# Patient Record
Sex: MALE | Race: WHITE | Employment: OTHER | URBAN - METROPOLITAN AREA
[De-identification: names, ages, dates, MRNs, and addresses within clinical notes are randomized per-mention and may not be internally consistent; named-entity substitution may affect disease eponyms.]

---

## 2020-09-26 ENCOUNTER — HOSPITAL ENCOUNTER (OUTPATIENT)
Age: 60
Discharge: HOME | End: 2020-09-26
Payer: MEDICARE

## 2020-09-26 LAB
ABSOLUTE EOS #: 0.37 K/UL (ref 0–0.44)
ABSOLUTE IMMATURE GRANULOCYTE: 0.03 K/UL (ref 0–0.3)
ABSOLUTE LYMPH #: 1.54 K/UL (ref 1.1–3.7)
ABSOLUTE MONO #: 0.45 K/UL (ref 0.1–1.2)
ALBUMIN SERPL-MCNC: 3.6 G/DL (ref 3.5–5.2)
ALBUMIN/GLOBULIN RATIO: 1.3 (ref 1–2.5)
ALP BLD-CCNC: 38 U/L (ref 40–129)
ALT SERPL-CCNC: 9 U/L (ref 5–41)
ANION GAP SERPL CALCULATED.3IONS-SCNC: 9 MMOL/L (ref 9–17)
AST SERPL-CCNC: 15 U/L
BASOPHILS # BLD: 2 % (ref 0–2)
BASOPHILS ABSOLUTE: 0.12 K/UL (ref 0–0.2)
BILIRUB SERPL-MCNC: 0.59 MG/DL (ref 0.3–1.2)
BNP INTERPRETATION: ABNORMAL
BUN BLDV-MCNC: 12 MG/DL (ref 8–23)
BUN/CREAT BLD: ABNORMAL (ref 9–20)
CALCIUM SERPL-MCNC: 8.9 MG/DL (ref 8.6–10.4)
CHLORIDE BLD-SCNC: 100 MMOL/L (ref 98–107)
CHOLESTEROL/HDL RATIO: 5
CHOLESTEROL: 136 MG/DL
CO2: 32 MMOL/L (ref 20–31)
CREAT SERPL-MCNC: 1.15 MG/DL (ref 0.7–1.2)
DIFFERENTIAL TYPE: ABNORMAL
EOSINOPHILS RELATIVE PERCENT: 7 % (ref 1–4)
GFR AFRICAN AMERICAN: >60 ML/MIN
GFR NON-AFRICAN AMERICAN: >60 ML/MIN
GFR SERPL CREATININE-BSD FRML MDRD: ABNORMAL ML/MIN/{1.73_M2}
GFR SERPL CREATININE-BSD FRML MDRD: ABNORMAL ML/MIN/{1.73_M2}
GLUCOSE BLD-MCNC: 94 MG/DL (ref 70–99)
HCT VFR BLD CALC: 41.1 % (ref 40.7–50.3)
HDLC SERPL-MCNC: 27 MG/DL
HEMOGLOBIN: 12.9 G/DL (ref 13–17)
IMMATURE GRANULOCYTES: 1 %
LDL CHOLESTEROL: 72 MG/DL (ref 0–130)
LYMPHOCYTES # BLD: 30 % (ref 24–43)
MCH RBC QN AUTO: 27.4 PG (ref 25.2–33.5)
MCHC RBC AUTO-ENTMCNC: 31.4 G/DL (ref 28.4–34.8)
MCV RBC AUTO: 87.3 FL (ref 82.6–102.9)
MONOCYTES # BLD: 9 % (ref 3–12)
NRBC AUTOMATED: 0 PER 100 WBC
PDW BLD-RTO: 13.2 % (ref 11.8–14.4)
PLATELET # BLD: 246 K/UL (ref 138–453)
PLATELET ESTIMATE: ABNORMAL
PMV BLD AUTO: 8.7 FL (ref 8.1–13.5)
POTASSIUM SERPL-SCNC: 3.7 MMOL/L (ref 3.7–5.3)
PRO-BNP: 376 PG/ML
PROSTATE SPECIFIC ANTIGEN: 0.18 UG/L
RBC # BLD: 4.71 M/UL (ref 4.21–5.77)
RBC # BLD: ABNORMAL 10*6/UL
SEG NEUTROPHILS: 51 % (ref 36–65)
SEGMENTED NEUTROPHILS ABSOLUTE COUNT: 2.62 K/UL (ref 1.5–8.1)
SODIUM BLD-SCNC: 141 MMOL/L (ref 135–144)
THYROXINE, FREE: 1.04 NG/DL (ref 0.93–1.7)
TOTAL CK: 74 U/L (ref 39–308)
TOTAL PROTEIN: 6.3 G/DL (ref 6.4–8.3)
TRIGL SERPL-MCNC: 186 MG/DL
TSH SERPL DL<=0.05 MIU/L-ACNC: 1.31 MIU/L (ref 0.3–5)
VLDLC SERPL CALC-MCNC: ABNORMAL MG/DL (ref 1–30)
WBC # BLD: 5.1 K/UL (ref 3.5–11.3)
WBC # BLD: ABNORMAL 10*3/UL

## 2020-09-26 PROCEDURE — 83036 HEMOGLOBIN GLYCOSYLATED A1C: CPT

## 2020-09-26 PROCEDURE — 85025 COMPLETE CBC W/AUTO DIFF WBC: CPT

## 2020-09-26 PROCEDURE — 36415 COLL VENOUS BLD VENIPUNCTURE: CPT

## 2020-09-26 PROCEDURE — 80061 LIPID PANEL: CPT

## 2020-09-26 PROCEDURE — 83880 ASSAY OF NATRIURETIC PEPTIDE: CPT

## 2020-09-26 PROCEDURE — 84443 ASSAY THYROID STIM HORMONE: CPT

## 2020-09-26 PROCEDURE — 84439 ASSAY OF FREE THYROXINE: CPT

## 2020-09-26 PROCEDURE — 82550 ASSAY OF CK (CPK): CPT

## 2020-09-26 PROCEDURE — 84153 ASSAY OF PSA TOTAL: CPT

## 2020-09-26 PROCEDURE — 80053 COMPREHEN METABOLIC PANEL: CPT

## 2020-09-29 LAB
ESTIMATED AVERAGE GLUCOSE: 117 MG/DL
HBA1C MFR BLD: 5.7 % (ref 4–6)

## 2020-10-01 ENCOUNTER — HOSPITAL ENCOUNTER (OUTPATIENT)
Age: 60
Setting detail: SPECIMEN
Discharge: HOME | End: 2020-10-01
Payer: MEDICARE

## 2020-10-01 LAB
-: ABNORMAL
AMORPHOUS: ABNORMAL
BACTERIA: ABNORMAL
BILIRUBIN URINE: NEGATIVE
CASTS UA: ABNORMAL /LPF (ref 0–8)
COLOR: YELLOW
CRYSTALS, UA: ABNORMAL /HPF
EPITHELIAL CELLS UA: ABNORMAL /HPF (ref 0–5)
GLUCOSE URINE: NEGATIVE
KETONES, URINE: NEGATIVE
LEUKOCYTE ESTERASE, URINE: NEGATIVE
MUCUS: ABNORMAL
NITRITE, URINE: NEGATIVE
OTHER OBSERVATIONS UA: ABNORMAL
PH UA: 7 (ref 5–8)
PROTEIN UA: NEGATIVE
RBC UA: ABNORMAL /HPF (ref 0–4)
RENAL EPITHELIAL, UA: ABNORMAL /HPF
SPECIFIC GRAVITY UA: 1.02 (ref 1–1.03)
TRICHOMONAS: ABNORMAL
TURBIDITY: CLEAR
URINE HGB: NEGATIVE
UROBILINOGEN, URINE: ABNORMAL
WBC UA: ABNORMAL /HPF (ref 0–5)
YEAST: ABNORMAL

## 2020-10-01 PROCEDURE — 81001 URINALYSIS AUTO W/SCOPE: CPT

## 2022-01-01 ENCOUNTER — HOSPITAL ENCOUNTER (INPATIENT)
Age: 62
LOS: 2 days | DRG: 951 | End: 2022-01-30
Attending: INTERNAL MEDICINE | Admitting: INTERNAL MEDICINE
Payer: COMMERCIAL

## 2022-01-01 ENCOUNTER — APPOINTMENT (OUTPATIENT)
Dept: GENERAL RADIOLOGY | Age: 62
DRG: 177 | End: 2022-01-01
Payer: MEDICARE

## 2022-01-01 ENCOUNTER — APPOINTMENT (OUTPATIENT)
Dept: ULTRASOUND IMAGING | Age: 62
DRG: 177 | End: 2022-01-01
Payer: MEDICARE

## 2022-01-01 ENCOUNTER — APPOINTMENT (OUTPATIENT)
Dept: CT IMAGING | Age: 62
DRG: 177 | End: 2022-01-01
Payer: MEDICARE

## 2022-01-01 ENCOUNTER — HOSPITAL ENCOUNTER (INPATIENT)
Age: 62
LOS: 20 days | Discharge: HOSPICE | DRG: 177 | End: 2022-01-28
Attending: EMERGENCY MEDICINE | Admitting: INTERNAL MEDICINE
Payer: MEDICARE

## 2022-01-01 VITALS
RESPIRATION RATE: 30 BRPM | OXYGEN SATURATION: 62 % | TEMPERATURE: 99.7 F | BODY MASS INDEX: 34.62 KG/M2 | WEIGHT: 248.2 LBS | HEART RATE: 110 BPM | DIASTOLIC BLOOD PRESSURE: 65 MMHG | SYSTOLIC BLOOD PRESSURE: 93 MMHG

## 2022-01-01 VITALS
OXYGEN SATURATION: 86 % | RESPIRATION RATE: 18 BRPM | HEIGHT: 71 IN | DIASTOLIC BLOOD PRESSURE: 64 MMHG | SYSTOLIC BLOOD PRESSURE: 115 MMHG | WEIGHT: 248.06 LBS | HEART RATE: 110 BPM | BODY MASS INDEX: 34.73 KG/M2 | TEMPERATURE: 98.8 F

## 2022-01-01 DIAGNOSIS — J96.02 ACUTE RESPIRATORY FAILURE WITH HYPERCAPNIA (HCC): ICD-10-CM

## 2022-01-01 DIAGNOSIS — U07.1 COVID-19: ICD-10-CM

## 2022-01-01 DIAGNOSIS — R41.82 ALTERED MENTAL STATUS, UNSPECIFIED ALTERED MENTAL STATUS TYPE: Primary | ICD-10-CM

## 2022-01-01 LAB
-: NORMAL
ABSOLUTE EOS #: 0 K/UL (ref 0–0.4)
ABSOLUTE EOS #: 0 K/UL (ref 0–0.44)
ABSOLUTE EOS #: 0 K/UL (ref 0–0.44)
ABSOLUTE EOS #: 0.1 K/UL (ref 0–0.44)
ABSOLUTE EOS #: 0.19 K/UL (ref 0–0.44)
ABSOLUTE EOS #: 0.2 K/UL (ref 0–0.44)
ABSOLUTE EOS #: 0.21 K/UL (ref 0–0.44)
ABSOLUTE IMMATURE GRANULOCYTE: 0 K/UL (ref 0–0.3)
ABSOLUTE IMMATURE GRANULOCYTE: 0.04 K/UL (ref 0–0.3)
ABSOLUTE IMMATURE GRANULOCYTE: 0.06 K/UL (ref 0–0.3)
ABSOLUTE IMMATURE GRANULOCYTE: 0.07 K/UL (ref 0–0.3)
ABSOLUTE IMMATURE GRANULOCYTE: 0.07 K/UL (ref 0–0.3)
ABSOLUTE IMMATURE GRANULOCYTE: 0.1 K/UL (ref 0–0.3)
ABSOLUTE IMMATURE GRANULOCYTE: 0.11 K/UL (ref 0–0.3)
ABSOLUTE LYMPH #: 0.29 K/UL (ref 1.1–3.7)
ABSOLUTE LYMPH #: 0.43 K/UL (ref 1.1–3.7)
ABSOLUTE LYMPH #: 0.59 K/UL (ref 1–4.8)
ABSOLUTE LYMPH #: 0.92 K/UL (ref 1.1–3.7)
ABSOLUTE LYMPH #: 1.03 K/UL (ref 1.1–3.7)
ABSOLUTE LYMPH #: 1.25 K/UL (ref 1.1–3.7)
ABSOLUTE LYMPH #: 1.32 K/UL (ref 1.1–3.7)
ABSOLUTE MONO #: 0.08 K/UL (ref 0.1–1.2)
ABSOLUTE MONO #: 0.5 K/UL (ref 0.1–1.2)
ABSOLUTE MONO #: 0.59 K/UL (ref 0.2–0.8)
ABSOLUTE MONO #: 0.71 K/UL (ref 0.1–1.2)
ABSOLUTE MONO #: 0.83 K/UL (ref 0.1–1.2)
ABSOLUTE MONO #: 0.86 K/UL (ref 0.1–1.2)
ABSOLUTE MONO #: 0.94 K/UL (ref 0.1–1.2)
ACETAMINOPHEN LEVEL: <10 UG/ML (ref 10–30)
ACTION: NORMAL
ALBUMIN (CALCULATED): 3.9 G/DL (ref 3.2–5.2)
ALBUMIN PERCENT: 61 % (ref 45–65)
ALBUMIN SERPL-MCNC: 3.4 G/DL (ref 3.5–5.2)
ALBUMIN SERPL-MCNC: 3.5 G/DL (ref 3.5–5.2)
ALBUMIN SERPL-MCNC: 3.8 G/DL (ref 3.5–5.2)
ALBUMIN/GLOBULIN RATIO: ABNORMAL (ref 1–2.5)
ALLEN TEST: ABNORMAL
ALLEN TEST: POSITIVE
ALP BLD-CCNC: 57 U/L (ref 40–129)
ALP BLD-CCNC: 62 U/L (ref 40–129)
ALP BLD-CCNC: 71 U/L (ref 40–129)
ALP BLD-CCNC: 76 U/L (ref 40–129)
ALP BLD-CCNC: 77 U/L (ref 40–129)
ALPHA 1 PERCENT: 4 % (ref 3–6)
ALPHA 2 PERCENT: 12 % (ref 6–13)
ALPHA-1-GLOBULIN: 0.2 G/DL (ref 0.1–0.4)
ALPHA-2-GLOBULIN: 0.7 G/DL (ref 0.5–0.9)
ALT SERPL-CCNC: 421 U/L (ref 5–41)
ALT SERPL-CCNC: 46 U/L (ref 5–41)
ALT SERPL-CCNC: 46 U/L (ref 5–41)
ALT SERPL-CCNC: 501 U/L (ref 5–41)
ALT SERPL-CCNC: 559 U/L (ref 5–41)
AMMONIA: 20 UMOL/L (ref 16–60)
AMORPHOUS: NORMAL
ANION GAP SERPL CALCULATED.3IONS-SCNC: 10 MMOL/L (ref 9–17)
ANION GAP SERPL CALCULATED.3IONS-SCNC: 12 MMOL/L (ref 9–17)
ANION GAP SERPL CALCULATED.3IONS-SCNC: 13 MMOL/L (ref 9–17)
ANION GAP SERPL CALCULATED.3IONS-SCNC: 13 MMOL/L (ref 9–17)
ANION GAP SERPL CALCULATED.3IONS-SCNC: 14 MMOL/L (ref 9–17)
ANION GAP SERPL CALCULATED.3IONS-SCNC: 15 MMOL/L (ref 9–17)
ANTI DNA DOUBLE STRANDED: 25 IU/ML
ANTI JO-1 IGG: 1.1 U/ML
ANTI SSA: 1.1 U/ML
ANTI SSB: 1.3 U/ML
ANTI-CENTROMERE: 0.9 U/ML
ANTI-NUCLEAR ANTIBODY (ANA): POSITIVE
ANTI-RNP70: 0.9 U/ML
ANTI-SCLERODERMA: <0.6 U/ML
ANTI-SMITH: 4.5 U/ML
ANTI-U1RNP: 4.1 U/ML
AST SERPL-CCNC: 134 U/L
AST SERPL-CCNC: 230 U/L
AST SERPL-CCNC: 399 U/L
AST SERPL-CCNC: 60 U/L
AST SERPL-CCNC: 82 U/L
BACTERIA: NORMAL
BASOPHILS # BLD: 0 %
BASOPHILS # BLD: 0 % (ref 0–2)
BASOPHILS # BLD: 1 % (ref 0–2)
BASOPHILS ABSOLUTE: 0 K/UL (ref 0–0.2)
BASOPHILS ABSOLUTE: 0.03 K/UL (ref 0–0.2)
BASOPHILS ABSOLUTE: 0.04 K/UL (ref 0–0.2)
BASOPHILS ABSOLUTE: 0.06 K/UL (ref 0–0.2)
BASOPHILS ABSOLUTE: 0.09 K/UL (ref 0–0.2)
BETA GLOBULIN: 0.8 G/DL (ref 0.5–1.1)
BETA PERCENT: 12 % (ref 11–19)
BILIRUB SERPL-MCNC: 0.36 MG/DL (ref 0.3–1.2)
BILIRUB SERPL-MCNC: 0.45 MG/DL (ref 0.3–1.2)
BILIRUB SERPL-MCNC: 1.07 MG/DL (ref 0.3–1.2)
BILIRUB SERPL-MCNC: 1.43 MG/DL (ref 0.3–1.2)
BILIRUB SERPL-MCNC: 1.54 MG/DL (ref 0.3–1.2)
BILIRUBIN DIRECT: 0.21 MG/DL
BILIRUBIN DIRECT: 0.51 MG/DL
BILIRUBIN DIRECT: 0.92 MG/DL
BILIRUBIN URINE: NEGATIVE
BILIRUBIN, INDIRECT: 0.24 MG/DL (ref 0–1)
BILIRUBIN, INDIRECT: 0.56 MG/DL (ref 0–1)
BILIRUBIN, INDIRECT: 0.62 MG/DL (ref 0–1)
BNP INTERPRETATION: ABNORMAL
BNP INTERPRETATION: ABNORMAL
BUN BLDV-MCNC: 23 MG/DL (ref 8–23)
BUN BLDV-MCNC: 24 MG/DL (ref 8–23)
BUN BLDV-MCNC: 26 MG/DL (ref 8–23)
BUN BLDV-MCNC: 27 MG/DL (ref 8–23)
BUN BLDV-MCNC: 28 MG/DL (ref 8–23)
BUN BLDV-MCNC: 31 MG/DL (ref 8–23)
BUN BLDV-MCNC: 32 MG/DL (ref 8–23)
BUN BLDV-MCNC: 35 MG/DL (ref 8–23)
BUN BLDV-MCNC: 35 MG/DL (ref 8–23)
BUN BLDV-MCNC: 36 MG/DL (ref 8–23)
BUN BLDV-MCNC: 38 MG/DL (ref 8–23)
BUN BLDV-MCNC: 38 MG/DL (ref 8–23)
BUN BLDV-MCNC: 39 MG/DL (ref 8–23)
BUN BLDV-MCNC: 40 MG/DL (ref 8–23)
BUN BLDV-MCNC: 40 MG/DL (ref 8–23)
BUN BLDV-MCNC: 42 MG/DL (ref 8–23)
BUN/CREAT BLD: 10 (ref 9–20)
BUN/CREAT BLD: 20 (ref 9–20)
BUN/CREAT BLD: 23 (ref 9–20)
BUN/CREAT BLD: 24 (ref 9–20)
BUN/CREAT BLD: 24 (ref 9–20)
BUN/CREAT BLD: 25 (ref 9–20)
BUN/CREAT BLD: 25 (ref 9–20)
BUN/CREAT BLD: 26 (ref 9–20)
BUN/CREAT BLD: 28 (ref 9–20)
BUN/CREAT BLD: 29 (ref 9–20)
BUN/CREAT BLD: 33 (ref 9–20)
BUN/CREAT BLD: 34 (ref 9–20)
BUN/CREAT BLD: 39 (ref 9–20)
BUN/CREAT BLD: 7 (ref 9–20)
C DIFF AG + TOXIN: NEGATIVE
C-REACTIVE PROTEIN: 100.7 MG/L (ref 0–5)
C-REACTIVE PROTEIN: 216.4 MG/L (ref 0–5)
C-REACTIVE PROTEIN: 9.3 MG/L (ref 0–5)
CALCIUM SERPL-MCNC: 8.3 MG/DL (ref 8.6–10.4)
CALCIUM SERPL-MCNC: 8.4 MG/DL (ref 8.6–10.4)
CALCIUM SERPL-MCNC: 8.5 MG/DL (ref 8.6–10.4)
CALCIUM SERPL-MCNC: 8.6 MG/DL (ref 8.6–10.4)
CALCIUM SERPL-MCNC: 8.6 MG/DL (ref 8.6–10.4)
CALCIUM SERPL-MCNC: 8.7 MG/DL (ref 8.6–10.4)
CALCIUM SERPL-MCNC: 8.8 MG/DL (ref 8.6–10.4)
CALCIUM SERPL-MCNC: 8.8 MG/DL (ref 8.6–10.4)
CALCIUM SERPL-MCNC: 8.9 MG/DL (ref 8.6–10.4)
CALCIUM SERPL-MCNC: 9 MG/DL (ref 8.6–10.4)
CALCIUM SERPL-MCNC: 9.3 MG/DL (ref 8.6–10.4)
CALCIUM SERPL-MCNC: 9.4 MG/DL (ref 8.6–10.4)
CAMPYLOBACTER PCR: NORMAL
CASTS UA: NORMAL /LPF
CHLORIDE BLD-SCNC: 100 MMOL/L (ref 98–107)
CHLORIDE BLD-SCNC: 102 MMOL/L (ref 98–107)
CHLORIDE BLD-SCNC: 102 MMOL/L (ref 98–107)
CHLORIDE BLD-SCNC: 103 MMOL/L (ref 98–107)
CHLORIDE BLD-SCNC: 89 MMOL/L (ref 98–107)
CHLORIDE BLD-SCNC: 93 MMOL/L (ref 98–107)
CHLORIDE BLD-SCNC: 93 MMOL/L (ref 98–107)
CHLORIDE BLD-SCNC: 94 MMOL/L (ref 98–107)
CHLORIDE BLD-SCNC: 94 MMOL/L (ref 98–107)
CHLORIDE BLD-SCNC: 96 MMOL/L (ref 98–107)
CHLORIDE BLD-SCNC: 97 MMOL/L (ref 98–107)
CHLORIDE BLD-SCNC: 97 MMOL/L (ref 98–107)
CHLORIDE BLD-SCNC: 98 MMOL/L (ref 98–107)
CHLORIDE BLD-SCNC: 98 MMOL/L (ref 98–107)
CHLORIDE BLD-SCNC: 99 MMOL/L (ref 98–107)
CO2: 22 MMOL/L (ref 20–31)
CO2: 23 MMOL/L (ref 20–31)
CO2: 24 MMOL/L (ref 20–31)
CO2: 25 MMOL/L (ref 20–31)
CO2: 25 MMOL/L (ref 20–31)
CO2: 26 MMOL/L (ref 20–31)
CO2: 27 MMOL/L (ref 20–31)
CO2: 27 MMOL/L (ref 20–31)
CO2: 28 MMOL/L (ref 20–31)
CO2: 28 MMOL/L (ref 20–31)
CO2: 29 MMOL/L (ref 20–31)
CO2: 30 MMOL/L (ref 20–31)
CO2: 34 MMOL/L (ref 20–31)
CO2: 37 MMOL/L (ref 20–31)
COLOR: YELLOW
COMMENT UA: ABNORMAL
COMPLEMENT C3: 139 MG/DL (ref 90–180)
COMPLEMENT C4: 34 MG/DL (ref 10–40)
CREAT SERPL-MCNC: 0.93 MG/DL (ref 0.7–1.2)
CREAT SERPL-MCNC: 0.93 MG/DL (ref 0.7–1.2)
CREAT SERPL-MCNC: 0.98 MG/DL (ref 0.7–1.2)
CREAT SERPL-MCNC: 0.99 MG/DL (ref 0.7–1.2)
CREAT SERPL-MCNC: 1.12 MG/DL (ref 0.7–1.2)
CREAT SERPL-MCNC: 1.13 MG/DL (ref 0.7–1.2)
CREAT SERPL-MCNC: 1.14 MG/DL (ref 0.7–1.2)
CREAT SERPL-MCNC: 1.19 MG/DL (ref 0.7–1.2)
CREAT SERPL-MCNC: 1.2 MG/DL (ref 0.7–1.2)
CREAT SERPL-MCNC: 1.23 MG/DL (ref 0.7–1.2)
CREAT SERPL-MCNC: 1.24 MG/DL (ref 0.7–1.2)
CREAT SERPL-MCNC: 1.25 MG/DL (ref 0.7–1.2)
CREAT SERPL-MCNC: 1.33 MG/DL (ref 0.7–1.2)
CREAT SERPL-MCNC: 1.45 MG/DL (ref 0.7–1.2)
CREAT SERPL-MCNC: 1.49 MG/DL (ref 0.7–1.2)
CREAT SERPL-MCNC: 1.66 MG/DL (ref 0.7–1.2)
CREAT SERPL-MCNC: 2.31 MG/DL (ref 0.7–1.2)
CREAT SERPL-MCNC: 3.09 MG/DL (ref 0.7–1.2)
CRYSTALS, UA: NORMAL /HPF
CULTURE: NORMAL
D-DIMER QUANTITATIVE: 0.69 MG/L FEU (ref 0–0.59)
D-DIMER QUANTITATIVE: 1.02 MG/L FEU (ref 0–0.59)
D-DIMER QUANTITATIVE: 1.59 MG/L FEU (ref 0–0.59)
D-DIMER QUANTITATIVE: 2.31 MG/L FEU (ref 0–0.59)
D-DIMER QUANTITATIVE: 2.32 MG/L FEU (ref 0–0.59)
DATE AND TIME: NORMAL
DIFFERENTIAL TYPE: ABNORMAL
E COLI ENTEROTOXIGENIC PCR: NORMAL
EKG ATRIAL RATE: 85 BPM
EKG ATRIAL RATE: 86 BPM
EKG ATRIAL RATE: 91 BPM
EKG ATRIAL RATE: 94 BPM
EKG P AXIS: 27 DEGREES
EKG P AXIS: 42 DEGREES
EKG P AXIS: 57 DEGREES
EKG P-R INTERVAL: 116 MS
EKG P-R INTERVAL: 118 MS
EKG P-R INTERVAL: 142 MS
EKG P-R INTERVAL: 150 MS
EKG Q-T INTERVAL: 374 MS
EKG Q-T INTERVAL: 380 MS
EKG Q-T INTERVAL: 380 MS
EKG Q-T INTERVAL: 384 MS
EKG QRS DURATION: 100 MS
EKG QRS DURATION: 100 MS
EKG QRS DURATION: 92 MS
EKG QRS DURATION: 96 MS
EKG QTC CALCULATION (BAZETT): 454 MS
EKG QTC CALCULATION (BAZETT): 456 MS
EKG QTC CALCULATION (BAZETT): 460 MS
EKG QTC CALCULATION (BAZETT): 475 MS
EKG R AXIS: -11 DEGREES
EKG R AXIS: -5 DEGREES
EKG R AXIS: 11 DEGREES
EKG R AXIS: 43 DEGREES
EKG T AXIS: 18 DEGREES
EKG T AXIS: 20 DEGREES
EKG T AXIS: 27 DEGREES
EKG T AXIS: 38 DEGREES
EKG VENTRICULAR RATE: 85 BPM
EKG VENTRICULAR RATE: 86 BPM
EKG VENTRICULAR RATE: 91 BPM
EKG VENTRICULAR RATE: 94 BPM
ENA ANTIBODIES SCREEN: 0.6 U/ML
EOSINOPHILS RELATIVE PERCENT: 0 % (ref 1–4)
EOSINOPHILS RELATIVE PERCENT: 1 % (ref 1–4)
EOSINOPHILS RELATIVE PERCENT: 2 % (ref 1–4)
EPITHELIAL CELLS UA: NORMAL /HPF (ref 0–5)
ETHANOL PERCENT: <0.01 %
ETHANOL: <10 MG/DL
FERRITIN: 884 UG/L (ref 30–400)
FIBRINOGEN: 481 MG/DL (ref 179–518)
FIO2: 100
FIO2: 2
FIO2: ABNORMAL
FIO2: ABNORMAL
FREE KAPPA/LAMBDA RATIO: 1.67 (ref 0.26–1.65)
GAMMA GLOBULIN %: 13 % (ref 9–20)
GAMMA GLOBULIN: 0.8 G/DL (ref 0.5–1.5)
GFR AFRICAN AMERICAN: 25 ML/MIN
GFR AFRICAN AMERICAN: 35 ML/MIN
GFR AFRICAN AMERICAN: 51 ML/MIN
GFR AFRICAN AMERICAN: 58 ML/MIN
GFR AFRICAN AMERICAN: 60 ML/MIN
GFR AFRICAN AMERICAN: >60 ML/MIN
GFR NON-AFRICAN AMERICAN: 21 ML/MIN
GFR NON-AFRICAN AMERICAN: 29 ML/MIN
GFR NON-AFRICAN AMERICAN: 42 ML/MIN
GFR NON-AFRICAN AMERICAN: 48 ML/MIN
GFR NON-AFRICAN AMERICAN: 49 ML/MIN
GFR NON-AFRICAN AMERICAN: 54 ML/MIN
GFR NON-AFRICAN AMERICAN: 58 ML/MIN
GFR NON-AFRICAN AMERICAN: 59 ML/MIN
GFR NON-AFRICAN AMERICAN: 59 ML/MIN
GFR NON-AFRICAN AMERICAN: >60 ML/MIN
GFR SERPL CREATININE-BSD FRML MDRD: ABNORMAL ML/MIN/{1.73_M2}
GLOBULIN: ABNORMAL G/DL (ref 1.5–3.8)
GLUCOSE BLD-MCNC: 100 MG/DL (ref 70–99)
GLUCOSE BLD-MCNC: 105 MG/DL (ref 70–99)
GLUCOSE BLD-MCNC: 112 MG/DL (ref 70–99)
GLUCOSE BLD-MCNC: 115 MG/DL (ref 70–99)
GLUCOSE BLD-MCNC: 117 MG/DL (ref 70–99)
GLUCOSE BLD-MCNC: 124 MG/DL (ref 70–99)
GLUCOSE BLD-MCNC: 125 MG/DL (ref 70–99)
GLUCOSE BLD-MCNC: 133 MG/DL (ref 70–99)
GLUCOSE BLD-MCNC: 137 MG/DL (ref 70–99)
GLUCOSE BLD-MCNC: 137 MG/DL (ref 70–99)
GLUCOSE BLD-MCNC: 140 MG/DL (ref 70–99)
GLUCOSE BLD-MCNC: 146 MG/DL (ref 70–99)
GLUCOSE BLD-MCNC: 149 MG/DL (ref 70–99)
GLUCOSE BLD-MCNC: 152 MG/DL (ref 70–99)
GLUCOSE BLD-MCNC: 161 MG/DL (ref 70–99)
GLUCOSE BLD-MCNC: 92 MG/DL (ref 70–99)
GLUCOSE URINE: NEGATIVE
HAV IGM SER IA-ACNC: NONREACTIVE
HCO3 VENOUS: 27.1 MMOL/L (ref 22–29)
HCT VFR BLD CALC: 37.6 % (ref 40.7–50.3)
HCT VFR BLD CALC: 37.6 % (ref 40.7–50.3)
HCT VFR BLD CALC: 40.4 % (ref 40.7–50.3)
HCT VFR BLD CALC: 40.8 % (ref 40.7–50.3)
HCT VFR BLD CALC: 41.1 % (ref 40.7–50.3)
HCT VFR BLD CALC: 41.3 % (ref 40.7–50.3)
HCT VFR BLD CALC: 41.5 % (ref 40.7–50.3)
HCT VFR BLD CALC: 41.7 % (ref 40.7–50.3)
HCT VFR BLD CALC: 41.7 % (ref 40.7–50.3)
HCT VFR BLD CALC: 45.3 % (ref 40.7–50.3)
HCT VFR BLD CALC: 46.7 % (ref 40.7–50.3)
HEMOGLOBIN: 11.5 G/DL (ref 13–17)
HEMOGLOBIN: 11.9 G/DL (ref 13–17)
HEMOGLOBIN: 12.3 G/DL (ref 13–17)
HEMOGLOBIN: 12.6 G/DL (ref 13–17)
HEMOGLOBIN: 12.7 G/DL (ref 13–17)
HEMOGLOBIN: 12.9 G/DL (ref 13–17)
HEMOGLOBIN: 13.1 G/DL (ref 13–17)
HEMOGLOBIN: 13.2 G/DL (ref 13–17)
HEMOGLOBIN: 13.4 G/DL (ref 13–17)
HEMOGLOBIN: 13.8 G/DL (ref 13–17)
HEMOGLOBIN: 15.1 G/DL (ref 13–17)
HEPATITIS B CORE IGM ANTIBODY: NONREACTIVE
HEPATITIS B SURFACE ANTIGEN: NONREACTIVE
HEPATITIS C ANTIBODY: NONREACTIVE
IMMATURE GRANULOCYTES: 0 %
IMMATURE GRANULOCYTES: 1 %
INR BLD: 1.2
INR BLD: 1.3
KAPPA FREE LIGHT CHAINS QNT: 5.61 MG/DL (ref 0.37–1.94)
KETONES, URINE: NEGATIVE
LACTATE DEHYDROGENASE: 366 U/L (ref 135–225)
LACTIC ACID, SEPSIS WHOLE BLOOD: NORMAL MMOL/L (ref 0.5–1.9)
LACTIC ACID, SEPSIS WHOLE BLOOD: NORMAL MMOL/L (ref 0.5–1.9)
LACTIC ACID, SEPSIS: 1.5 MMOL/L (ref 0.5–1.9)
LACTIC ACID, SEPSIS: 1.6 MMOL/L (ref 0.5–1.9)
LACTIC ACID, WHOLE BLOOD: NORMAL MMOL/L (ref 0.7–2.1)
LACTIC ACID: 1 MMOL/L (ref 0.5–2.2)
LACTIC ACID: 1.4 MMOL/L (ref 0.5–2.2)
LACTIC ACID: 1.7 MMOL/L (ref 0.5–2.2)
LAMBDA FREE LIGHT CHAINS QNT: 3.36 MG/DL (ref 0.57–2.63)
LEUKOCYTE ESTERASE, URINE: NEGATIVE
LV EF: 55 %
LVEF MODALITY: NORMAL
LYMPHOCYTES # BLD: 10 % (ref 24–43)
LYMPHOCYTES # BLD: 11 % (ref 24–43)
LYMPHOCYTES # BLD: 12 % (ref 24–43)
LYMPHOCYTES # BLD: 12 % (ref 24–44)
LYMPHOCYTES # BLD: 13 % (ref 24–43)
LYMPHOCYTES # BLD: 7 % (ref 24–43)
LYMPHOCYTES # BLD: 7 % (ref 24–43)
Lab: NORMAL
MAGNESIUM: 1.9 MG/DL (ref 1.6–2.6)
MAGNESIUM: 2 MG/DL (ref 1.6–2.6)
MAGNESIUM: 2 MG/DL (ref 1.6–2.6)
MAGNESIUM: 2.1 MG/DL (ref 1.6–2.6)
MAGNESIUM: 2.2 MG/DL (ref 1.6–2.6)
MAGNESIUM: 2.2 MG/DL (ref 1.6–2.6)
MAGNESIUM: 2.4 MG/DL (ref 1.6–2.6)
MAGNESIUM: 2.4 MG/DL (ref 1.6–2.6)
MCH RBC QN AUTO: 26.2 PG (ref 25.2–33.5)
MCH RBC QN AUTO: 26.6 PG (ref 25.2–33.5)
MCH RBC QN AUTO: 26.7 PG (ref 25.2–33.5)
MCH RBC QN AUTO: 26.8 PG (ref 25.2–33.5)
MCH RBC QN AUTO: 26.9 PG (ref 25.2–33.5)
MCH RBC QN AUTO: 27 PG (ref 25.2–33.5)
MCH RBC QN AUTO: 27 PG (ref 25.2–33.5)
MCH RBC QN AUTO: 27.1 PG (ref 25.2–33.5)
MCH RBC QN AUTO: 27.1 PG (ref 25.2–33.5)
MCHC RBC AUTO-ENTMCNC: 29.5 G/DL (ref 28.4–34.8)
MCHC RBC AUTO-ENTMCNC: 30.5 G/DL (ref 28.4–34.8)
MCHC RBC AUTO-ENTMCNC: 30.5 G/DL (ref 28.4–34.8)
MCHC RBC AUTO-ENTMCNC: 30.6 G/DL (ref 28.4–34.8)
MCHC RBC AUTO-ENTMCNC: 31.1 G/DL (ref 28.4–34.8)
MCHC RBC AUTO-ENTMCNC: 31.2 G/DL (ref 28.4–34.8)
MCHC RBC AUTO-ENTMCNC: 31.6 G/DL (ref 28.4–34.8)
MCHC RBC AUTO-ENTMCNC: 31.9 G/DL (ref 28.4–34.8)
MCHC RBC AUTO-ENTMCNC: 32.3 G/DL (ref 28.4–34.8)
MCHC RBC AUTO-ENTMCNC: 32.4 G/DL (ref 28.4–34.8)
MCHC RBC AUTO-ENTMCNC: 32.4 G/DL (ref 28.4–34.8)
MCV RBC AUTO: 81.1 FL (ref 82.6–102.9)
MCV RBC AUTO: 82.3 FL (ref 82.6–102.9)
MCV RBC AUTO: 83.3 FL (ref 82.6–102.9)
MCV RBC AUTO: 83.7 FL (ref 82.6–102.9)
MCV RBC AUTO: 84.7 FL (ref 82.6–102.9)
MCV RBC AUTO: 86.1 FL (ref 82.6–102.9)
MCV RBC AUTO: 87.2 FL (ref 82.6–102.9)
MCV RBC AUTO: 87.8 FL (ref 82.6–102.9)
MCV RBC AUTO: 88.6 FL (ref 82.6–102.9)
MCV RBC AUTO: 88.7 FL (ref 82.6–102.9)
MCV RBC AUTO: 90.7 FL (ref 82.6–102.9)
MODE: ABNORMAL
MONOCYTES # BLD: 10 % (ref 3–12)
MONOCYTES # BLD: 12 % (ref 1–7)
MONOCYTES # BLD: 2 % (ref 3–12)
MONOCYTES # BLD: 7 % (ref 3–12)
MONOCYTES # BLD: 8 % (ref 3–12)
MONOCYTES # BLD: 8 % (ref 3–12)
MONOCYTES # BLD: 9 % (ref 3–12)
MUCUS: NORMAL
MYOGLOBIN: 1115 NG/ML (ref 28–72)
MYOGLOBIN: 940 NG/ML (ref 28–72)
NEGATIVE BASE EXCESS, ART: ABNORMAL (ref 0–2)
NEGATIVE BASE EXCESS, VEN: 2 (ref 0–2)
NITRITE, URINE: NEGATIVE
NOTIFY: NORMAL
NRBC AUTOMATED: 0 PER 100 WBC
O2 DEVICE/FLOW/%: ABNORMAL
O2 SAT, VEN: 44 % (ref 60–85)
OTHER OBSERVATIONS UA: NORMAL
PARTIAL THROMBOPLASTIN TIME: 36.6 SEC (ref 23.9–33.8)
PARTIAL THROMBOPLASTIN TIME: 38.1 SEC (ref 23.9–33.8)
PATHOLOGIST: NORMAL
PATHOLOGIST: NORMAL
PATIENT TEMP: ABNORMAL
PCO2, VEN: 61.9 MM HG (ref 41–51)
PDW BLD-RTO: 13.9 % (ref 11.8–14.4)
PDW BLD-RTO: 14.1 % (ref 11.8–14.4)
PDW BLD-RTO: 14.1 % (ref 11.8–14.4)
PDW BLD-RTO: 14.2 % (ref 11.8–14.4)
PDW BLD-RTO: 14.3 % (ref 11.8–14.4)
PDW BLD-RTO: 14.7 % (ref 11.8–14.4)
PDW BLD-RTO: 15.2 % (ref 11.8–14.4)
PH UA: 5.5 (ref 5–8)
PH VENOUS: 7.25 (ref 7.32–7.43)
PLATELET # BLD: 143 K/UL (ref 138–453)
PLATELET # BLD: 157 K/UL (ref 138–453)
PLATELET # BLD: 176 K/UL (ref 138–453)
PLATELET # BLD: 259 K/UL (ref 138–453)
PLATELET # BLD: 279 K/UL (ref 138–453)
PLATELET # BLD: 285 K/UL (ref 138–453)
PLATELET # BLD: 300 K/UL (ref 138–453)
PLATELET # BLD: 309 K/UL (ref 138–453)
PLATELET # BLD: 316 K/UL (ref 138–453)
PLATELET # BLD: 326 K/UL (ref 138–453)
PLATELET # BLD: 356 K/UL (ref 138–453)
PLATELET ESTIMATE: ABNORMAL
PLESIOMONAS SHIGELLOIDES PCR: NORMAL
PMV BLD AUTO: 10.1 FL (ref 8.1–13.5)
PMV BLD AUTO: 10.3 FL (ref 8.1–13.5)
PMV BLD AUTO: 8.8 FL (ref 8.1–13.5)
PMV BLD AUTO: 8.9 FL (ref 8.1–13.5)
PMV BLD AUTO: 9.3 FL (ref 8.1–13.5)
PMV BLD AUTO: 9.3 FL (ref 8.1–13.5)
PMV BLD AUTO: 9.5 FL (ref 8.1–13.5)
PMV BLD AUTO: 9.6 FL (ref 8.1–13.5)
PMV BLD AUTO: 9.8 FL (ref 8.1–13.5)
PMV BLD AUTO: 9.8 FL (ref 8.1–13.5)
PMV BLD AUTO: 9.9 FL (ref 8.1–13.5)
PO2, VEN: 29.1 MM HG (ref 30–50)
POC HCO3: 24.8 MMOL/L (ref 21–28)
POC HCO3: 26.1 MMOL/L (ref 21–28)
POC HCO3: 29.2 MMOL/L (ref 21–28)
POC O2 SATURATION: 84 % (ref 94–98)
POC O2 SATURATION: 87 % (ref 94–98)
POC O2 SATURATION: 93 % (ref 94–98)
POC PCO2 TEMP: ABNORMAL MM HG
POC PCO2: 40.9 MM HG (ref 35–48)
POC PCO2: 42.2 MM HG (ref 35–48)
POC PCO2: 48.5 MM HG (ref 35–48)
POC PH TEMP: ABNORMAL
POC PH: 7.38 (ref 7.35–7.45)
POC PH: 7.39 (ref 7.35–7.45)
POC PH: 7.41 (ref 7.35–7.45)
POC PO2 TEMP: ABNORMAL MM HG
POC PO2: 50 MM HG (ref 83–108)
POC PO2: 52.8 MM HG (ref 83–108)
POC PO2: 69.7 MM HG (ref 83–108)
POSITIVE BASE EXCESS, ART: 0 (ref 0–3)
POSITIVE BASE EXCESS, ART: 1 (ref 0–3)
POSITIVE BASE EXCESS, ART: 3 (ref 0–3)
POSITIVE BASE EXCESS, VEN: ABNORMAL (ref 0–3)
POTASSIUM SERPL-SCNC: 3 MMOL/L (ref 3.7–5.3)
POTASSIUM SERPL-SCNC: 3.1 MMOL/L (ref 3.7–5.3)
POTASSIUM SERPL-SCNC: 3.3 MMOL/L (ref 3.7–5.3)
POTASSIUM SERPL-SCNC: 3.4 MMOL/L (ref 3.7–5.3)
POTASSIUM SERPL-SCNC: 3.5 MMOL/L (ref 3.7–5.3)
POTASSIUM SERPL-SCNC: 3.5 MMOL/L (ref 3.7–5.3)
POTASSIUM SERPL-SCNC: 3.7 MMOL/L (ref 3.7–5.3)
POTASSIUM SERPL-SCNC: 3.9 MMOL/L (ref 3.7–5.3)
POTASSIUM SERPL-SCNC: 4 MMOL/L (ref 3.7–5.3)
POTASSIUM SERPL-SCNC: 4 MMOL/L (ref 3.7–5.3)
POTASSIUM SERPL-SCNC: 4.1 MMOL/L (ref 3.7–5.3)
POTASSIUM SERPL-SCNC: 4.3 MMOL/L (ref 3.7–5.3)
POTASSIUM SERPL-SCNC: 4.5 MMOL/L (ref 3.7–5.3)
POTASSIUM SERPL-SCNC: 4.6 MMOL/L (ref 3.7–5.3)
PRO-BNP: 694 PG/ML
PRO-BNP: 894 PG/ML
PROCALCITONIN: 0.18 NG/ML
PROCALCITONIN: 0.4 NG/ML
PROCALCITONIN: 0.41 NG/ML
PROTEIN ELECTROPHORESIS, SERUM: NORMAL
PROTEIN UA: ABNORMAL
PROTHROMBIN TIME: 15.5 SEC (ref 11.5–14.2)
PROTHROMBIN TIME: 16.1 SEC (ref 11.5–14.2)
RBC # BLD: 4.24 M/UL (ref 4.21–5.77)
RBC # BLD: 4.44 M/UL (ref 4.21–5.77)
RBC # BLD: 4.6 M/UL (ref 4.21–5.77)
RBC # BLD: 4.69 M/UL (ref 4.21–5.77)
RBC # BLD: 4.75 M/UL (ref 4.21–5.77)
RBC # BLD: 4.76 M/UL (ref 4.21–5.77)
RBC # BLD: 4.91 M/UL (ref 4.21–5.77)
RBC # BLD: 4.96 M/UL (ref 4.21–5.77)
RBC # BLD: 4.96 M/UL (ref 4.21–5.77)
RBC # BLD: 5.11 M/UL (ref 4.21–5.77)
RBC # BLD: 5.76 M/UL (ref 4.21–5.77)
RBC # BLD: ABNORMAL 10*6/UL
RBC UA: NORMAL /HPF (ref 0–2)
READ BACK: YES
REASON FOR REJECTION: NORMAL
REASON FOR REJECTION: NORMAL
RENAL EPITHELIAL, UA: NORMAL /HPF
SALICYLATE LEVEL: <1 MG/DL (ref 3–10)
SALMONELLA PCR: NORMAL
SAMPLE SITE: ABNORMAL
SARS-COV-2, RAPID: DETECTED
SEG NEUTROPHILS: 73 % (ref 36–65)
SEG NEUTROPHILS: 76 % (ref 36–65)
SEG NEUTROPHILS: 76 % (ref 36–66)
SEG NEUTROPHILS: 78 % (ref 36–65)
SEG NEUTROPHILS: 79 % (ref 36–65)
SEG NEUTROPHILS: 83 % (ref 36–65)
SEG NEUTROPHILS: 89 % (ref 36–65)
SEGMENTED NEUTROPHILS ABSOLUTE COUNT: 3.72 K/UL (ref 1.8–7.7)
SEGMENTED NEUTROPHILS ABSOLUTE COUNT: 3.75 K/UL (ref 1.5–8.1)
SEGMENTED NEUTROPHILS ABSOLUTE COUNT: 5.15 K/UL (ref 1.5–8.1)
SEGMENTED NEUTROPHILS ABSOLUTE COUNT: 6.57 K/UL (ref 1.5–8.1)
SEGMENTED NEUTROPHILS ABSOLUTE COUNT: 7.17 K/UL (ref 1.5–8.1)
SEGMENTED NEUTROPHILS ABSOLUTE COUNT: 7.4 K/UL (ref 1.5–8.1)
SEGMENTED NEUTROPHILS ABSOLUTE COUNT: 9.43 K/UL (ref 1.5–8.1)
SERUM IFX INTERP: NORMAL
SHIGATOXIN GENE PCR: NORMAL
SHIGELLA SP PCR: NORMAL
SODIUM BLD-SCNC: 129 MMOL/L (ref 135–144)
SODIUM BLD-SCNC: 130 MMOL/L (ref 135–144)
SODIUM BLD-SCNC: 134 MMOL/L (ref 135–144)
SODIUM BLD-SCNC: 135 MMOL/L (ref 135–144)
SODIUM BLD-SCNC: 137 MMOL/L (ref 135–144)
SODIUM BLD-SCNC: 139 MMOL/L (ref 135–144)
SODIUM BLD-SCNC: 139 MMOL/L (ref 135–144)
SODIUM BLD-SCNC: 141 MMOL/L (ref 135–144)
SODIUM BLD-SCNC: 141 MMOL/L (ref 135–144)
SODIUM BLD-SCNC: 142 MMOL/L (ref 135–144)
SODIUM BLD-SCNC: 143 MMOL/L (ref 135–144)
SODIUM BLD-SCNC: 143 MMOL/L (ref 135–144)
SPECIFIC GRAVITY UA: 1.02 (ref 1–1.03)
SPECIMEN DESCRIPTION: ABNORMAL
SPECIMEN DESCRIPTION: NORMAL
TCO2 (CALC), ART: ABNORMAL MMOL/L (ref 22–29)
TOTAL CO2, VENOUS: ABNORMAL MMOL/L (ref 23–30)
TOTAL PROT. SUM,%: 102 % (ref 98–102)
TOTAL PROT. SUM: 6.4 G/DL (ref 6.3–8.2)
TOTAL PROTEIN: 6.4 G/DL (ref 6.4–8.3)
TOTAL PROTEIN: 6.5 G/DL (ref 6.4–8.3)
TOTAL PROTEIN: 6.6 G/DL (ref 6.4–8.3)
TOTAL PROTEIN: 6.8 G/DL (ref 6.4–8.3)
TOTAL PROTEIN: 6.9 G/DL (ref 6.4–8.3)
TOTAL PROTEIN: 7.1 G/DL (ref 6.4–8.3)
TOXIC TRICYCLIC SC,BLOOD: NEGATIVE
TRICHOMONAS: NORMAL
TROPONIN INTERP: ABNORMAL
TROPONIN T: ABNORMAL NG/ML
TROPONIN, HIGH SENSITIVITY: 26 NG/L (ref 0–22)
TROPONIN, HIGH SENSITIVITY: 30 NG/L (ref 0–22)
TROPONIN, HIGH SENSITIVITY: 31 NG/L (ref 0–22)
TROPONIN, HIGH SENSITIVITY: 38 NG/L (ref 0–22)
TROPONIN, HIGH SENSITIVITY: 69 NG/L (ref 0–22)
TROPONIN, HIGH SENSITIVITY: 77 NG/L (ref 0–22)
TURBIDITY: CLEAR
URINE HGB: ABNORMAL
UROBILINOGEN, URINE: NORMAL
VIBRIO PCR: NORMAL
VITAMIN D 25-HYDROXY: 83.8 NG/ML (ref 30–100)
WBC # BLD: 10.7 K/UL (ref 3.5–11.3)
WBC # BLD: 12 K/UL (ref 3.5–11.3)
WBC # BLD: 16.9 K/UL (ref 3.5–11.3)
WBC # BLD: 4.2 K/UL (ref 3.5–11.3)
WBC # BLD: 4.9 K/UL (ref 3.5–11.3)
WBC # BLD: 6.2 K/UL (ref 3.5–11.3)
WBC # BLD: 8.6 K/UL (ref 3.5–11.3)
WBC # BLD: 9.1 K/UL (ref 3.5–11.3)
WBC # BLD: 9.4 K/UL (ref 3.5–11.3)
WBC # BLD: 9.7 K/UL (ref 3.5–11.3)
WBC # BLD: 9.9 K/UL (ref 3.5–11.3)
WBC # BLD: ABNORMAL 10*3/UL
WBC UA: NORMAL /HPF (ref 0–5)
YEAST: NORMAL
YERSINIA ENTEROCOLITICA PCR: NORMAL
ZZ NTE CLEAN UP: ORDERED TEST: NORMAL
ZZ NTE CLEAN UP: ORDERED TEST: NORMAL
ZZ NTE WITH NAME CLEAN UP: SPECIMEN SOURCE: NORMAL
ZZ NTE WITH NAME CLEAN UP: SPECIMEN SOURCE: NORMAL

## 2022-01-01 PROCEDURE — 36415 COLL VENOUS BLD VENIPUNCTURE: CPT

## 2022-01-01 PROCEDURE — 6360000002 HC RX W HCPCS: Performed by: NURSE PRACTITIONER

## 2022-01-01 PROCEDURE — 97164 PT RE-EVAL EST PLAN CARE: CPT

## 2022-01-01 PROCEDURE — 6370000000 HC RX 637 (ALT 250 FOR IP): Performed by: INTERNAL MEDICINE

## 2022-01-01 PROCEDURE — 6360000002 HC RX W HCPCS: Performed by: INTERNAL MEDICINE

## 2022-01-01 PROCEDURE — 2700000000 HC OXYGEN THERAPY PER DAY

## 2022-01-01 PROCEDURE — 2580000003 HC RX 258: Performed by: INTERNAL MEDICINE

## 2022-01-01 PROCEDURE — 94761 N-INVAS EAR/PLS OXIMETRY MLT: CPT

## 2022-01-01 PROCEDURE — 94660 CPAP INITIATION&MGMT: CPT

## 2022-01-01 PROCEDURE — 2060000000 HC ICU INTERMEDIATE R&B

## 2022-01-01 PROCEDURE — 93971 EXTREMITY STUDY: CPT

## 2022-01-01 PROCEDURE — 85379 FIBRIN DEGRADATION QUANT: CPT

## 2022-01-01 PROCEDURE — 6370000000 HC RX 637 (ALT 250 FOR IP): Performed by: NURSE PRACTITIONER

## 2022-01-01 PROCEDURE — 51798 US URINE CAPACITY MEASURE: CPT

## 2022-01-01 PROCEDURE — 2580000003 HC RX 258: Performed by: NURSE PRACTITIONER

## 2022-01-01 PROCEDURE — 97530 THERAPEUTIC ACTIVITIES: CPT

## 2022-01-01 PROCEDURE — 80048 BASIC METABOLIC PNL TOTAL CA: CPT

## 2022-01-01 PROCEDURE — 6370000000 HC RX 637 (ALT 250 FOR IP): Performed by: FAMILY MEDICINE

## 2022-01-01 PROCEDURE — 85027 COMPLETE CBC AUTOMATED: CPT

## 2022-01-01 PROCEDURE — 99232 SBSQ HOSP IP/OBS MODERATE 35: CPT | Performed by: INTERNAL MEDICINE

## 2022-01-01 PROCEDURE — 2500000003 HC RX 250 WO HCPCS: Performed by: INTERNAL MEDICINE

## 2022-01-01 PROCEDURE — 87506 IADNA-DNA/RNA PROBE TQ 6-11: CPT

## 2022-01-01 PROCEDURE — 97112 NEUROMUSCULAR REEDUCATION: CPT

## 2022-01-01 PROCEDURE — 2000000000 HC ICU R&B

## 2022-01-01 PROCEDURE — 99221 1ST HOSP IP/OBS SF/LOW 40: CPT | Performed by: INTERNAL MEDICINE

## 2022-01-01 PROCEDURE — 83615 LACTATE (LD) (LDH) ENZYME: CPT

## 2022-01-01 PROCEDURE — 97110 THERAPEUTIC EXERCISES: CPT

## 2022-01-01 PROCEDURE — 71045 X-RAY EXAM CHEST 1 VIEW: CPT

## 2022-01-01 PROCEDURE — 87324 CLOSTRIDIUM AG IA: CPT

## 2022-01-01 PROCEDURE — 84155 ASSAY OF PROTEIN SERUM: CPT

## 2022-01-01 PROCEDURE — 85025 COMPLETE CBC W/AUTO DIFF WBC: CPT

## 2022-01-01 PROCEDURE — 83735 ASSAY OF MAGNESIUM: CPT

## 2022-01-01 PROCEDURE — 70450 CT HEAD/BRAIN W/O DYE: CPT

## 2022-01-01 PROCEDURE — 97535 SELF CARE MNGMENT TRAINING: CPT | Performed by: NURSE PRACTITIONER

## 2022-01-01 PROCEDURE — 86235 NUCLEAR ANTIGEN ANTIBODY: CPT

## 2022-01-01 PROCEDURE — 83605 ASSAY OF LACTIC ACID: CPT

## 2022-01-01 PROCEDURE — 99233 SBSQ HOSP IP/OBS HIGH 50: CPT | Performed by: NURSE PRACTITIONER

## 2022-01-01 PROCEDURE — 2500000003 HC RX 250 WO HCPCS: Performed by: NURSE PRACTITIONER

## 2022-01-01 PROCEDURE — 99232 SBSQ HOSP IP/OBS MODERATE 35: CPT | Performed by: FAMILY MEDICINE

## 2022-01-01 PROCEDURE — 84145 PROCALCITONIN (PCT): CPT

## 2022-01-01 PROCEDURE — 80076 HEPATIC FUNCTION PANEL: CPT

## 2022-01-01 PROCEDURE — C9113 INJ PANTOPRAZOLE SODIUM, VIA: HCPCS | Performed by: NURSE PRACTITIONER

## 2022-01-01 PROCEDURE — 87040 BLOOD CULTURE FOR BACTERIA: CPT

## 2022-01-01 PROCEDURE — 94640 AIRWAY INHALATION TREATMENT: CPT

## 2022-01-01 PROCEDURE — 80179 DRUG ASSAY SALICYLATE: CPT

## 2022-01-01 PROCEDURE — 82803 BLOOD GASES ANY COMBINATION: CPT

## 2022-01-01 PROCEDURE — 85610 PROTHROMBIN TIME: CPT

## 2022-01-01 PROCEDURE — 96372 THER/PROPH/DIAG INJ SC/IM: CPT

## 2022-01-01 PROCEDURE — 80143 DRUG ASSAY ACETAMINOPHEN: CPT

## 2022-01-01 PROCEDURE — 82306 VITAMIN D 25 HYDROXY: CPT

## 2022-01-01 PROCEDURE — 93005 ELECTROCARDIOGRAM TRACING: CPT | Performed by: NURSE PRACTITIONER

## 2022-01-01 PROCEDURE — 99238 HOSP IP/OBS DSCHRG MGMT 30/<: CPT | Performed by: INTERNAL MEDICINE

## 2022-01-01 PROCEDURE — 83883 ASSAY NEPHELOMETRY NOT SPEC: CPT

## 2022-01-01 PROCEDURE — APPSS30 APP SPLIT SHARED TIME 16-30 MINUTES: Performed by: NURSE PRACTITIONER

## 2022-01-01 PROCEDURE — 93005 ELECTROCARDIOGRAM TRACING: CPT | Performed by: INTERNAL MEDICINE

## 2022-01-01 PROCEDURE — 86140 C-REACTIVE PROTEIN: CPT

## 2022-01-01 PROCEDURE — 99222 1ST HOSP IP/OBS MODERATE 55: CPT | Performed by: NURSE PRACTITIONER

## 2022-01-01 PROCEDURE — 6360000002 HC RX W HCPCS: Performed by: FAMILY MEDICINE

## 2022-01-01 PROCEDURE — 86225 DNA ANTIBODY NATIVE: CPT

## 2022-01-01 PROCEDURE — 6360000002 HC RX W HCPCS

## 2022-01-01 PROCEDURE — 93005 ELECTROCARDIOGRAM TRACING: CPT | Performed by: EMERGENCY MEDICINE

## 2022-01-01 PROCEDURE — 96374 THER/PROPH/DIAG INJ IV PUSH: CPT

## 2022-01-01 PROCEDURE — 6360000002 HC RX W HCPCS: Performed by: EMERGENCY MEDICINE

## 2022-01-01 PROCEDURE — 84165 PROTEIN E-PHORESIS SERUM: CPT

## 2022-01-01 PROCEDURE — 86160 COMPLEMENT ANTIGEN: CPT

## 2022-01-01 PROCEDURE — 87449 NOS EACH ORGANISM AG IA: CPT

## 2022-01-01 PROCEDURE — 97535 SELF CARE MNGMENT TRAINING: CPT

## 2022-01-01 PROCEDURE — 76705 ECHO EXAM OF ABDOMEN: CPT

## 2022-01-01 PROCEDURE — 80307 DRUG TEST PRSMV CHEM ANLYZR: CPT

## 2022-01-01 PROCEDURE — 81001 URINALYSIS AUTO W/SCOPE: CPT

## 2022-01-01 PROCEDURE — 80051 ELECTROLYTE PANEL: CPT

## 2022-01-01 PROCEDURE — 86038 ANTINUCLEAR ANTIBODIES: CPT

## 2022-01-01 PROCEDURE — G0480 DRUG TEST DEF 1-7 CLASSES: HCPCS

## 2022-01-01 PROCEDURE — 1250000000 HC SEMI PRIVATE HOSPICE R&B

## 2022-01-01 PROCEDURE — 93306 TTE W/DOPPLER COMPLETE: CPT

## 2022-01-01 PROCEDURE — 99231 SBSQ HOSP IP/OBS SF/LOW 25: CPT | Performed by: INTERNAL MEDICINE

## 2022-01-01 PROCEDURE — 85730 THROMBOPLASTIN TIME PARTIAL: CPT

## 2022-01-01 PROCEDURE — 2580000003 HC RX 258: Performed by: EMERGENCY MEDICINE

## 2022-01-01 PROCEDURE — 97163 PT EVAL HIGH COMPLEX 45 MIN: CPT

## 2022-01-01 PROCEDURE — 99283 EMERGENCY DEPT VISIT LOW MDM: CPT

## 2022-01-01 PROCEDURE — 36600 WITHDRAWAL OF ARTERIAL BLOOD: CPT

## 2022-01-01 PROCEDURE — 76770 US EXAM ABDO BACK WALL COMP: CPT

## 2022-01-01 PROCEDURE — 83874 ASSAY OF MYOGLOBIN: CPT

## 2022-01-01 PROCEDURE — 97167 OT EVAL HIGH COMPLEX 60 MIN: CPT

## 2022-01-01 PROCEDURE — 74018 RADEX ABDOMEN 1 VIEW: CPT

## 2022-01-01 PROCEDURE — 82728 ASSAY OF FERRITIN: CPT

## 2022-01-01 PROCEDURE — 85384 FIBRINOGEN ACTIVITY: CPT

## 2022-01-01 PROCEDURE — 84484 ASSAY OF TROPONIN QUANT: CPT

## 2022-01-01 PROCEDURE — 80074 ACUTE HEPATITIS PANEL: CPT

## 2022-01-01 PROCEDURE — 83880 ASSAY OF NATRIURETIC PEPTIDE: CPT

## 2022-01-01 PROCEDURE — 80053 COMPREHEN METABOLIC PANEL: CPT

## 2022-01-01 PROCEDURE — 82140 ASSAY OF AMMONIA: CPT

## 2022-01-01 PROCEDURE — 99223 1ST HOSP IP/OBS HIGH 75: CPT | Performed by: NURSE PRACTITIONER

## 2022-01-01 PROCEDURE — 99232 SBSQ HOSP IP/OBS MODERATE 35: CPT | Performed by: NURSE PRACTITIONER

## 2022-01-01 PROCEDURE — 97116 GAIT TRAINING THERAPY: CPT

## 2022-01-01 PROCEDURE — 87635 SARS-COV-2 COVID-19 AMP PRB: CPT

## 2022-01-01 PROCEDURE — APPSS60 APP SPLIT SHARED TIME 46-60 MINUTES: Performed by: NURSE PRACTITIONER

## 2022-01-01 PROCEDURE — 86334 IMMUNOFIX E-PHORESIS SERUM: CPT

## 2022-01-01 RX ORDER — ACETAMINOPHEN 650 MG/1
650 SUPPOSITORY RECTAL EVERY 6 HOURS PRN
Status: DISCONTINUED | OUTPATIENT
Start: 2022-01-01 | End: 2022-01-31 | Stop reason: HOSPADM

## 2022-01-01 RX ORDER — ONDANSETRON 2 MG/ML
4 INJECTION INTRAMUSCULAR; INTRAVENOUS EVERY 6 HOURS PRN
Status: CANCELLED | OUTPATIENT
Start: 2022-01-01

## 2022-01-01 RX ORDER — POTASSIUM CHLORIDE 20 MEQ/1
40 TABLET, EXTENDED RELEASE ORAL ONCE
Status: COMPLETED | OUTPATIENT
Start: 2022-01-01 | End: 2022-01-01

## 2022-01-01 RX ORDER — BUDESONIDE 0.5 MG/2ML
0.5 INHALANT ORAL 2 TIMES DAILY
Status: DISCONTINUED | OUTPATIENT
Start: 2022-01-01 | End: 2022-01-01 | Stop reason: HOSPADM

## 2022-01-01 RX ORDER — IBUPROFEN 400 MG/1
400 TABLET ORAL EVERY 6 HOURS PRN
Status: DISCONTINUED | OUTPATIENT
Start: 2022-01-01 | End: 2022-01-01 | Stop reason: HOSPADM

## 2022-01-01 RX ORDER — ZIPRASIDONE MESYLATE 20 MG/ML
20 INJECTION, POWDER, LYOPHILIZED, FOR SOLUTION INTRAMUSCULAR ONCE
Status: COMPLETED | OUTPATIENT
Start: 2022-01-01 | End: 2022-01-01

## 2022-01-01 RX ORDER — SPIRONOLACTONE 25 MG/1
25 TABLET ORAL 2 TIMES DAILY
Status: DISCONTINUED | OUTPATIENT
Start: 2022-01-01 | End: 2022-01-01 | Stop reason: HOSPADM

## 2022-01-01 RX ORDER — LORAZEPAM 2 MG/ML
1 INJECTION INTRAMUSCULAR EVERY 6 HOURS PRN
Status: CANCELLED | OUTPATIENT
Start: 2022-01-01

## 2022-01-01 RX ORDER — ACETAMINOPHEN 325 MG/1
650 TABLET ORAL EVERY 6 HOURS PRN
Status: DISCONTINUED | OUTPATIENT
Start: 2022-01-01 | End: 2022-01-31 | Stop reason: HOSPADM

## 2022-01-01 RX ORDER — RIVASTIGMINE 4.6 MG/24H
1 PATCH, EXTENDED RELEASE TRANSDERMAL DAILY
COMMUNITY

## 2022-01-01 RX ORDER — POTASSIUM CHLORIDE 20 MEQ/1
20 TABLET, EXTENDED RELEASE ORAL ONCE
Status: COMPLETED | OUTPATIENT
Start: 2022-01-01 | End: 2022-01-01

## 2022-01-01 RX ORDER — QUETIAPINE FUMARATE 25 MG/1
100 TABLET, FILM COATED ORAL 2 TIMES DAILY
Status: DISCONTINUED | OUTPATIENT
Start: 2022-01-01 | End: 2022-01-31 | Stop reason: HOSPADM

## 2022-01-01 RX ORDER — QUETIAPINE FUMARATE 25 MG/1
25 TABLET, FILM COATED ORAL 2 TIMES DAILY
Status: DISCONTINUED | OUTPATIENT
Start: 2022-01-01 | End: 2022-01-01

## 2022-01-01 RX ORDER — GLYCOPYRROLATE 1 MG/5 ML
0.2 SYRINGE (ML) INTRAVENOUS EVERY 4 HOURS PRN
Status: CANCELLED | OUTPATIENT
Start: 2022-01-01

## 2022-01-01 RX ORDER — POTASSIUM CHLORIDE 20MEQ/15ML
40 LIQUID (ML) ORAL PRN
Status: DISCONTINUED | OUTPATIENT
Start: 2022-01-01 | End: 2022-01-01 | Stop reason: HOSPADM

## 2022-01-01 RX ORDER — IBUPROFEN 400 MG/1
400 TABLET ORAL EVERY 6 HOURS PRN
Status: CANCELLED | OUTPATIENT
Start: 2022-01-01

## 2022-01-01 RX ORDER — ACETAMINOPHEN 325 MG/1
650 TABLET ORAL EVERY 6 HOURS PRN
Status: DISCONTINUED | OUTPATIENT
Start: 2022-01-01 | End: 2022-01-01 | Stop reason: HOSPADM

## 2022-01-01 RX ORDER — SODIUM CHLORIDE 9 MG/ML
25 INJECTION, SOLUTION INTRAVENOUS PRN
Status: CANCELLED | OUTPATIENT
Start: 2022-01-01

## 2022-01-01 RX ORDER — SODIUM CHLORIDE 0.9 % (FLUSH) 0.9 %
10 SYRINGE (ML) INJECTION PRN
Status: DISCONTINUED | OUTPATIENT
Start: 2022-01-01 | End: 2022-01-31 | Stop reason: HOSPADM

## 2022-01-01 RX ORDER — GUAIFENESIN/DEXTROMETHORPHAN 100-10MG/5
5 SYRUP ORAL EVERY 4 HOURS PRN
Status: CANCELLED | OUTPATIENT
Start: 2022-01-01

## 2022-01-01 RX ORDER — FENTANYL CITRATE 50 UG/ML
50 INJECTION, SOLUTION INTRAMUSCULAR; INTRAVENOUS
Status: DISCONTINUED | OUTPATIENT
Start: 2022-01-01 | End: 2022-01-01

## 2022-01-01 RX ORDER — LORAZEPAM 2 MG/ML
1 INJECTION INTRAMUSCULAR EVERY 4 HOURS PRN
Status: DISCONTINUED | OUTPATIENT
Start: 2022-01-01 | End: 2022-01-01

## 2022-01-01 RX ORDER — SODIUM CHLORIDE 0.9 % (FLUSH) 0.9 %
5-40 SYRINGE (ML) INJECTION EVERY 12 HOURS SCHEDULED
Status: DISCONTINUED | OUTPATIENT
Start: 2022-01-01 | End: 2022-01-01 | Stop reason: HOSPADM

## 2022-01-01 RX ORDER — FINASTERIDE 5 MG/1
5 TABLET, FILM COATED ORAL DAILY
Status: DISCONTINUED | OUTPATIENT
Start: 2022-01-01 | End: 2022-01-31 | Stop reason: HOSPADM

## 2022-01-01 RX ORDER — HYDROCODONE BITARTRATE AND ACETAMINOPHEN 10; 325 MG/1; MG/1
1 TABLET ORAL EVERY 4 HOURS PRN
Status: CANCELLED | OUTPATIENT
Start: 2022-01-01

## 2022-01-01 RX ORDER — MORPHINE SULFATE 4 MG/ML
4 INJECTION, SOLUTION INTRAMUSCULAR; INTRAVENOUS
Status: DISCONTINUED | OUTPATIENT
Start: 2022-01-01 | End: 2022-01-01 | Stop reason: HOSPADM

## 2022-01-01 RX ORDER — QUETIAPINE FUMARATE 100 MG/1
100 TABLET, FILM COATED ORAL 2 TIMES DAILY
Status: DISCONTINUED | OUTPATIENT
Start: 2022-01-01 | End: 2022-01-01 | Stop reason: HOSPADM

## 2022-01-01 RX ORDER — LORAZEPAM 2 MG/ML
1 INJECTION INTRAMUSCULAR ONCE
Status: COMPLETED | OUTPATIENT
Start: 2022-01-01 | End: 2022-01-01

## 2022-01-01 RX ORDER — DIPHENHYDRAMINE HYDROCHLORIDE 50 MG/ML
25 INJECTION INTRAMUSCULAR; INTRAVENOUS ONCE
Status: COMPLETED | OUTPATIENT
Start: 2022-01-01 | End: 2022-01-01

## 2022-01-01 RX ORDER — MORPHINE SULFATE 2 MG/ML
2 INJECTION, SOLUTION INTRAMUSCULAR; INTRAVENOUS
Status: CANCELLED | OUTPATIENT
Start: 2022-01-01

## 2022-01-01 RX ORDER — IPRATROPIUM BROMIDE AND ALBUTEROL SULFATE 2.5; .5 MG/3ML; MG/3ML
1 SOLUTION RESPIRATORY (INHALATION) 4 TIMES DAILY
Status: DISCONTINUED | OUTPATIENT
Start: 2022-01-01 | End: 2022-01-01

## 2022-01-01 RX ORDER — DULOXETIN HYDROCHLORIDE 60 MG/1
60 CAPSULE, DELAYED RELEASE ORAL DAILY
Status: CANCELLED | OUTPATIENT
Start: 2022-01-01

## 2022-01-01 RX ORDER — FUROSEMIDE 10 MG/ML
40 INJECTION INTRAMUSCULAR; INTRAVENOUS DAILY
Status: DISCONTINUED | OUTPATIENT
Start: 2022-01-01 | End: 2022-01-01 | Stop reason: HOSPADM

## 2022-01-01 RX ORDER — GABAPENTIN 100 MG/1
100 CAPSULE ORAL 3 TIMES DAILY
Status: DISCONTINUED | OUTPATIENT
Start: 2022-01-01 | End: 2022-01-01 | Stop reason: HOSPADM

## 2022-01-01 RX ORDER — LORAZEPAM 2 MG/ML
2 INJECTION INTRAMUSCULAR EVERY 6 HOURS PRN
Status: DISCONTINUED | OUTPATIENT
Start: 2022-01-01 | End: 2022-01-01 | Stop reason: HOSPADM

## 2022-01-01 RX ORDER — HYDROCODONE BITARTRATE AND ACETAMINOPHEN 5; 325 MG/1; MG/1
1 TABLET ORAL EVERY 4 HOURS PRN
Status: DISCONTINUED | OUTPATIENT
Start: 2022-01-01 | End: 2022-01-01

## 2022-01-01 RX ORDER — BUDESONIDE 0.5 MG/2ML
0.5 INHALANT ORAL 2 TIMES DAILY
Status: DISCONTINUED | OUTPATIENT
Start: 2022-01-01 | End: 2022-01-31 | Stop reason: HOSPADM

## 2022-01-01 RX ORDER — FUROSEMIDE 10 MG/ML
40 INJECTION INTRAMUSCULAR; INTRAVENOUS ONCE
Status: COMPLETED | OUTPATIENT
Start: 2022-01-01 | End: 2022-01-01

## 2022-01-01 RX ORDER — SODIUM CHLORIDE 0.9 % (FLUSH) 0.9 %
10 SYRINGE (ML) INJECTION PRN
Status: DISCONTINUED | OUTPATIENT
Start: 2022-01-01 | End: 2022-01-01 | Stop reason: HOSPADM

## 2022-01-01 RX ORDER — ACETAMINOPHEN 650 MG/1
650 SUPPOSITORY RECTAL EVERY 6 HOURS PRN
Status: CANCELLED | OUTPATIENT
Start: 2022-01-01

## 2022-01-01 RX ORDER — IBUPROFEN 400 MG/1
400 TABLET ORAL EVERY 6 HOURS PRN
Status: DISCONTINUED | OUTPATIENT
Start: 2022-01-01 | End: 2022-01-31 | Stop reason: HOSPADM

## 2022-01-01 RX ORDER — MIDAZOLAM HYDROCHLORIDE 1 MG/ML
4 INJECTION INTRAMUSCULAR; INTRAVENOUS ONCE
Status: COMPLETED | OUTPATIENT
Start: 2022-01-01 | End: 2022-01-01

## 2022-01-01 RX ORDER — FAMOTIDINE 20 MG/1
20 TABLET, FILM COATED ORAL 2 TIMES DAILY
Status: DISCONTINUED | OUTPATIENT
Start: 2022-01-01 | End: 2022-01-01 | Stop reason: HOSPADM

## 2022-01-01 RX ORDER — ALBUTEROL SULFATE 2.5 MG/3ML
2.5 SOLUTION RESPIRATORY (INHALATION) EVERY 4 HOURS PRN
COMMUNITY

## 2022-01-01 RX ORDER — POTASSIUM CHLORIDE 20 MEQ/1
40 TABLET, EXTENDED RELEASE ORAL PRN
Status: DISCONTINUED | OUTPATIENT
Start: 2022-01-01 | End: 2022-01-01 | Stop reason: HOSPADM

## 2022-01-01 RX ORDER — GABAPENTIN 100 MG/1
100 CAPSULE ORAL 3 TIMES DAILY
Status: CANCELLED | OUTPATIENT
Start: 2022-01-01

## 2022-01-01 RX ORDER — DEXAMETHASONE SODIUM PHOSPHATE 10 MG/ML
10 INJECTION, SOLUTION INTRAMUSCULAR; INTRAVENOUS ONCE
Status: COMPLETED | OUTPATIENT
Start: 2022-01-01 | End: 2022-01-01

## 2022-01-01 RX ORDER — LORAZEPAM 2 MG/ML
2 INJECTION INTRAMUSCULAR EVERY 6 HOURS PRN
Status: DISCONTINUED | OUTPATIENT
Start: 2022-01-01 | End: 2022-01-01

## 2022-01-01 RX ORDER — MORPHINE SULFATE 2 MG/ML
2 INJECTION, SOLUTION INTRAMUSCULAR; INTRAVENOUS
Status: DISCONTINUED | OUTPATIENT
Start: 2022-01-01 | End: 2022-01-01

## 2022-01-01 RX ORDER — GUAIFENESIN/DEXTROMETHORPHAN 100-10MG/5
5 SYRUP ORAL EVERY 4 HOURS PRN
Status: DISCONTINUED | OUTPATIENT
Start: 2022-01-01 | End: 2022-01-01 | Stop reason: HOSPADM

## 2022-01-01 RX ORDER — FAMOTIDINE 20 MG/1
40 TABLET, FILM COATED ORAL DAILY
Status: DISCONTINUED | OUTPATIENT
Start: 2022-01-01 | End: 2022-01-01

## 2022-01-01 RX ORDER — ZIPRASIDONE MESYLATE 20 MG/ML
INJECTION, POWDER, LYOPHILIZED, FOR SOLUTION INTRAMUSCULAR
Status: COMPLETED
Start: 2022-01-01 | End: 2022-01-01

## 2022-01-01 RX ORDER — FAMOTIDINE 20 MG/1
20 TABLET, FILM COATED ORAL 2 TIMES DAILY
Status: CANCELLED | OUTPATIENT
Start: 2022-01-01

## 2022-01-01 RX ORDER — FUROSEMIDE 10 MG/ML
20 INJECTION INTRAMUSCULAR; INTRAVENOUS ONCE
Status: COMPLETED | OUTPATIENT
Start: 2022-01-01 | End: 2022-01-01

## 2022-01-01 RX ORDER — METOPROLOL TARTRATE 5 MG/5ML
5 INJECTION INTRAVENOUS EVERY 6 HOURS PRN
Status: DISCONTINUED | OUTPATIENT
Start: 2022-01-01 | End: 2022-01-01 | Stop reason: HOSPADM

## 2022-01-01 RX ORDER — CETIRIZINE HYDROCHLORIDE 10 MG/1
10 TABLET ORAL DAILY
Status: CANCELLED | OUTPATIENT
Start: 2022-01-01

## 2022-01-01 RX ORDER — HALOPERIDOL 5 MG/ML
5 INJECTION INTRAMUSCULAR ONCE
Status: COMPLETED | OUTPATIENT
Start: 2022-01-01 | End: 2022-01-01

## 2022-01-01 RX ORDER — ACETAMINOPHEN 650 MG/1
650 SUPPOSITORY RECTAL EVERY 6 HOURS PRN
Status: DISCONTINUED | OUTPATIENT
Start: 2022-01-01 | End: 2022-01-01 | Stop reason: HOSPADM

## 2022-01-01 RX ORDER — ONDANSETRON 4 MG/1
4 TABLET, ORALLY DISINTEGRATING ORAL EVERY 8 HOURS PRN
Status: CANCELLED | OUTPATIENT
Start: 2022-01-01

## 2022-01-01 RX ORDER — QUETIAPINE FUMARATE 25 MG/1
50 TABLET, FILM COATED ORAL NIGHTLY
Status: DISCONTINUED | OUTPATIENT
Start: 2022-01-01 | End: 2022-01-01

## 2022-01-01 RX ORDER — ONDANSETRON 2 MG/ML
4 INJECTION INTRAMUSCULAR; INTRAVENOUS EVERY 6 HOURS PRN
Status: DISCONTINUED | OUTPATIENT
Start: 2022-01-01 | End: 2022-01-31 | Stop reason: HOSPADM

## 2022-01-01 RX ORDER — 0.9 % SODIUM CHLORIDE 0.9 %
1000 INTRAVENOUS SOLUTION INTRAVENOUS ONCE
Status: COMPLETED | OUTPATIENT
Start: 2022-01-01 | End: 2022-01-01

## 2022-01-01 RX ORDER — SODIUM CHLORIDE 9 MG/ML
10 INJECTION INTRAVENOUS DAILY
Status: DISCONTINUED | OUTPATIENT
Start: 2022-01-01 | End: 2022-01-01

## 2022-01-01 RX ORDER — SPIRONOLACTONE 25 MG/1
25 TABLET ORAL 2 TIMES DAILY
COMMUNITY

## 2022-01-01 RX ORDER — QUETIAPINE FUMARATE 25 MG/1
75 TABLET, FILM COATED ORAL 2 TIMES DAILY
Status: DISCONTINUED | OUTPATIENT
Start: 2022-01-01 | End: 2022-01-01

## 2022-01-01 RX ORDER — CETIRIZINE HYDROCHLORIDE 10 MG/1
10 TABLET ORAL DAILY
Status: DISCONTINUED | OUTPATIENT
Start: 2022-01-01 | End: 2022-01-31 | Stop reason: HOSPADM

## 2022-01-01 RX ORDER — ALBUTEROL SULFATE 2.5 MG/3ML
2.5 SOLUTION RESPIRATORY (INHALATION) EVERY 4 HOURS PRN
Status: CANCELLED | OUTPATIENT
Start: 2022-01-01

## 2022-01-01 RX ORDER — MAGNESIUM HYDROXIDE/ALUMINUM HYDROXICE/SIMETHICONE 120; 1200; 1200 MG/30ML; MG/30ML; MG/30ML
30 SUSPENSION ORAL EVERY 6 HOURS PRN
Status: DISCONTINUED | OUTPATIENT
Start: 2022-01-01 | End: 2022-01-31 | Stop reason: HOSPADM

## 2022-01-01 RX ORDER — SIMETHICONE 80 MG
80 TABLET,CHEWABLE ORAL EVERY 6 HOURS PRN
Status: DISCONTINUED | OUTPATIENT
Start: 2022-01-01 | End: 2022-01-01

## 2022-01-01 RX ORDER — FINASTERIDE 5 MG/1
5 TABLET, FILM COATED ORAL DAILY
Status: DISCONTINUED | OUTPATIENT
Start: 2022-01-01 | End: 2022-01-01 | Stop reason: HOSPADM

## 2022-01-01 RX ORDER — SODIUM CHLORIDE 0.9 % (FLUSH) 0.9 %
5-40 SYRINGE (ML) INJECTION EVERY 12 HOURS SCHEDULED
Status: DISCONTINUED | OUTPATIENT
Start: 2022-01-01 | End: 2022-01-31 | Stop reason: HOSPADM

## 2022-01-01 RX ORDER — QUETIAPINE FUMARATE 25 MG/1
50 TABLET, FILM COATED ORAL 2 TIMES DAILY
Status: DISCONTINUED | OUTPATIENT
Start: 2022-01-01 | End: 2022-01-01

## 2022-01-01 RX ORDER — HYDROCODONE BITARTRATE AND ACETAMINOPHEN 10; 325 MG/1; MG/1
1 TABLET ORAL EVERY 4 HOURS PRN
Status: DISCONTINUED | OUTPATIENT
Start: 2022-01-01 | End: 2022-01-31 | Stop reason: HOSPADM

## 2022-01-01 RX ORDER — DEXAMETHASONE 4 MG/1
6 TABLET ORAL DAILY
Status: DISCONTINUED | OUTPATIENT
Start: 2022-01-01 | End: 2022-01-01

## 2022-01-01 RX ORDER — ZIPRASIDONE MESYLATE 20 MG/ML
20 INJECTION, POWDER, LYOPHILIZED, FOR SOLUTION INTRAMUSCULAR ONCE
Status: DISCONTINUED | OUTPATIENT
Start: 2022-01-01 | End: 2022-01-01

## 2022-01-01 RX ORDER — POTASSIUM CHLORIDE 7.45 MG/ML
10 INJECTION INTRAVENOUS PRN
Status: DISCONTINUED | OUTPATIENT
Start: 2022-01-01 | End: 2022-01-01 | Stop reason: HOSPADM

## 2022-01-01 RX ORDER — AZITHROMYCIN 500 MG/1
500 TABLET, FILM COATED ORAL DAILY
COMMUNITY

## 2022-01-01 RX ORDER — QUETIAPINE FUMARATE 25 MG/1
100 TABLET, FILM COATED ORAL 2 TIMES DAILY
Status: CANCELLED | OUTPATIENT
Start: 2022-01-01

## 2022-01-01 RX ORDER — FENTANYL CITRATE 50 UG/ML
25 INJECTION, SOLUTION INTRAMUSCULAR; INTRAVENOUS
Status: DISCONTINUED | OUTPATIENT
Start: 2022-01-01 | End: 2022-01-01

## 2022-01-01 RX ORDER — DULOXETIN HYDROCHLORIDE 60 MG/1
60 CAPSULE, DELAYED RELEASE ORAL DAILY
Status: DISCONTINUED | OUTPATIENT
Start: 2022-01-01 | End: 2022-01-31 | Stop reason: HOSPADM

## 2022-01-01 RX ORDER — LORAZEPAM 2 MG/ML
2 INJECTION INTRAMUSCULAR
Status: DISCONTINUED | OUTPATIENT
Start: 2022-01-01 | End: 2022-01-31 | Stop reason: HOSPADM

## 2022-01-01 RX ORDER — MORPHINE SULFATE 2 MG/ML
2 INJECTION, SOLUTION INTRAMUSCULAR; INTRAVENOUS
Status: DISCONTINUED | OUTPATIENT
Start: 2022-01-01 | End: 2022-01-01 | Stop reason: HOSPADM

## 2022-01-01 RX ORDER — GABAPENTIN 100 MG/1
100 CAPSULE ORAL 3 TIMES DAILY
Status: DISCONTINUED | OUTPATIENT
Start: 2022-01-01 | End: 2022-01-31 | Stop reason: HOSPADM

## 2022-01-01 RX ORDER — FAMOTIDINE 20 MG/1
20 TABLET, FILM COATED ORAL 2 TIMES DAILY
Status: DISCONTINUED | OUTPATIENT
Start: 2022-01-01 | End: 2022-01-31 | Stop reason: HOSPADM

## 2022-01-01 RX ORDER — LORAZEPAM 2 MG/ML
1 INJECTION INTRAMUSCULAR
Status: DISCONTINUED | OUTPATIENT
Start: 2022-01-01 | End: 2022-01-31 | Stop reason: HOSPADM

## 2022-01-01 RX ORDER — ALBUTEROL SULFATE 2.5 MG/3ML
2.5 SOLUTION RESPIRATORY (INHALATION) EVERY 4 HOURS PRN
Status: DISCONTINUED | OUTPATIENT
Start: 2022-01-01 | End: 2022-01-01 | Stop reason: HOSPADM

## 2022-01-01 RX ORDER — GUAIFENESIN/DEXTROMETHORPHAN 100-10MG/5
5 SYRUP ORAL EVERY 4 HOURS PRN
Status: DISCONTINUED | OUTPATIENT
Start: 2022-01-01 | End: 2022-01-31 | Stop reason: HOSPADM

## 2022-01-01 RX ORDER — OXYCODONE HYDROCHLORIDE 15 MG/1
60 TABLET, FILM COATED, EXTENDED RELEASE ORAL 3 TIMES DAILY
Status: DISCONTINUED | OUTPATIENT
Start: 2022-01-01 | End: 2022-01-01 | Stop reason: HOSPADM

## 2022-01-01 RX ORDER — ALBUTEROL SULFATE 2.5 MG/3ML
2.5 SOLUTION RESPIRATORY (INHALATION) EVERY 4 HOURS PRN
Status: DISCONTINUED | OUTPATIENT
Start: 2022-01-01 | End: 2022-01-01

## 2022-01-01 RX ORDER — ONDANSETRON 4 MG/1
4 TABLET, ORALLY DISINTEGRATING ORAL EVERY 8 HOURS PRN
Status: DISCONTINUED | OUTPATIENT
Start: 2022-01-01 | End: 2022-01-31 | Stop reason: HOSPADM

## 2022-01-01 RX ORDER — BUDESONIDE 0.5 MG/2ML
0.5 INHALANT ORAL 2 TIMES DAILY
Status: CANCELLED | OUTPATIENT
Start: 2022-01-01

## 2022-01-01 RX ORDER — DULOXETIN HYDROCHLORIDE 60 MG/1
60 CAPSULE, DELAYED RELEASE ORAL DAILY
Status: DISCONTINUED | OUTPATIENT
Start: 2022-01-01 | End: 2022-01-01 | Stop reason: HOSPADM

## 2022-01-01 RX ORDER — ALBUTEROL SULFATE 2.5 MG/3ML
2.5 SOLUTION RESPIRATORY (INHALATION) 4 TIMES DAILY
Status: DISCONTINUED | OUTPATIENT
Start: 2022-01-01 | End: 2022-01-01

## 2022-01-01 RX ORDER — MORPHINE SULFATE 4 MG/ML
4 INJECTION, SOLUTION INTRAMUSCULAR; INTRAVENOUS
Status: DISCONTINUED | OUTPATIENT
Start: 2022-01-01 | End: 2022-01-01

## 2022-01-01 RX ORDER — ACETAMINOPHEN 325 MG/1
650 TABLET ORAL EVERY 6 HOURS PRN
Status: CANCELLED | OUTPATIENT
Start: 2022-01-01

## 2022-01-01 RX ORDER — SODIUM CHLORIDE 0.9 % (FLUSH) 0.9 %
10 SYRINGE (ML) INJECTION PRN
Status: CANCELLED | OUTPATIENT
Start: 2022-01-01

## 2022-01-01 RX ORDER — MAGNESIUM HYDROXIDE/ALUMINUM HYDROXICE/SIMETHICONE 120; 1200; 1200 MG/30ML; MG/30ML; MG/30ML
30 SUSPENSION ORAL EVERY 6 HOURS PRN
Status: CANCELLED | OUTPATIENT
Start: 2022-01-01

## 2022-01-01 RX ORDER — GLYCOPYRROLATE 1 MG/5 ML
0.2 SYRINGE (ML) INTRAVENOUS EVERY 4 HOURS PRN
Status: DISCONTINUED | OUTPATIENT
Start: 2022-01-01 | End: 2022-01-01 | Stop reason: HOSPADM

## 2022-01-01 RX ORDER — ONDANSETRON 4 MG/1
4 TABLET, ORALLY DISINTEGRATING ORAL EVERY 8 HOURS PRN
Status: DISCONTINUED | OUTPATIENT
Start: 2022-01-01 | End: 2022-01-01 | Stop reason: HOSPADM

## 2022-01-01 RX ORDER — LORAZEPAM 2 MG/ML
2 INJECTION INTRAMUSCULAR EVERY 6 HOURS PRN
Status: CANCELLED | OUTPATIENT
Start: 2022-01-01

## 2022-01-01 RX ORDER — HYDROCODONE BITARTRATE AND ACETAMINOPHEN 5; 325 MG/1; MG/1
2 TABLET ORAL EVERY 4 HOURS PRN
Status: DISCONTINUED | OUTPATIENT
Start: 2022-01-01 | End: 2022-01-01

## 2022-01-01 RX ORDER — RIVASTIGMINE 4.6 MG/24H
1 PATCH, EXTENDED RELEASE TRANSDERMAL DAILY
Status: DISCONTINUED | OUTPATIENT
Start: 2022-01-01 | End: 2022-01-31 | Stop reason: HOSPADM

## 2022-01-01 RX ORDER — PANTOPRAZOLE SODIUM 40 MG/10ML
40 INJECTION, POWDER, LYOPHILIZED, FOR SOLUTION INTRAVENOUS DAILY
Status: DISCONTINUED | OUTPATIENT
Start: 2022-01-01 | End: 2022-01-01

## 2022-01-01 RX ORDER — SODIUM CHLORIDE 0.9 % (FLUSH) 0.9 %
5-40 SYRINGE (ML) INJECTION EVERY 12 HOURS SCHEDULED
Status: CANCELLED | OUTPATIENT
Start: 2022-01-01

## 2022-01-01 RX ORDER — HYDROCODONE BITARTRATE AND ACETAMINOPHEN 10; 325 MG/1; MG/1
1 TABLET ORAL EVERY 4 HOURS PRN
Status: DISCONTINUED | OUTPATIENT
Start: 2022-01-01 | End: 2022-01-01 | Stop reason: HOSPADM

## 2022-01-01 RX ORDER — HYDROMORPHONE HYDROCHLORIDE 1 MG/ML
1 INJECTION, SOLUTION INTRAMUSCULAR; INTRAVENOUS; SUBCUTANEOUS
Status: DISCONTINUED | OUTPATIENT
Start: 2022-01-01 | End: 2022-01-31 | Stop reason: HOSPADM

## 2022-01-01 RX ORDER — ACETAMINOPHEN 160 MG
50 TABLET,DISINTEGRATING ORAL WEEKLY
COMMUNITY

## 2022-01-01 RX ORDER — ONDANSETRON 2 MG/ML
4 INJECTION INTRAMUSCULAR; INTRAVENOUS EVERY 6 HOURS PRN
Status: DISCONTINUED | OUTPATIENT
Start: 2022-01-01 | End: 2022-01-01 | Stop reason: HOSPADM

## 2022-01-01 RX ORDER — ALBUTEROL SULFATE 2.5 MG/3ML
2.5 SOLUTION RESPIRATORY (INHALATION) 2 TIMES DAILY
Status: DISCONTINUED | OUTPATIENT
Start: 2022-01-01 | End: 2022-01-01

## 2022-01-01 RX ORDER — SODIUM CHLORIDE 9 MG/ML
25 INJECTION, SOLUTION INTRAVENOUS PRN
Status: DISCONTINUED | OUTPATIENT
Start: 2022-01-01 | End: 2022-01-31 | Stop reason: HOSPADM

## 2022-01-01 RX ORDER — RIVASTIGMINE 4.6 MG/24H
1 PATCH, EXTENDED RELEASE TRANSDERMAL DAILY
Status: DISCONTINUED | OUTPATIENT
Start: 2022-01-01 | End: 2022-01-01 | Stop reason: HOSPADM

## 2022-01-01 RX ORDER — DEXAMETHASONE SODIUM PHOSPHATE 10 MG/ML
6 INJECTION, SOLUTION INTRAMUSCULAR; INTRAVENOUS EVERY 24 HOURS
Status: DISCONTINUED | OUTPATIENT
Start: 2022-01-01 | End: 2022-01-01

## 2022-01-01 RX ORDER — LORAZEPAM 2 MG/ML
0.5 INJECTION INTRAMUSCULAR EVERY 4 HOURS PRN
Status: DISCONTINUED | OUTPATIENT
Start: 2022-01-01 | End: 2022-01-01

## 2022-01-01 RX ORDER — LORAZEPAM 2 MG/ML
1 INJECTION INTRAMUSCULAR EVERY 6 HOURS PRN
Status: DISCONTINUED | OUTPATIENT
Start: 2022-01-01 | End: 2022-01-01 | Stop reason: HOSPADM

## 2022-01-01 RX ORDER — LORAZEPAM 2 MG/ML
1 INJECTION INTRAMUSCULAR EVERY 6 HOURS PRN
Status: DISCONTINUED | OUTPATIENT
Start: 2022-01-01 | End: 2022-01-01

## 2022-01-01 RX ORDER — SODIUM CHLORIDE 9 MG/ML
25 INJECTION, SOLUTION INTRAVENOUS PRN
Status: DISCONTINUED | OUTPATIENT
Start: 2022-01-01 | End: 2022-01-01 | Stop reason: HOSPADM

## 2022-01-01 RX ORDER — DEXAMETHASONE SODIUM PHOSPHATE 10 MG/ML
10 INJECTION, SOLUTION INTRAMUSCULAR; INTRAVENOUS EVERY 24 HOURS
Status: DISCONTINUED | OUTPATIENT
Start: 2022-01-01 | End: 2022-01-01

## 2022-01-01 RX ORDER — CETIRIZINE HYDROCHLORIDE 10 MG/1
10 TABLET ORAL DAILY
Status: DISCONTINUED | OUTPATIENT
Start: 2022-01-01 | End: 2022-01-01 | Stop reason: HOSPADM

## 2022-01-01 RX ORDER — VITAMIN B COMPLEX
2000 TABLET ORAL DAILY
Status: DISCONTINUED | OUTPATIENT
Start: 2022-01-01 | End: 2022-01-01 | Stop reason: HOSPADM

## 2022-01-01 RX ORDER — MORPHINE SULFATE 4 MG/ML
4 INJECTION, SOLUTION INTRAMUSCULAR; INTRAVENOUS
Status: CANCELLED | OUTPATIENT
Start: 2022-01-01

## 2022-01-01 RX ORDER — HEPARIN SODIUM 5000 [USP'U]/ML
5000 INJECTION, SOLUTION INTRAVENOUS; SUBCUTANEOUS EVERY 8 HOURS SCHEDULED
Status: DISCONTINUED | OUTPATIENT
Start: 2022-01-01 | End: 2022-01-01

## 2022-01-01 RX ORDER — FINASTERIDE 5 MG/1
5 TABLET, FILM COATED ORAL DAILY
Status: CANCELLED | OUTPATIENT
Start: 2022-01-01

## 2022-01-01 RX ORDER — RIVASTIGMINE 4.6 MG/24H
1 PATCH, EXTENDED RELEASE TRANSDERMAL DAILY
Status: CANCELLED | OUTPATIENT
Start: 2022-01-01

## 2022-01-01 RX ORDER — GLYCOPYRROLATE 1 MG/5 ML
0.2 SYRINGE (ML) INTRAVENOUS EVERY 4 HOURS PRN
Status: DISCONTINUED | OUTPATIENT
Start: 2022-01-01 | End: 2022-01-31 | Stop reason: HOSPADM

## 2022-01-01 RX ORDER — AZITHROMYCIN 500 MG/1
500 INJECTION, POWDER, LYOPHILIZED, FOR SOLUTION INTRAVENOUS
Status: ON HOLD | COMMUNITY
End: 2022-01-01

## 2022-01-01 RX ORDER — BUDESONIDE 0.5 MG/2ML
1 INHALANT ORAL 2 TIMES DAILY
COMMUNITY

## 2022-01-01 RX ORDER — MAGNESIUM HYDROXIDE/ALUMINUM HYDROXICE/SIMETHICONE 120; 1200; 1200 MG/30ML; MG/30ML; MG/30ML
30 SUSPENSION ORAL EVERY 6 HOURS PRN
Status: DISCONTINUED | OUTPATIENT
Start: 2022-01-01 | End: 2022-01-01 | Stop reason: HOSPADM

## 2022-01-01 RX ORDER — SODIUM CHLORIDE 9 MG/ML
INJECTION, SOLUTION INTRAVENOUS CONTINUOUS
Status: DISCONTINUED | OUTPATIENT
Start: 2022-01-01 | End: 2022-01-01

## 2022-01-01 RX ORDER — ALBUTEROL SULFATE 2.5 MG/3ML
2.5 SOLUTION RESPIRATORY (INHALATION) EVERY 4 HOURS PRN
Status: DISCONTINUED | OUTPATIENT
Start: 2022-01-01 | End: 2022-01-31 | Stop reason: HOSPADM

## 2022-01-01 RX ORDER — FAMOTIDINE 40 MG/1
40 TABLET, FILM COATED ORAL DAILY
COMMUNITY

## 2022-01-01 RX ADMIN — HEPARIN SODIUM 5000 UNITS: 5000 INJECTION INTRAVENOUS; SUBCUTANEOUS at 21:31

## 2022-01-01 RX ADMIN — Medication 4 MG: at 04:17

## 2022-01-01 RX ADMIN — Medication 4 MG: at 23:58

## 2022-01-01 RX ADMIN — GABAPENTIN 100 MG: 100 CAPSULE ORAL at 08:19

## 2022-01-01 RX ADMIN — HEPARIN SODIUM 5000 UNITS: 5000 INJECTION INTRAVENOUS; SUBCUTANEOUS at 13:15

## 2022-01-01 RX ADMIN — SODIUM CHLORIDE, PRESERVATIVE FREE 10 ML: 5 INJECTION INTRAVENOUS at 09:10

## 2022-01-01 RX ADMIN — Medication 2000 UNITS: at 09:30

## 2022-01-01 RX ADMIN — FUROSEMIDE 40 MG: 10 INJECTION, SOLUTION INTRAMUSCULAR; INTRAVENOUS at 08:23

## 2022-01-01 RX ADMIN — HEPARIN SODIUM 5000 UNITS: 5000 INJECTION INTRAVENOUS; SUBCUTANEOUS at 05:52

## 2022-01-01 RX ADMIN — LORAZEPAM 2 MG: 2 INJECTION INTRAMUSCULAR; INTRAVENOUS at 16:10

## 2022-01-01 RX ADMIN — PIPERACILLIN AND TAZOBACTAM 4500 MG: 4; .5 INJECTION, POWDER, LYOPHILIZED, FOR SOLUTION INTRAVENOUS at 23:42

## 2022-01-01 RX ADMIN — FUROSEMIDE 40 MG: 10 INJECTION, SOLUTION INTRAMUSCULAR; INTRAVENOUS at 09:49

## 2022-01-01 RX ADMIN — IPRATROPIUM BROMIDE AND ALBUTEROL SULFATE 1 AMPULE: 2.5; .5 SOLUTION RESPIRATORY (INHALATION) at 15:02

## 2022-01-01 RX ADMIN — OXYCODONE HYDROCHLORIDE 60 MG: 15 TABLET, FILM COATED, EXTENDED RELEASE ORAL at 20:38

## 2022-01-01 RX ADMIN — MORPHINE SULFATE 4 MG: 4 INJECTION INTRAVENOUS at 16:47

## 2022-01-01 RX ADMIN — PIPERACILLIN AND TAZOBACTAM 4500 MG: 4; .5 INJECTION, POWDER, LYOPHILIZED, FOR SOLUTION INTRAVENOUS at 23:29

## 2022-01-01 RX ADMIN — LORAZEPAM 2 MG: 2 INJECTION INTRAMUSCULAR; INTRAVENOUS at 21:00

## 2022-01-01 RX ADMIN — MORPHINE SULFATE 2 MG: 2 INJECTION, SOLUTION INTRAMUSCULAR; INTRAVENOUS at 05:53

## 2022-01-01 RX ADMIN — LORAZEPAM 2 MG: 2 INJECTION INTRAMUSCULAR; INTRAVENOUS at 15:00

## 2022-01-01 RX ADMIN — IPRATROPIUM BROMIDE AND ALBUTEROL SULFATE 1 AMPULE: 2.5; .5 SOLUTION RESPIRATORY (INHALATION) at 19:02

## 2022-01-01 RX ADMIN — SODIUM CHLORIDE, PRESERVATIVE FREE 10 ML: 5 INJECTION INTRAVENOUS at 09:46

## 2022-01-01 RX ADMIN — METOPROLOL 25 MG: 25 TABLET ORAL at 20:39

## 2022-01-01 RX ADMIN — SODIUM CHLORIDE, PRESERVATIVE FREE 10 ML: 5 INJECTION INTRAVENOUS at 09:04

## 2022-01-01 RX ADMIN — IPRATROPIUM BROMIDE AND ALBUTEROL SULFATE 1 AMPULE: 2.5; .5 SOLUTION RESPIRATORY (INHALATION) at 14:53

## 2022-01-01 RX ADMIN — IPRATROPIUM BROMIDE AND ALBUTEROL SULFATE 1 AMPULE: 2.5; .5 SOLUTION RESPIRATORY (INHALATION) at 10:25

## 2022-01-01 RX ADMIN — METOPROLOL 25 MG: 25 TABLET ORAL at 08:25

## 2022-01-01 RX ADMIN — ALBUTEROL SULFATE 2.5 MG: 2.5 SOLUTION RESPIRATORY (INHALATION) at 07:19

## 2022-01-01 RX ADMIN — BUSPIRONE HYDROCHLORIDE 15 MG: 5 TABLET ORAL at 10:39

## 2022-01-01 RX ADMIN — SODIUM CHLORIDE, PRESERVATIVE FREE 10 ML: 5 INJECTION INTRAVENOUS at 08:12

## 2022-01-01 RX ADMIN — GABAPENTIN 100 MG: 100 CAPSULE ORAL at 08:30

## 2022-01-01 RX ADMIN — BUDESONIDE 500 MCG: 0.5 SUSPENSION RESPIRATORY (INHALATION) at 19:32

## 2022-01-01 RX ADMIN — QUETIAPINE FUMARATE 100 MG: 100 TABLET ORAL at 08:25

## 2022-01-01 RX ADMIN — FUROSEMIDE 40 MG: 10 INJECTION, SOLUTION INTRAMUSCULAR; INTRAVENOUS at 08:18

## 2022-01-01 RX ADMIN — IPRATROPIUM BROMIDE AND ALBUTEROL SULFATE 1 AMPULE: 2.5; .5 SOLUTION RESPIRATORY (INHALATION) at 07:16

## 2022-01-01 RX ADMIN — IPRATROPIUM BROMIDE AND ALBUTEROL SULFATE 1 AMPULE: 2.5; .5 SOLUTION RESPIRATORY (INHALATION) at 14:01

## 2022-01-01 RX ADMIN — HEPARIN SODIUM 5000 UNITS: 5000 INJECTION INTRAVENOUS; SUBCUTANEOUS at 22:53

## 2022-01-01 RX ADMIN — CETIRIZINE HYDROCHLORIDE 10 MG: 10 TABLET, FILM COATED ORAL at 10:30

## 2022-01-01 RX ADMIN — MORPHINE SULFATE 2 MG: 2 INJECTION, SOLUTION INTRAMUSCULAR; INTRAVENOUS at 21:11

## 2022-01-01 RX ADMIN — ACETAMINOPHEN 650 MG: 325 TABLET ORAL at 05:33

## 2022-01-01 RX ADMIN — LORAZEPAM 1 MG: 2 INJECTION INTRAMUSCULAR; INTRAVENOUS at 10:40

## 2022-01-01 RX ADMIN — ALBUTEROL SULFATE 2.5 MG: 2.5 SOLUTION RESPIRATORY (INHALATION) at 22:17

## 2022-01-01 RX ADMIN — Medication 2000 UNITS: at 08:37

## 2022-01-01 RX ADMIN — METOPROLOL 25 MG: 25 TABLET ORAL at 08:14

## 2022-01-01 RX ADMIN — CEFTRIAXONE SODIUM 1000 MG: 1 INJECTION, POWDER, FOR SOLUTION INTRAMUSCULAR; INTRAVENOUS at 21:19

## 2022-01-01 RX ADMIN — FINASTERIDE 5 MG: 5 TABLET, FILM COATED ORAL at 08:30

## 2022-01-01 RX ADMIN — LORAZEPAM 1 MG: 2 INJECTION INTRAMUSCULAR; INTRAVENOUS at 23:40

## 2022-01-01 RX ADMIN — FINASTERIDE 5 MG: 5 TABLET, FILM COATED ORAL at 09:10

## 2022-01-01 RX ADMIN — HYDROCODONE BITARTRATE AND ACETAMINOPHEN 2 TABLET: 5; 325 TABLET ORAL at 21:34

## 2022-01-01 RX ADMIN — LORAZEPAM 1 MG: 2 INJECTION INTRAMUSCULAR; INTRAVENOUS at 06:31

## 2022-01-01 RX ADMIN — FAMOTIDINE 40 MG: 20 TABLET, FILM COATED ORAL at 08:35

## 2022-01-01 RX ADMIN — SODIUM CHLORIDE, PRESERVATIVE FREE 10 ML: 5 INJECTION INTRAVENOUS at 09:36

## 2022-01-01 RX ADMIN — GABAPENTIN 100 MG: 100 CAPSULE ORAL at 19:49

## 2022-01-01 RX ADMIN — QUETIAPINE FUMARATE 100 MG: 100 TABLET ORAL at 22:58

## 2022-01-01 RX ADMIN — BUSPIRONE HYDROCHLORIDE 15 MG: 5 TABLET ORAL at 08:34

## 2022-01-01 RX ADMIN — CEFTRIAXONE SODIUM 1000 MG: 1 INJECTION, POWDER, FOR SOLUTION INTRAMUSCULAR; INTRAVENOUS at 20:07

## 2022-01-01 RX ADMIN — SODIUM CHLORIDE 25 ML: 9 INJECTION, SOLUTION INTRAVENOUS at 14:46

## 2022-01-01 RX ADMIN — IPRATROPIUM BROMIDE AND ALBUTEROL SULFATE 1 AMPULE: 2.5; .5 SOLUTION RESPIRATORY (INHALATION) at 12:06

## 2022-01-01 RX ADMIN — Medication 2000 UNITS: at 09:46

## 2022-01-01 RX ADMIN — GABAPENTIN 100 MG: 100 CAPSULE ORAL at 14:41

## 2022-01-01 RX ADMIN — OXYCODONE HYDROCHLORIDE 60 MG: 40 TABLET, FILM COATED, EXTENDED RELEASE ORAL at 09:58

## 2022-01-01 RX ADMIN — BUSPIRONE HYDROCHLORIDE 15 MG: 5 TABLET ORAL at 08:59

## 2022-01-01 RX ADMIN — SODIUM CHLORIDE, PRESERVATIVE FREE 10 ML: 5 INJECTION INTRAVENOUS at 08:34

## 2022-01-01 RX ADMIN — IPRATROPIUM BROMIDE AND ALBUTEROL SULFATE 1 AMPULE: 2.5; .5 SOLUTION RESPIRATORY (INHALATION) at 07:42

## 2022-01-01 RX ADMIN — Medication 2000 UNITS: at 10:30

## 2022-01-01 RX ADMIN — OXYCODONE HYDROCHLORIDE 60 MG: 15 TABLET, FILM COATED, EXTENDED RELEASE ORAL at 09:49

## 2022-01-01 RX ADMIN — PANTOPRAZOLE SODIUM 40 MG: 40 INJECTION, POWDER, FOR SOLUTION INTRAVENOUS at 08:24

## 2022-01-01 RX ADMIN — OXYCODONE HYDROCHLORIDE 60 MG: 15 TABLET, FILM COATED, EXTENDED RELEASE ORAL at 15:19

## 2022-01-01 RX ADMIN — CETIRIZINE HYDROCHLORIDE 10 MG: 10 TABLET, FILM COATED ORAL at 08:24

## 2022-01-01 RX ADMIN — PIPERACILLIN AND TAZOBACTAM 4500 MG: 4; .5 INJECTION, POWDER, LYOPHILIZED, FOR SOLUTION INTRAVENOUS; PARENTERAL at 14:49

## 2022-01-01 RX ADMIN — GABAPENTIN 100 MG: 100 CAPSULE ORAL at 20:34

## 2022-01-01 RX ADMIN — IPRATROPIUM BROMIDE AND ALBUTEROL SULFATE 1 AMPULE: 2.5; .5 SOLUTION RESPIRATORY (INHALATION) at 05:48

## 2022-01-01 RX ADMIN — METOPROLOL TARTRATE 5 MG: 5 INJECTION INTRAVENOUS at 22:59

## 2022-01-01 RX ADMIN — GUAIFENESIN SYRUP AND DEXTROMETHORPHAN 5 ML: 100; 10 SYRUP ORAL at 13:06

## 2022-01-01 RX ADMIN — FAMOTIDINE 20 MG: 20 TABLET, FILM COATED ORAL at 20:22

## 2022-01-01 RX ADMIN — SODIUM CHLORIDE, PRESERVATIVE FREE 10 ML: 5 INJECTION INTRAVENOUS at 08:25

## 2022-01-01 RX ADMIN — MORPHINE SULFATE 4 MG: 4 INJECTION INTRAVENOUS at 07:20

## 2022-01-01 RX ADMIN — QUETIAPINE FUMARATE 100 MG: 25 TABLET ORAL at 09:57

## 2022-01-01 RX ADMIN — SODIUM CHLORIDE, PRESERVATIVE FREE 10 ML: 5 INJECTION INTRAVENOUS at 21:13

## 2022-01-01 RX ADMIN — IPRATROPIUM BROMIDE AND ALBUTEROL SULFATE 1 AMPULE: 2.5; .5 SOLUTION RESPIRATORY (INHALATION) at 19:24

## 2022-01-01 RX ADMIN — ENOXAPARIN SODIUM 30 MG: 100 INJECTION SUBCUTANEOUS at 09:00

## 2022-01-01 RX ADMIN — GABAPENTIN 100 MG: 100 CAPSULE ORAL at 08:37

## 2022-01-01 RX ADMIN — PANTOPRAZOLE SODIUM 40 MG: 40 INJECTION, POWDER, FOR SOLUTION INTRAVENOUS at 09:31

## 2022-01-01 RX ADMIN — FAMOTIDINE 20 MG: 20 TABLET, FILM COATED ORAL at 09:04

## 2022-01-01 RX ADMIN — DEXMEDETOMIDINE HYDROCHLORIDE 0.2 MCG/KG/HR: 100 INJECTION, SOLUTION INTRAVENOUS at 22:12

## 2022-01-01 RX ADMIN — ENOXAPARIN SODIUM 30 MG: 100 INJECTION SUBCUTANEOUS at 08:00

## 2022-01-01 RX ADMIN — METOPROLOL 25 MG: 25 TABLET ORAL at 08:59

## 2022-01-01 RX ADMIN — DEXAMETHASONE 6 MG: 4 TABLET ORAL at 17:46

## 2022-01-01 RX ADMIN — ALBUTEROL SULFATE 2.5 MG: 2.5 SOLUTION RESPIRATORY (INHALATION) at 12:07

## 2022-01-01 RX ADMIN — QUETIAPINE FUMARATE 50 MG: 25 TABLET ORAL at 21:07

## 2022-01-01 RX ADMIN — IPRATROPIUM BROMIDE AND ALBUTEROL SULFATE 1 AMPULE: 2.5; .5 SOLUTION RESPIRATORY (INHALATION) at 06:25

## 2022-01-01 RX ADMIN — IPRATROPIUM BROMIDE AND ALBUTEROL SULFATE 1 AMPULE: 2.5; .5 SOLUTION RESPIRATORY (INHALATION) at 14:03

## 2022-01-01 RX ADMIN — FINASTERIDE 5 MG: 5 TABLET, FILM COATED ORAL at 10:40

## 2022-01-01 RX ADMIN — BUSPIRONE HYDROCHLORIDE 15 MG: 10 TABLET ORAL at 07:51

## 2022-01-01 RX ADMIN — HEPARIN SODIUM 5000 UNITS: 5000 INJECTION INTRAVENOUS; SUBCUTANEOUS at 05:20

## 2022-01-01 RX ADMIN — ACETAMINOPHEN 650 MG: 325 TABLET ORAL at 01:24

## 2022-01-01 RX ADMIN — DULOXETINE HYDROCHLORIDE 60 MG: 60 CAPSULE, DELAYED RELEASE ORAL at 08:38

## 2022-01-01 RX ADMIN — OXYCODONE HYDROCHLORIDE 60 MG: 40 TABLET, FILM COATED, EXTENDED RELEASE ORAL at 22:35

## 2022-01-01 RX ADMIN — OXYCODONE HYDROCHLORIDE 60 MG: 15 TABLET, FILM COATED, EXTENDED RELEASE ORAL at 19:58

## 2022-01-01 RX ADMIN — MORPHINE SULFATE 4 MG: 4 INJECTION INTRAVENOUS at 19:36

## 2022-01-01 RX ADMIN — PANTOPRAZOLE SODIUM 40 MG: 40 INJECTION, POWDER, FOR SOLUTION INTRAVENOUS at 09:04

## 2022-01-01 RX ADMIN — GUAIFENESIN SYRUP AND DEXTROMETHORPHAN 5 ML: 100; 10 SYRUP ORAL at 05:18

## 2022-01-01 RX ADMIN — DULOXETINE HYDROCHLORIDE 60 MG: 60 CAPSULE, DELAYED RELEASE ORAL at 09:57

## 2022-01-01 RX ADMIN — SODIUM CHLORIDE: 9 INJECTION, SOLUTION INTRAVENOUS at 17:38

## 2022-01-01 RX ADMIN — FAMOTIDINE 20 MG: 20 TABLET, FILM COATED ORAL at 07:52

## 2022-01-01 RX ADMIN — Medication 2000 UNITS: at 08:34

## 2022-01-01 RX ADMIN — FINASTERIDE 5 MG: 5 TABLET, FILM COATED ORAL at 08:38

## 2022-01-01 RX ADMIN — CETIRIZINE HYDROCHLORIDE 10 MG: 10 TABLET, FILM COATED ORAL at 07:51

## 2022-01-01 RX ADMIN — ALBUTEROL SULFATE 2.5 MG: 2.5 SOLUTION RESPIRATORY (INHALATION) at 07:53

## 2022-01-01 RX ADMIN — QUETIAPINE FUMARATE 100 MG: 100 TABLET ORAL at 08:29

## 2022-01-01 RX ADMIN — DULOXETINE HYDROCHLORIDE 60 MG: 60 CAPSULE, DELAYED RELEASE ORAL at 08:18

## 2022-01-01 RX ADMIN — PIPERACILLIN AND TAZOBACTAM 4500 MG: 4; .5 INJECTION, POWDER, LYOPHILIZED, FOR SOLUTION INTRAVENOUS at 14:41

## 2022-01-01 RX ADMIN — FAMOTIDINE 20 MG: 20 TABLET, FILM COATED ORAL at 09:05

## 2022-01-01 RX ADMIN — ENOXAPARIN SODIUM 30 MG: 100 INJECTION SUBCUTANEOUS at 19:59

## 2022-01-01 RX ADMIN — CETIRIZINE HYDROCHLORIDE 10 MG: 10 TABLET, FILM COATED ORAL at 08:59

## 2022-01-01 RX ADMIN — HEPARIN SODIUM 5000 UNITS: 5000 INJECTION INTRAVENOUS; SUBCUTANEOUS at 05:11

## 2022-01-01 RX ADMIN — FAMOTIDINE 20 MG: 20 TABLET, FILM COATED ORAL at 08:13

## 2022-01-01 RX ADMIN — GABAPENTIN 100 MG: 100 CAPSULE ORAL at 19:58

## 2022-01-01 RX ADMIN — SODIUM CHLORIDE, PRESERVATIVE FREE 10 ML: 5 INJECTION INTRAVENOUS at 21:15

## 2022-01-01 RX ADMIN — QUETIAPINE FUMARATE 25 MG: 25 TABLET ORAL at 21:32

## 2022-01-01 RX ADMIN — FINASTERIDE 5 MG: 5 TABLET, FILM COATED ORAL at 08:49

## 2022-01-01 RX ADMIN — FINASTERIDE 5 MG: 5 TABLET, FILM COATED ORAL at 08:24

## 2022-01-01 RX ADMIN — FINASTERIDE 5 MG: 5 TABLET, FILM COATED ORAL at 07:51

## 2022-01-01 RX ADMIN — GUAIFENESIN SYRUP AND DEXTROMETHORPHAN 5 ML: 100; 10 SYRUP ORAL at 09:00

## 2022-01-01 RX ADMIN — FAMOTIDINE 20 MG: 20 TABLET, FILM COATED ORAL at 08:29

## 2022-01-01 RX ADMIN — GABAPENTIN 100 MG: 100 CAPSULE ORAL at 14:12

## 2022-01-01 RX ADMIN — ACETAMINOPHEN 650 MG: 325 TABLET ORAL at 21:01

## 2022-01-01 RX ADMIN — BUSPIRONE HYDROCHLORIDE 15 MG: 5 TABLET ORAL at 08:24

## 2022-01-01 RX ADMIN — Medication 2000 UNITS: at 08:24

## 2022-01-01 RX ADMIN — IPRATROPIUM BROMIDE AND ALBUTEROL SULFATE 1 AMPULE: 2.5; .5 SOLUTION RESPIRATORY (INHALATION) at 06:14

## 2022-01-01 RX ADMIN — LORAZEPAM 2 MG: 2 INJECTION INTRAMUSCULAR; INTRAVENOUS at 13:20

## 2022-01-01 RX ADMIN — HYDROMORPHONE HYDROCHLORIDE 1 MG: 1 INJECTION, SOLUTION INTRAMUSCULAR; INTRAVENOUS; SUBCUTANEOUS at 17:56

## 2022-01-01 RX ADMIN — FINASTERIDE 5 MG: 5 TABLET, FILM COATED ORAL at 09:47

## 2022-01-01 RX ADMIN — LORAZEPAM 2 MG: 2 INJECTION INTRAMUSCULAR; INTRAVENOUS at 02:34

## 2022-01-01 RX ADMIN — ALBUTEROL SULFATE 2.5 MG: 2.5 SOLUTION RESPIRATORY (INHALATION) at 11:39

## 2022-01-01 RX ADMIN — ACETAMINOPHEN 650 MG: 325 TABLET ORAL at 04:58

## 2022-01-01 RX ADMIN — GABAPENTIN 100 MG: 100 CAPSULE ORAL at 19:59

## 2022-01-01 RX ADMIN — METOPROLOL 25 MG: 25 TABLET ORAL at 12:00

## 2022-01-01 RX ADMIN — ONDANSETRON 4 MG: 2 INJECTION INTRAMUSCULAR; INTRAVENOUS at 14:46

## 2022-01-01 RX ADMIN — BUDESONIDE 500 MCG: 0.5 SUSPENSION RESPIRATORY (INHALATION) at 17:18

## 2022-01-01 RX ADMIN — SODIUM CHLORIDE, PRESERVATIVE FREE 10 ML: 5 INJECTION INTRAVENOUS at 08:18

## 2022-01-01 RX ADMIN — BUSPIRONE HYDROCHLORIDE 15 MG: 5 TABLET ORAL at 08:13

## 2022-01-01 RX ADMIN — HYDROCODONE BITARTRATE AND ACETAMINOPHEN 1 TABLET: 10; 325 TABLET ORAL at 11:16

## 2022-01-01 RX ADMIN — BUDESONIDE 500 MCG: 0.5 SUSPENSION RESPIRATORY (INHALATION) at 07:53

## 2022-01-01 RX ADMIN — QUETIAPINE FUMARATE 50 MG: 25 TABLET ORAL at 21:10

## 2022-01-01 RX ADMIN — FAMOTIDINE 20 MG: 20 TABLET, FILM COATED ORAL at 22:58

## 2022-01-01 RX ADMIN — METOPROLOL 25 MG: 25 TABLET ORAL at 10:39

## 2022-01-01 RX ADMIN — MORPHINE SULFATE 4 MG: 4 INJECTION INTRAVENOUS at 16:07

## 2022-01-01 RX ADMIN — DULOXETINE HYDROCHLORIDE 60 MG: 60 CAPSULE, DELAYED RELEASE ORAL at 08:24

## 2022-01-01 RX ADMIN — MIDAZOLAM 4 MG: 1 INJECTION INTRAMUSCULAR; INTRAVENOUS at 01:41

## 2022-01-01 RX ADMIN — HEPARIN SODIUM 5000 UNITS: 5000 INJECTION INTRAVENOUS; SUBCUTANEOUS at 05:30

## 2022-01-01 RX ADMIN — ALBUTEROL SULFATE 2.5 MG: 2.5 SOLUTION RESPIRATORY (INHALATION) at 09:57

## 2022-01-01 RX ADMIN — BUDESONIDE 500 MCG: 0.5 SUSPENSION RESPIRATORY (INHALATION) at 19:04

## 2022-01-01 RX ADMIN — ZIPRASIDONE MESYLATE 20 MG: 20 INJECTION, POWDER, LYOPHILIZED, FOR SOLUTION INTRAMUSCULAR at 02:36

## 2022-01-01 RX ADMIN — SODIUM CHLORIDE, PRESERVATIVE FREE 10 ML: 5 INJECTION INTRAVENOUS at 10:40

## 2022-01-01 RX ADMIN — FINASTERIDE 5 MG: 5 TABLET, FILM COATED ORAL at 09:57

## 2022-01-01 RX ADMIN — IPRATROPIUM BROMIDE AND ALBUTEROL SULFATE 1 AMPULE: 2.5; .5 SOLUTION RESPIRATORY (INHALATION) at 17:19

## 2022-01-01 RX ADMIN — GABAPENTIN 100 MG: 100 CAPSULE ORAL at 21:39

## 2022-01-01 RX ADMIN — HYDROCODONE BITARTRATE AND ACETAMINOPHEN 1 TABLET: 10; 325 TABLET ORAL at 17:26

## 2022-01-01 RX ADMIN — QUETIAPINE FUMARATE 75 MG: 25 TABLET ORAL at 08:24

## 2022-01-01 RX ADMIN — GABAPENTIN 100 MG: 100 CAPSULE ORAL at 15:15

## 2022-01-01 RX ADMIN — DULOXETINE HYDROCHLORIDE 60 MG: 60 CAPSULE, DELAYED RELEASE ORAL at 08:14

## 2022-01-01 RX ADMIN — IPRATROPIUM BROMIDE AND ALBUTEROL SULFATE 1 AMPULE: 2.5; .5 SOLUTION RESPIRATORY (INHALATION) at 15:04

## 2022-01-01 RX ADMIN — ENOXAPARIN SODIUM 30 MG: 100 INJECTION SUBCUTANEOUS at 08:39

## 2022-01-01 RX ADMIN — METOPROLOL 25 MG: 25 TABLET ORAL at 08:18

## 2022-01-01 RX ADMIN — DEXAMETHASONE SODIUM PHOSPHATE 10 MG: 10 INJECTION INTRAMUSCULAR; INTRAVENOUS at 19:32

## 2022-01-01 RX ADMIN — PIPERACILLIN AND TAZOBACTAM 4500 MG: 4; .5 INJECTION, POWDER, LYOPHILIZED, FOR SOLUTION INTRAVENOUS at 05:55

## 2022-01-01 RX ADMIN — SODIUM CHLORIDE, PRESERVATIVE FREE 10 ML: 5 INJECTION INTRAVENOUS at 09:31

## 2022-01-01 RX ADMIN — DEXAMETHASONE SODIUM PHOSPHATE 6 MG: 10 INJECTION, SOLUTION INTRAMUSCULAR; INTRAVENOUS at 21:05

## 2022-01-01 RX ADMIN — BUSPIRONE HYDROCHLORIDE 15 MG: 5 TABLET ORAL at 09:47

## 2022-01-01 RX ADMIN — OXYCODONE HYDROCHLORIDE 60 MG: 15 TABLET, FILM COATED, EXTENDED RELEASE ORAL at 08:59

## 2022-01-01 RX ADMIN — DULOXETINE HYDROCHLORIDE 60 MG: 60 CAPSULE, DELAYED RELEASE ORAL at 10:40

## 2022-01-01 RX ADMIN — IPRATROPIUM BROMIDE AND ALBUTEROL SULFATE 1 AMPULE: 2.5; .5 SOLUTION RESPIRATORY (INHALATION) at 15:41

## 2022-01-01 RX ADMIN — Medication 2 MG: at 13:18

## 2022-01-01 RX ADMIN — QUETIAPINE FUMARATE 100 MG: 100 TABLET ORAL at 10:39

## 2022-01-01 RX ADMIN — BUDESONIDE 500 MCG: 0.5 SUSPENSION RESPIRATORY (INHALATION) at 07:20

## 2022-01-01 RX ADMIN — SODIUM CHLORIDE, PRESERVATIVE FREE 10 ML: 5 INJECTION INTRAVENOUS at 13:07

## 2022-01-01 RX ADMIN — GABAPENTIN 100 MG: 100 CAPSULE ORAL at 09:30

## 2022-01-01 RX ADMIN — METOPROLOL 25 MG: 25 TABLET ORAL at 20:34

## 2022-01-01 RX ADMIN — DEXAMETHASONE 6 MG: 4 TABLET ORAL at 10:29

## 2022-01-01 RX ADMIN — ZIPRASIDONE MESYLATE 20 MG: 20 INJECTION, POWDER, LYOPHILIZED, FOR SOLUTION INTRAMUSCULAR at 08:29

## 2022-01-01 RX ADMIN — FAMOTIDINE 40 MG: 20 TABLET, FILM COATED ORAL at 09:47

## 2022-01-01 RX ADMIN — FUROSEMIDE 20 MG: 10 INJECTION, SOLUTION INTRAMUSCULAR; INTRAVENOUS at 08:34

## 2022-01-01 RX ADMIN — HYDROCODONE BITARTRATE AND ACETAMINOPHEN 2 TABLET: 5; 325 TABLET ORAL at 12:51

## 2022-01-01 RX ADMIN — DEXAMETHASONE SODIUM PHOSPHATE 10 MG: 10 INJECTION INTRAMUSCULAR; INTRAVENOUS at 20:00

## 2022-01-01 RX ADMIN — DEXAMETHASONE SODIUM PHOSPHATE 10 MG: 10 INJECTION INTRAMUSCULAR; INTRAVENOUS at 21:11

## 2022-01-01 RX ADMIN — METOPROLOL 25 MG: 25 TABLET ORAL at 19:53

## 2022-01-01 RX ADMIN — FAMOTIDINE 20 MG: 20 TABLET, FILM COATED ORAL at 09:48

## 2022-01-01 RX ADMIN — SODIUM CHLORIDE, PRESERVATIVE FREE 10 ML: 5 INJECTION INTRAVENOUS at 22:58

## 2022-01-01 RX ADMIN — FUROSEMIDE 20 MG: 10 INJECTION, SOLUTION INTRAMUSCULAR; INTRAVENOUS at 15:29

## 2022-01-01 RX ADMIN — GUAIFENESIN SYRUP AND DEXTROMETHORPHAN 5 ML: 100; 10 SYRUP ORAL at 00:07

## 2022-01-01 RX ADMIN — LORAZEPAM 1 MG: 2 INJECTION INTRAMUSCULAR; INTRAVENOUS at 14:46

## 2022-01-01 RX ADMIN — IPRATROPIUM BROMIDE AND ALBUTEROL SULFATE 1 AMPULE: 2.5; .5 SOLUTION RESPIRATORY (INHALATION) at 10:08

## 2022-01-01 RX ADMIN — CETIRIZINE HYDROCHLORIDE 10 MG: 10 TABLET, FILM COATED ORAL at 09:03

## 2022-01-01 RX ADMIN — IPRATROPIUM BROMIDE AND ALBUTEROL SULFATE 1 AMPULE: 2.5; .5 SOLUTION RESPIRATORY (INHALATION) at 06:13

## 2022-01-01 RX ADMIN — QUETIAPINE FUMARATE 100 MG: 25 TABLET ORAL at 07:52

## 2022-01-01 RX ADMIN — ENOXAPARIN SODIUM 30 MG: 100 INJECTION SUBCUTANEOUS at 20:47

## 2022-01-01 RX ADMIN — ACETAMINOPHEN 650 MG: 325 TABLET ORAL at 19:26

## 2022-01-01 RX ADMIN — ENOXAPARIN SODIUM 30 MG: 100 INJECTION SUBCUTANEOUS at 12:45

## 2022-01-01 RX ADMIN — OXYCODONE HYDROCHLORIDE 60 MG: 15 TABLET, FILM COATED, EXTENDED RELEASE ORAL at 14:00

## 2022-01-01 RX ADMIN — PIPERACILLIN AND TAZOBACTAM 4500 MG: 4; .5 INJECTION, POWDER, LYOPHILIZED, FOR SOLUTION INTRAVENOUS at 06:12

## 2022-01-01 RX ADMIN — DULOXETINE HYDROCHLORIDE 60 MG: 60 CAPSULE, DELAYED RELEASE ORAL at 08:49

## 2022-01-01 RX ADMIN — DULOXETINE HYDROCHLORIDE 60 MG: 60 CAPSULE, DELAYED RELEASE ORAL at 08:28

## 2022-01-01 RX ADMIN — QUETIAPINE FUMARATE 25 MG: 25 TABLET ORAL at 20:00

## 2022-01-01 RX ADMIN — PANTOPRAZOLE SODIUM 40 MG: 40 INJECTION, POWDER, FOR SOLUTION INTRAVENOUS at 13:01

## 2022-01-01 RX ADMIN — SODIUM CHLORIDE, PRESERVATIVE FREE 10 ML: 5 INJECTION INTRAVENOUS at 20:41

## 2022-01-01 RX ADMIN — FINASTERIDE 5 MG: 5 TABLET, FILM COATED ORAL at 09:05

## 2022-01-01 RX ADMIN — GABAPENTIN 100 MG: 100 CAPSULE ORAL at 14:19

## 2022-01-01 RX ADMIN — ALBUTEROL SULFATE 2.5 MG: 2.5 SOLUTION RESPIRATORY (INHALATION) at 20:23

## 2022-01-01 RX ADMIN — LORAZEPAM 2 MG: 2 INJECTION INTRAMUSCULAR; INTRAVENOUS at 03:13

## 2022-01-01 RX ADMIN — QUETIAPINE FUMARATE 100 MG: 100 TABLET ORAL at 20:34

## 2022-01-01 RX ADMIN — BUDESONIDE 500 MCG: 0.5 SUSPENSION RESPIRATORY (INHALATION) at 20:15

## 2022-01-01 RX ADMIN — LORAZEPAM 2 MG: 2 INJECTION INTRAMUSCULAR; INTRAVENOUS at 21:38

## 2022-01-01 RX ADMIN — Medication 0.2 MG: at 17:00

## 2022-01-01 RX ADMIN — MORPHINE SULFATE 4 MG: 4 INJECTION INTRAVENOUS at 05:53

## 2022-01-01 RX ADMIN — HYDROMORPHONE HYDROCHLORIDE 1 MG: 1 INJECTION, SOLUTION INTRAMUSCULAR; INTRAVENOUS; SUBCUTANEOUS at 19:17

## 2022-01-01 RX ADMIN — MORPHINE SULFATE 4 MG: 4 INJECTION INTRAVENOUS at 11:17

## 2022-01-01 RX ADMIN — GABAPENTIN 100 MG: 100 CAPSULE ORAL at 20:36

## 2022-01-01 RX ADMIN — IPRATROPIUM BROMIDE AND ALBUTEROL SULFATE 1 AMPULE: 2.5; .5 SOLUTION RESPIRATORY (INHALATION) at 10:58

## 2022-01-01 RX ADMIN — ALBUTEROL SULFATE 2.5 MG: 2.5 SOLUTION RESPIRATORY (INHALATION) at 18:01

## 2022-01-01 RX ADMIN — METOPROLOL 25 MG: 25 TABLET ORAL at 19:57

## 2022-01-01 RX ADMIN — BUDESONIDE 500 MCG: 0.5 SUSPENSION RESPIRATORY (INHALATION) at 19:25

## 2022-01-01 RX ADMIN — IPRATROPIUM BROMIDE AND ALBUTEROL SULFATE 1 AMPULE: 2.5; .5 SOLUTION RESPIRATORY (INHALATION) at 10:15

## 2022-01-01 RX ADMIN — POTASSIUM CHLORIDE 40 MEQ: 1500 TABLET, EXTENDED RELEASE ORAL at 14:58

## 2022-01-01 RX ADMIN — CETIRIZINE HYDROCHLORIDE 10 MG: 10 TABLET, FILM COATED ORAL at 08:13

## 2022-01-01 RX ADMIN — OXYCODONE HYDROCHLORIDE 60 MG: 15 TABLET, FILM COATED, EXTENDED RELEASE ORAL at 08:23

## 2022-01-01 RX ADMIN — ZIPRASIDONE MESYLATE 20 MG: 20 INJECTION, POWDER, LYOPHILIZED, FOR SOLUTION INTRAMUSCULAR at 18:01

## 2022-01-01 RX ADMIN — SODIUM CHLORIDE, PRESERVATIVE FREE 10 ML: 5 INJECTION INTRAVENOUS at 10:31

## 2022-01-01 RX ADMIN — GUAIFENESIN SYRUP AND DEXTROMETHORPHAN 5 ML: 100; 10 SYRUP ORAL at 08:23

## 2022-01-01 RX ADMIN — ACETAMINOPHEN 650 MG: 325 TABLET ORAL at 20:39

## 2022-01-01 RX ADMIN — SODIUM CHLORIDE, PRESERVATIVE FREE 10 ML: 5 INJECTION INTRAVENOUS at 08:31

## 2022-01-01 RX ADMIN — OXYCODONE HYDROCHLORIDE 60 MG: 15 TABLET, FILM COATED, EXTENDED RELEASE ORAL at 20:34

## 2022-01-01 RX ADMIN — OXYCODONE HYDROCHLORIDE 60 MG: 40 TABLET, FILM COATED, EXTENDED RELEASE ORAL at 14:12

## 2022-01-01 RX ADMIN — PANTOPRAZOLE SODIUM 40 MG: 40 INJECTION, POWDER, FOR SOLUTION INTRAVENOUS at 08:38

## 2022-01-01 RX ADMIN — OXYCODONE HYDROCHLORIDE 60 MG: 15 TABLET, FILM COATED, EXTENDED RELEASE ORAL at 08:18

## 2022-01-01 RX ADMIN — FUROSEMIDE 20 MG: 10 INJECTION, SOLUTION INTRAMUSCULAR; INTRAVENOUS at 08:25

## 2022-01-01 RX ADMIN — FINASTERIDE 5 MG: 5 TABLET, FILM COATED ORAL at 08:19

## 2022-01-01 RX ADMIN — QUETIAPINE FUMARATE 50 MG: 25 TABLET ORAL at 20:20

## 2022-01-01 RX ADMIN — ENOXAPARIN SODIUM 30 MG: 100 INJECTION SUBCUTANEOUS at 20:40

## 2022-01-01 RX ADMIN — METOPROLOL TARTRATE 25 MG: 25 TABLET, FILM COATED ORAL at 09:58

## 2022-01-01 RX ADMIN — HALOPERIDOL LACTATE 5 MG: 5 INJECTION, SOLUTION INTRAMUSCULAR at 05:49

## 2022-01-01 RX ADMIN — ACETAMINOPHEN 650 MG: 325 TABLET ORAL at 09:46

## 2022-01-01 RX ADMIN — DEXAMETHASONE SODIUM PHOSPHATE 10 MG: 10 INJECTION, SOLUTION INTRAMUSCULAR; INTRAVENOUS at 20:52

## 2022-01-01 RX ADMIN — QUETIAPINE FUMARATE 100 MG: 25 TABLET ORAL at 00:07

## 2022-01-01 RX ADMIN — BUDESONIDE 500 MCG: 0.5 SUSPENSION RESPIRATORY (INHALATION) at 06:05

## 2022-01-01 RX ADMIN — IPRATROPIUM BROMIDE AND ALBUTEROL SULFATE 1 AMPULE: 2.5; .5 SOLUTION RESPIRATORY (INHALATION) at 20:11

## 2022-01-01 RX ADMIN — OXYCODONE HYDROCHLORIDE 60 MG: 15 TABLET, FILM COATED, EXTENDED RELEASE ORAL at 09:03

## 2022-01-01 RX ADMIN — QUETIAPINE FUMARATE 50 MG: 25 TABLET ORAL at 08:49

## 2022-01-01 RX ADMIN — POTASSIUM CHLORIDE 40 MEQ: 1500 TABLET, EXTENDED RELEASE ORAL at 08:49

## 2022-01-01 RX ADMIN — Medication 4 MG: at 15:45

## 2022-01-01 RX ADMIN — QUETIAPINE FUMARATE 50 MG: 25 TABLET ORAL at 21:02

## 2022-01-01 RX ADMIN — BUDESONIDE 500 MCG: 0.5 SUSPENSION RESPIRATORY (INHALATION) at 20:11

## 2022-01-01 RX ADMIN — GABAPENTIN 100 MG: 100 CAPSULE ORAL at 13:10

## 2022-01-01 RX ADMIN — ACETAMINOPHEN 650 MG: 325 TABLET ORAL at 16:34

## 2022-01-01 RX ADMIN — BUDESONIDE 500 MCG: 0.5 SUSPENSION RESPIRATORY (INHALATION) at 07:16

## 2022-01-01 RX ADMIN — QUETIAPINE FUMARATE 25 MG: 25 TABLET ORAL at 09:31

## 2022-01-01 RX ADMIN — DULOXETINE HYDROCHLORIDE 60 MG: 60 CAPSULE, DELAYED RELEASE ORAL at 08:35

## 2022-01-01 RX ADMIN — BUSPIRONE HYDROCHLORIDE 15 MG: 5 TABLET ORAL at 10:30

## 2022-01-01 RX ADMIN — METOPROLOL TARTRATE 5 MG: 5 INJECTION INTRAVENOUS at 17:16

## 2022-01-01 RX ADMIN — OXYCODONE HYDROCHLORIDE 60 MG: 15 TABLET, FILM COATED, EXTENDED RELEASE ORAL at 10:40

## 2022-01-01 RX ADMIN — QUETIAPINE FUMARATE 100 MG: 100 TABLET ORAL at 09:06

## 2022-01-01 RX ADMIN — BUDESONIDE 500 MCG: 0.5 SUSPENSION RESPIRATORY (INHALATION) at 09:20

## 2022-01-01 RX ADMIN — PIPERACILLIN AND TAZOBACTAM 4500 MG: 4; .5 INJECTION, POWDER, LYOPHILIZED, FOR SOLUTION INTRAVENOUS at 00:02

## 2022-01-01 RX ADMIN — CETIRIZINE HYDROCHLORIDE 10 MG: 10 TABLET, FILM COATED ORAL at 08:19

## 2022-01-01 RX ADMIN — GABAPENTIN 100 MG: 100 CAPSULE ORAL at 20:40

## 2022-01-01 RX ADMIN — SODIUM CHLORIDE, PRESERVATIVE FREE 10 ML: 5 INJECTION INTRAVENOUS at 08:24

## 2022-01-01 RX ADMIN — IPRATROPIUM BROMIDE AND ALBUTEROL SULFATE 1 AMPULE: 2.5; .5 SOLUTION RESPIRATORY (INHALATION) at 11:10

## 2022-01-01 RX ADMIN — SODIUM CHLORIDE 1000 ML: 9 INJECTION, SOLUTION INTRAVENOUS at 19:56

## 2022-01-01 RX ADMIN — LORAZEPAM 2 MG: 2 INJECTION INTRAMUSCULAR; INTRAVENOUS at 05:58

## 2022-01-01 RX ADMIN — ENOXAPARIN SODIUM 30 MG: 100 INJECTION SUBCUTANEOUS at 19:48

## 2022-01-01 RX ADMIN — BUSPIRONE HYDROCHLORIDE 15 MG: 5 TABLET ORAL at 09:48

## 2022-01-01 RX ADMIN — LORAZEPAM 2 MG: 2 INJECTION INTRAMUSCULAR; INTRAVENOUS at 09:16

## 2022-01-01 RX ADMIN — PIPERACILLIN AND TAZOBACTAM 4500 MG: 4; .5 INJECTION, POWDER, LYOPHILIZED, FOR SOLUTION INTRAVENOUS at 06:33

## 2022-01-01 RX ADMIN — FAMOTIDINE 20 MG: 20 TABLET, FILM COATED ORAL at 08:19

## 2022-01-01 RX ADMIN — ALBUTEROL SULFATE 2.5 MG: 2.5 SOLUTION RESPIRATORY (INHALATION) at 20:41

## 2022-01-01 RX ADMIN — BUDESONIDE 500 MCG: 0.5 SUSPENSION RESPIRATORY (INHALATION) at 05:36

## 2022-01-01 RX ADMIN — OXYCODONE HYDROCHLORIDE 60 MG: 15 TABLET, FILM COATED, EXTENDED RELEASE ORAL at 20:40

## 2022-01-01 RX ADMIN — SODIUM CHLORIDE, PRESERVATIVE FREE 10 ML: 5 INJECTION INTRAVENOUS at 09:05

## 2022-01-01 RX ADMIN — GABAPENTIN 100 MG: 100 CAPSULE ORAL at 10:40

## 2022-01-01 RX ADMIN — SODIUM CHLORIDE, PRESERVATIVE FREE 10 ML: 5 INJECTION INTRAVENOUS at 22:35

## 2022-01-01 RX ADMIN — GABAPENTIN 100 MG: 100 CAPSULE ORAL at 08:28

## 2022-01-01 RX ADMIN — ACETAMINOPHEN 650 MG: 325 TABLET ORAL at 16:24

## 2022-01-01 RX ADMIN — QUETIAPINE FUMARATE 50 MG: 25 TABLET ORAL at 14:46

## 2022-01-01 RX ADMIN — BUDESONIDE 500 MCG: 0.5 SUSPENSION RESPIRATORY (INHALATION) at 07:54

## 2022-01-01 RX ADMIN — CETIRIZINE HYDROCHLORIDE 10 MG: 10 TABLET, FILM COATED ORAL at 08:37

## 2022-01-01 RX ADMIN — LORAZEPAM 1 MG: 2 INJECTION INTRAMUSCULAR; INTRAVENOUS at 09:13

## 2022-01-01 RX ADMIN — PIPERACILLIN AND TAZOBACTAM 4500 MG: 4; .5 INJECTION, POWDER, LYOPHILIZED, FOR SOLUTION INTRAVENOUS at 23:36

## 2022-01-01 RX ADMIN — ALBUTEROL SULFATE 2.5 MG: 2.5 SOLUTION RESPIRATORY (INHALATION) at 22:51

## 2022-01-01 RX ADMIN — HEPARIN SODIUM 5000 UNITS: 5000 INJECTION INTRAVENOUS; SUBCUTANEOUS at 13:07

## 2022-01-01 RX ADMIN — ACETAMINOPHEN 650 MG: 325 TABLET ORAL at 08:27

## 2022-01-01 RX ADMIN — BUDESONIDE 500 MCG: 0.5 SUSPENSION RESPIRATORY (INHALATION) at 19:54

## 2022-01-01 RX ADMIN — DEXAMETHASONE SODIUM PHOSPHATE 6 MG: 10 INJECTION, SOLUTION INTRAMUSCULAR; INTRAVENOUS at 21:01

## 2022-01-01 RX ADMIN — BUSPIRONE HYDROCHLORIDE 15 MG: 5 TABLET ORAL at 08:38

## 2022-01-01 RX ADMIN — DULOXETINE HYDROCHLORIDE 60 MG: 60 CAPSULE, DELAYED RELEASE ORAL at 09:10

## 2022-01-01 RX ADMIN — ENOXAPARIN SODIUM 30 MG: 100 INJECTION SUBCUTANEOUS at 20:34

## 2022-01-01 RX ADMIN — LORAZEPAM 1 MG: 2 INJECTION INTRAMUSCULAR; INTRAVENOUS at 20:16

## 2022-01-01 RX ADMIN — ALBUTEROL SULFATE 2.5 MG: 2.5 SOLUTION RESPIRATORY (INHALATION) at 07:59

## 2022-01-01 RX ADMIN — POTASSIUM CHLORIDE 40 MEQ: 20 TABLET, EXTENDED RELEASE ORAL at 05:33

## 2022-01-01 RX ADMIN — GABAPENTIN 100 MG: 100 CAPSULE ORAL at 15:19

## 2022-01-01 RX ADMIN — POTASSIUM CHLORIDE 40 MEQ: 20 SOLUTION ORAL at 06:37

## 2022-01-01 RX ADMIN — BUSPIRONE HYDROCHLORIDE 15 MG: 5 TABLET ORAL at 08:18

## 2022-01-01 RX ADMIN — ENOXAPARIN SODIUM 30 MG: 100 INJECTION SUBCUTANEOUS at 21:36

## 2022-01-01 RX ADMIN — FINASTERIDE 5 MG: 5 TABLET, FILM COATED ORAL at 08:02

## 2022-01-01 RX ADMIN — ALBUTEROL SULFATE 2.5 MG: 2.5 SOLUTION RESPIRATORY (INHALATION) at 08:23

## 2022-01-01 RX ADMIN — DULOXETINE HYDROCHLORIDE 60 MG: 60 CAPSULE, DELAYED RELEASE ORAL at 07:52

## 2022-01-01 RX ADMIN — GABAPENTIN 100 MG: 100 CAPSULE ORAL at 08:02

## 2022-01-01 RX ADMIN — GABAPENTIN 100 MG: 100 CAPSULE ORAL at 07:51

## 2022-01-01 RX ADMIN — DULOXETINE HYDROCHLORIDE 60 MG: 60 CAPSULE, DELAYED RELEASE ORAL at 10:30

## 2022-01-01 RX ADMIN — GABAPENTIN 100 MG: 100 CAPSULE ORAL at 13:15

## 2022-01-01 RX ADMIN — DEXAMETHASONE 6 MG: 4 TABLET ORAL at 08:24

## 2022-01-01 RX ADMIN — IPRATROPIUM BROMIDE AND ALBUTEROL SULFATE 1 AMPULE: 2.5; .5 SOLUTION RESPIRATORY (INHALATION) at 14:23

## 2022-01-01 RX ADMIN — BUDESONIDE 500 MCG: 0.5 SUSPENSION RESPIRATORY (INHALATION) at 18:48

## 2022-01-01 RX ADMIN — DEXMEDETOMIDINE HYDROCHLORIDE 0.5 MCG/KG/HR: 100 INJECTION, SOLUTION INTRAVENOUS at 04:45

## 2022-01-01 RX ADMIN — OXYCODONE HYDROCHLORIDE 60 MG: 15 TABLET, FILM COATED, EXTENDED RELEASE ORAL at 14:48

## 2022-01-01 RX ADMIN — ENOXAPARIN SODIUM 30 MG: 100 INJECTION SUBCUTANEOUS at 10:42

## 2022-01-01 RX ADMIN — ALBUTEROL SULFATE 2.5 MG: 2.5 SOLUTION RESPIRATORY (INHALATION) at 15:08

## 2022-01-01 RX ADMIN — LORAZEPAM 2 MG: 2 INJECTION INTRAMUSCULAR; INTRAVENOUS at 21:11

## 2022-01-01 RX ADMIN — MORPHINE SULFATE 2 MG: 2 INJECTION, SOLUTION INTRAMUSCULAR; INTRAVENOUS at 00:14

## 2022-01-01 RX ADMIN — IBUPROFEN 400 MG: 400 TABLET, FILM COATED ORAL at 22:28

## 2022-01-01 RX ADMIN — QUETIAPINE FUMARATE 100 MG: 100 TABLET ORAL at 09:48

## 2022-01-01 RX ADMIN — METOPROLOL TARTRATE 25 MG: 25 TABLET, FILM COATED ORAL at 22:38

## 2022-01-01 RX ADMIN — Medication 4 MG: at 18:19

## 2022-01-01 RX ADMIN — BUSPIRONE HYDROCHLORIDE 15 MG: 5 TABLET ORAL at 09:31

## 2022-01-01 RX ADMIN — WATER 1.2 ML: 1 INJECTION INTRAMUSCULAR; INTRAVENOUS; SUBCUTANEOUS at 18:02

## 2022-01-01 RX ADMIN — IPRATROPIUM BROMIDE AND ALBUTEROL SULFATE 1 AMPULE: 2.5; .5 SOLUTION RESPIRATORY (INHALATION) at 05:36

## 2022-01-01 RX ADMIN — Medication 2000 UNITS: at 08:51

## 2022-01-01 RX ADMIN — ENOXAPARIN SODIUM 30 MG: 100 INJECTION SUBCUTANEOUS at 20:05

## 2022-01-01 RX ADMIN — HYDROCODONE BITARTRATE AND ACETAMINOPHEN 1 TABLET: 10; 325 TABLET ORAL at 05:43

## 2022-01-01 RX ADMIN — CETIRIZINE HYDROCHLORIDE 10 MG: 10 TABLET, FILM COATED ORAL at 09:04

## 2022-01-01 RX ADMIN — IPRATROPIUM BROMIDE AND ALBUTEROL SULFATE 1 AMPULE: 2.5; .5 SOLUTION RESPIRATORY (INHALATION) at 11:06

## 2022-01-01 RX ADMIN — QUETIAPINE FUMARATE 50 MG: 25 TABLET ORAL at 21:39

## 2022-01-01 RX ADMIN — Medication 2000 UNITS: at 08:14

## 2022-01-01 RX ADMIN — ENOXAPARIN SODIUM 30 MG: 100 INJECTION SUBCUTANEOUS at 08:13

## 2022-01-01 RX ADMIN — BUSPIRONE HYDROCHLORIDE 15 MG: 5 TABLET ORAL at 08:02

## 2022-01-01 RX ADMIN — GABAPENTIN 100 MG: 100 CAPSULE ORAL at 20:39

## 2022-01-01 RX ADMIN — QUETIAPINE FUMARATE 100 MG: 100 TABLET ORAL at 20:48

## 2022-01-01 RX ADMIN — GABAPENTIN 100 MG: 100 CAPSULE ORAL at 22:34

## 2022-01-01 RX ADMIN — CETIRIZINE HYDROCHLORIDE 10 MG: 10 TABLET, FILM COATED ORAL at 09:09

## 2022-01-01 RX ADMIN — HALOPERIDOL LACTATE 5 MG: 5 INJECTION, SOLUTION INTRAMUSCULAR at 01:00

## 2022-01-01 RX ADMIN — IPRATROPIUM BROMIDE AND ALBUTEROL SULFATE 1 AMPULE: 2.5; .5 SOLUTION RESPIRATORY (INHALATION) at 18:48

## 2022-01-01 RX ADMIN — ALBUTEROL SULFATE 2.5 MG: 2.5 SOLUTION RESPIRATORY (INHALATION) at 06:39

## 2022-01-01 RX ADMIN — HEPARIN SODIUM 5000 UNITS: 5000 INJECTION INTRAVENOUS; SUBCUTANEOUS at 05:58

## 2022-01-01 RX ADMIN — GABAPENTIN 100 MG: 100 CAPSULE ORAL at 14:42

## 2022-01-01 RX ADMIN — FAMOTIDINE 20 MG: 20 TABLET, FILM COATED ORAL at 08:37

## 2022-01-01 RX ADMIN — GUAIFENESIN SYRUP AND DEXTROMETHORPHAN 5 ML: 100; 10 SYRUP ORAL at 09:54

## 2022-01-01 RX ADMIN — CETIRIZINE HYDROCHLORIDE 10 MG: 10 TABLET, FILM COATED ORAL at 08:02

## 2022-01-01 RX ADMIN — IPRATROPIUM BROMIDE AND ALBUTEROL SULFATE 1 AMPULE: 2.5; .5 SOLUTION RESPIRATORY (INHALATION) at 14:57

## 2022-01-01 RX ADMIN — GUAIFENESIN SYRUP AND DEXTROMETHORPHAN 5 ML: 100; 10 SYRUP ORAL at 05:00

## 2022-01-01 RX ADMIN — QUETIAPINE FUMARATE 25 MG: 25 TABLET ORAL at 08:51

## 2022-01-01 RX ADMIN — GABAPENTIN 100 MG: 100 CAPSULE ORAL at 09:57

## 2022-01-01 RX ADMIN — DEXMEDETOMIDINE HYDROCHLORIDE 0.4 MCG/KG/HR: 100 INJECTION, SOLUTION INTRAVENOUS at 08:40

## 2022-01-01 RX ADMIN — IPRATROPIUM BROMIDE AND ALBUTEROL SULFATE 1 AMPULE: 2.5; .5 SOLUTION RESPIRATORY (INHALATION) at 07:20

## 2022-01-01 RX ADMIN — OXYCODONE HYDROCHLORIDE 60 MG: 15 TABLET, FILM COATED, EXTENDED RELEASE ORAL at 15:15

## 2022-01-01 RX ADMIN — SODIUM CHLORIDE 25 ML: 9 INJECTION, SOLUTION INTRAVENOUS at 14:39

## 2022-01-01 RX ADMIN — IPRATROPIUM BROMIDE AND ALBUTEROL SULFATE 1 AMPULE: 2.5; .5 SOLUTION RESPIRATORY (INHALATION) at 10:02

## 2022-01-01 RX ADMIN — ONDANSETRON 4 MG: 2 INJECTION INTRAMUSCULAR; INTRAVENOUS at 14:28

## 2022-01-01 RX ADMIN — IPRATROPIUM BROMIDE AND ALBUTEROL SULFATE 1 AMPULE: 2.5; .5 SOLUTION RESPIRATORY (INHALATION) at 14:24

## 2022-01-01 RX ADMIN — HEPARIN SODIUM 5000 UNITS: 5000 INJECTION INTRAVENOUS; SUBCUTANEOUS at 14:21

## 2022-01-01 RX ADMIN — METOPROLOL TARTRATE 5 MG: 5 INJECTION INTRAVENOUS at 05:20

## 2022-01-01 RX ADMIN — IPRATROPIUM BROMIDE AND ALBUTEROL SULFATE 1 AMPULE: 2.5; .5 SOLUTION RESPIRATORY (INHALATION) at 18:03

## 2022-01-01 RX ADMIN — SODIUM CHLORIDE, PRESERVATIVE FREE 10 ML: 5 INJECTION INTRAVENOUS at 08:50

## 2022-01-01 RX ADMIN — POTASSIUM CHLORIDE 20 MEQ: 20 TABLET, EXTENDED RELEASE ORAL at 15:19

## 2022-01-01 RX ADMIN — ALBUTEROL SULFATE 2.5 MG: 2.5 SOLUTION RESPIRATORY (INHALATION) at 10:53

## 2022-01-01 RX ADMIN — SODIUM CHLORIDE, PRESERVATIVE FREE 10 ML: 5 INJECTION INTRAVENOUS at 20:17

## 2022-01-01 RX ADMIN — BUDESONIDE 500 MCG: 0.5 SUSPENSION RESPIRATORY (INHALATION) at 19:56

## 2022-01-01 RX ADMIN — GUAIFENESIN SYRUP AND DEXTROMETHORPHAN 5 ML: 100; 10 SYRUP ORAL at 21:39

## 2022-01-01 RX ADMIN — METOPROLOL 25 MG: 25 TABLET ORAL at 22:59

## 2022-01-01 RX ADMIN — BUSPIRONE HYDROCHLORIDE 15 MG: 5 TABLET ORAL at 09:09

## 2022-01-01 RX ADMIN — FINASTERIDE 5 MG: 5 TABLET, FILM COATED ORAL at 09:03

## 2022-01-01 RX ADMIN — QUETIAPINE FUMARATE 75 MG: 25 TABLET ORAL at 21:16

## 2022-01-01 RX ADMIN — HEPARIN SODIUM 5000 UNITS: 5000 INJECTION INTRAVENOUS; SUBCUTANEOUS at 21:06

## 2022-01-01 RX ADMIN — ALBUTEROL SULFATE 2.5 MG: 2.5 SOLUTION RESPIRATORY (INHALATION) at 14:40

## 2022-01-01 RX ADMIN — PIPERACILLIN AND TAZOBACTAM 4500 MG: 4; .5 INJECTION, POWDER, LYOPHILIZED, FOR SOLUTION INTRAVENOUS at 14:54

## 2022-01-01 RX ADMIN — BUDESONIDE 500 MCG: 0.5 SUSPENSION RESPIRATORY (INHALATION) at 07:42

## 2022-01-01 RX ADMIN — QUETIAPINE FUMARATE 50 MG: 25 TABLET ORAL at 09:09

## 2022-01-01 RX ADMIN — PIPERACILLIN AND TAZOBACTAM 4500 MG: 4; .5 INJECTION, POWDER, LYOPHILIZED, FOR SOLUTION INTRAVENOUS at 21:36

## 2022-01-01 RX ADMIN — BUSPIRONE HYDROCHLORIDE 15 MG: 5 TABLET ORAL at 08:48

## 2022-01-01 RX ADMIN — GABAPENTIN 100 MG: 100 CAPSULE ORAL at 20:06

## 2022-01-01 RX ADMIN — FAMOTIDINE 40 MG: 20 TABLET, FILM COATED ORAL at 09:10

## 2022-01-01 RX ADMIN — GUAIFENESIN AND DEXTROMETHORPHAN 5 ML: 100; 10 SYRUP ORAL at 11:48

## 2022-01-01 RX ADMIN — HEPARIN SODIUM 5000 UNITS: 5000 INJECTION INTRAVENOUS; SUBCUTANEOUS at 22:23

## 2022-01-01 RX ADMIN — BUDESONIDE 500 MCG: 0.5 SUSPENSION RESPIRATORY (INHALATION) at 06:07

## 2022-01-01 RX ADMIN — CETIRIZINE HYDROCHLORIDE 10 MG: 10 TABLET, FILM COATED ORAL at 09:31

## 2022-01-01 RX ADMIN — SODIUM CHLORIDE, PRESERVATIVE FREE 10 ML: 5 INJECTION INTRAVENOUS at 03:14

## 2022-01-01 RX ADMIN — GABAPENTIN 100 MG: 100 CAPSULE ORAL at 21:16

## 2022-01-01 RX ADMIN — FAMOTIDINE 20 MG: 20 TABLET, FILM COATED ORAL at 20:35

## 2022-01-01 RX ADMIN — OXYCODONE HYDROCHLORIDE 60 MG: 40 TABLET, FILM COATED, EXTENDED RELEASE ORAL at 15:39

## 2022-01-01 RX ADMIN — Medication 2000 UNITS: at 09:10

## 2022-01-01 RX ADMIN — LORAZEPAM 2 MG: 2 INJECTION INTRAMUSCULAR; INTRAVENOUS at 13:08

## 2022-01-01 RX ADMIN — WATER 1.2 ML: 1 INJECTION INTRAMUSCULAR; INTRAVENOUS; SUBCUTANEOUS at 02:36

## 2022-01-01 RX ADMIN — IPRATROPIUM BROMIDE AND ALBUTEROL SULFATE 1 AMPULE: 2.5; .5 SOLUTION RESPIRATORY (INHALATION) at 06:05

## 2022-01-01 RX ADMIN — SODIUM CHLORIDE, PRESERVATIVE FREE 10 ML: 5 INJECTION INTRAVENOUS at 08:36

## 2022-01-01 RX ADMIN — ALBUTEROL SULFATE 2.5 MG: 2.5 SOLUTION RESPIRATORY (INHALATION) at 09:43

## 2022-01-01 RX ADMIN — LORAZEPAM 1 MG: 2 INJECTION INTRAMUSCULAR; INTRAVENOUS at 19:48

## 2022-01-01 RX ADMIN — METOPROLOL TARTRATE 5 MG: 5 INJECTION INTRAVENOUS at 22:58

## 2022-01-01 RX ADMIN — SODIUM CHLORIDE, PRESERVATIVE FREE 10 ML: 5 INJECTION INTRAVENOUS at 20:20

## 2022-01-01 RX ADMIN — FINASTERIDE 5 MG: 5 TABLET, FILM COATED ORAL at 08:59

## 2022-01-01 RX ADMIN — LORAZEPAM 2 MG: 2 INJECTION INTRAMUSCULAR; INTRAVENOUS at 03:29

## 2022-01-01 RX ADMIN — QUETIAPINE FUMARATE 100 MG: 100 TABLET ORAL at 20:40

## 2022-01-01 RX ADMIN — FAMOTIDINE 20 MG: 20 TABLET, FILM COATED ORAL at 08:02

## 2022-01-01 RX ADMIN — PANTOPRAZOLE SODIUM 40 MG: 40 INJECTION, POWDER, FOR SOLUTION INTRAVENOUS at 08:30

## 2022-01-01 RX ADMIN — Medication 2000 UNITS: at 09:48

## 2022-01-01 RX ADMIN — QUETIAPINE FUMARATE 100 MG: 100 TABLET ORAL at 20:06

## 2022-01-01 RX ADMIN — PIPERACILLIN AND TAZOBACTAM 4500 MG: 4; .5 INJECTION, POWDER, LYOPHILIZED, FOR SOLUTION INTRAVENOUS at 00:10

## 2022-01-01 RX ADMIN — SODIUM CHLORIDE, PRESERVATIVE FREE 10 ML: 5 INJECTION INTRAVENOUS at 21:47

## 2022-01-01 RX ADMIN — CETIRIZINE HYDROCHLORIDE 10 MG: 10 TABLET, FILM COATED ORAL at 08:29

## 2022-01-01 RX ADMIN — Medication 4 MG: at 11:52

## 2022-01-01 RX ADMIN — OXYCODONE HYDROCHLORIDE 60 MG: 15 TABLET, FILM COATED, EXTENDED RELEASE ORAL at 14:18

## 2022-01-01 RX ADMIN — ACETAMINOPHEN 650 MG: 325 TABLET ORAL at 21:38

## 2022-01-01 RX ADMIN — LORAZEPAM 1 MG: 2 INJECTION INTRAMUSCULAR; INTRAVENOUS at 02:21

## 2022-01-01 RX ADMIN — BUDESONIDE 500 MCG: 0.5 SUSPENSION RESPIRATORY (INHALATION) at 18:03

## 2022-01-01 RX ADMIN — BUDESONIDE 500 MCG: 0.5 SUSPENSION RESPIRATORY (INHALATION) at 06:25

## 2022-01-01 RX ADMIN — ALBUTEROL SULFATE 2.5 MG: 2.5 SOLUTION RESPIRATORY (INHALATION) at 14:19

## 2022-01-01 RX ADMIN — ENOXAPARIN SODIUM 30 MG: 100 INJECTION SUBCUTANEOUS at 08:19

## 2022-01-01 RX ADMIN — ONDANSETRON 4 MG: 2 INJECTION INTRAMUSCULAR; INTRAVENOUS at 03:29

## 2022-01-01 RX ADMIN — DULOXETINE HYDROCHLORIDE 60 MG: 60 CAPSULE, DELAYED RELEASE ORAL at 09:49

## 2022-01-01 RX ADMIN — FAMOTIDINE 20 MG: 20 TABLET, FILM COATED ORAL at 12:45

## 2022-01-01 RX ADMIN — IPRATROPIUM BROMIDE AND ALBUTEROL SULFATE 1 AMPULE: 2.5; .5 SOLUTION RESPIRATORY (INHALATION) at 19:56

## 2022-01-01 RX ADMIN — DEXMEDETOMIDINE HYDROCHLORIDE 0.5 MCG/KG/HR: 100 INJECTION, SOLUTION INTRAVENOUS at 11:19

## 2022-01-01 RX ADMIN — ALBUTEROL SULFATE 2.5 MG: 2.5 SOLUTION RESPIRATORY (INHALATION) at 18:12

## 2022-01-01 RX ADMIN — OXYCODONE HYDROCHLORIDE 60 MG: 15 TABLET, FILM COATED, EXTENDED RELEASE ORAL at 16:25

## 2022-01-01 RX ADMIN — IPRATROPIUM BROMIDE AND ALBUTEROL SULFATE 1 AMPULE: 2.5; .5 SOLUTION RESPIRATORY (INHALATION) at 15:12

## 2022-01-01 RX ADMIN — IPRATROPIUM BROMIDE AND ALBUTEROL SULFATE 1 AMPULE: 2.5; .5 SOLUTION RESPIRATORY (INHALATION) at 21:03

## 2022-01-01 RX ADMIN — GABAPENTIN 100 MG: 100 CAPSULE ORAL at 08:13

## 2022-01-01 RX ADMIN — DULOXETINE HYDROCHLORIDE 60 MG: 60 CAPSULE, DELAYED RELEASE ORAL at 09:03

## 2022-01-01 RX ADMIN — METOPROLOL 25 MG: 25 TABLET ORAL at 09:49

## 2022-01-01 RX ADMIN — Medication 2000 UNITS: at 08:59

## 2022-01-01 RX ADMIN — HYDROCODONE BITARTRATE AND ACETAMINOPHEN 1 TABLET: 10; 325 TABLET ORAL at 00:15

## 2022-01-01 RX ADMIN — DEXMEDETOMIDINE HYDROCHLORIDE 0.4 MCG/KG/HR: 100 INJECTION, SOLUTION INTRAVENOUS at 22:04

## 2022-01-01 RX ADMIN — ENOXAPARIN SODIUM 30 MG: 100 INJECTION SUBCUTANEOUS at 09:03

## 2022-01-01 RX ADMIN — SODIUM CHLORIDE: 9 INJECTION, SOLUTION INTRAVENOUS at 23:06

## 2022-01-01 RX ADMIN — Medication 4 MG: at 17:07

## 2022-01-01 RX ADMIN — IPRATROPIUM BROMIDE AND ALBUTEROL SULFATE 1 AMPULE: 2.5; .5 SOLUTION RESPIRATORY (INHALATION) at 09:48

## 2022-01-01 RX ADMIN — MORPHINE SULFATE 2 MG: 2 INJECTION, SOLUTION INTRAMUSCULAR; INTRAVENOUS at 13:21

## 2022-01-01 RX ADMIN — SODIUM CHLORIDE, PRESERVATIVE FREE 10 ML: 5 INJECTION INTRAVENOUS at 19:59

## 2022-01-01 RX ADMIN — FUROSEMIDE 40 MG: 10 INJECTION, SOLUTION INTRAMUSCULAR; INTRAVENOUS at 15:54

## 2022-01-01 RX ADMIN — GABAPENTIN 100 MG: 100 CAPSULE ORAL at 14:21

## 2022-01-01 RX ADMIN — FAMOTIDINE 20 MG: 20 TABLET, FILM COATED ORAL at 19:49

## 2022-01-01 RX ADMIN — SODIUM CHLORIDE, PRESERVATIVE FREE 10 ML: 5 INJECTION INTRAVENOUS at 20:07

## 2022-01-01 RX ADMIN — LORAZEPAM 1 MG: 2 INJECTION INTRAMUSCULAR; INTRAVENOUS at 17:22

## 2022-01-01 RX ADMIN — QUETIAPINE FUMARATE 100 MG: 100 TABLET ORAL at 21:05

## 2022-01-01 RX ADMIN — LORAZEPAM 2 MG: 2 INJECTION INTRAMUSCULAR; INTRAVENOUS at 19:26

## 2022-01-01 RX ADMIN — GABAPENTIN 100 MG: 100 CAPSULE ORAL at 22:58

## 2022-01-01 RX ADMIN — MORPHINE SULFATE 2 MG: 2 INJECTION, SOLUTION INTRAMUSCULAR; INTRAVENOUS at 03:43

## 2022-01-01 RX ADMIN — HEPARIN SODIUM 5000 UNITS: 5000 INJECTION INTRAVENOUS; SUBCUTANEOUS at 14:46

## 2022-01-01 RX ADMIN — DIPHENHYDRAMINE HYDROCHLORIDE 25 MG: 50 INJECTION, SOLUTION INTRAMUSCULAR; INTRAVENOUS at 00:25

## 2022-01-01 RX ADMIN — Medication 2000 UNITS: at 08:23

## 2022-01-01 RX ADMIN — QUETIAPINE FUMARATE 100 MG: 100 TABLET ORAL at 08:02

## 2022-01-01 RX ADMIN — LORAZEPAM 2 MG: 2 INJECTION INTRAMUSCULAR; INTRAVENOUS at 22:21

## 2022-01-01 RX ADMIN — ENOXAPARIN SODIUM 30 MG: 100 INJECTION SUBCUTANEOUS at 08:28

## 2022-01-01 RX ADMIN — FUROSEMIDE 40 MG: 10 INJECTION, SOLUTION INTRAMUSCULAR; INTRAVENOUS at 19:09

## 2022-01-01 RX ADMIN — IPRATROPIUM BROMIDE AND ALBUTEROL SULFATE 1 AMPULE: 2.5; .5 SOLUTION RESPIRATORY (INHALATION) at 19:04

## 2022-01-01 RX ADMIN — ACETAMINOPHEN 325 MG: 325 TABLET ORAL at 21:06

## 2022-01-01 RX ADMIN — IPRATROPIUM BROMIDE AND ALBUTEROL SULFATE 1 AMPULE: 2.5; .5 SOLUTION RESPIRATORY (INHALATION) at 11:09

## 2022-01-01 RX ADMIN — GABAPENTIN 100 MG: 100 CAPSULE ORAL at 08:24

## 2022-01-01 RX ADMIN — QUETIAPINE FUMARATE 100 MG: 100 TABLET ORAL at 20:39

## 2022-01-01 RX ADMIN — Medication 4 MG: at 20:16

## 2022-01-01 RX ADMIN — IPRATROPIUM BROMIDE AND ALBUTEROL SULFATE 1 AMPULE: 2.5; .5 SOLUTION RESPIRATORY (INHALATION) at 15:25

## 2022-01-01 RX ADMIN — GABAPENTIN 100 MG: 100 CAPSULE ORAL at 20:22

## 2022-01-01 RX ADMIN — FAMOTIDINE 20 MG: 20 TABLET, FILM COATED ORAL at 10:39

## 2022-01-01 RX ADMIN — SODIUM CHLORIDE 1000 ML: 9 INJECTION, SOLUTION INTRAVENOUS at 17:25

## 2022-01-01 RX ADMIN — FAMOTIDINE 20 MG: 20 TABLET, FILM COATED ORAL at 09:58

## 2022-01-01 RX ADMIN — ALBUTEROL SULFATE 2.5 MG: 2.5 SOLUTION RESPIRATORY (INHALATION) at 11:31

## 2022-01-01 RX ADMIN — BUDESONIDE 500 MCG: 0.5 SUSPENSION RESPIRATORY (INHALATION) at 06:14

## 2022-01-01 RX ADMIN — SODIUM CHLORIDE, PRESERVATIVE FREE 10 ML: 5 INJECTION INTRAVENOUS at 10:29

## 2022-01-01 RX ADMIN — Medication 4 MG: at 14:28

## 2022-01-01 RX ADMIN — GABAPENTIN 100 MG: 100 CAPSULE ORAL at 20:49

## 2022-01-01 RX ADMIN — BUDESONIDE 500 MCG: 0.5 SUSPENSION RESPIRATORY (INHALATION) at 05:48

## 2022-01-01 RX ADMIN — CETIRIZINE HYDROCHLORIDE 10 MG: 10 TABLET, FILM COATED ORAL at 08:49

## 2022-01-01 RX ADMIN — POTASSIUM CHLORIDE 40 MEQ: 20 TABLET, EXTENDED RELEASE ORAL at 10:04

## 2022-01-01 RX ADMIN — FAMOTIDINE 20 MG: 20 TABLET, FILM COATED ORAL at 22:35

## 2022-01-01 RX ADMIN — ZIPRASIDONE MESYLATE 20 MG: 20 INJECTION, POWDER, LYOPHILIZED, FOR SOLUTION INTRAMUSCULAR at 09:06

## 2022-01-01 RX ADMIN — HEPARIN SODIUM 5000 UNITS: 5000 INJECTION INTRAVENOUS; SUBCUTANEOUS at 15:29

## 2022-01-01 RX ADMIN — IPRATROPIUM BROMIDE AND ALBUTEROL SULFATE 1 AMPULE: 2.5; .5 SOLUTION RESPIRATORY (INHALATION) at 11:13

## 2022-01-01 RX ADMIN — SODIUM CHLORIDE 0.5 MCG/KG/HR: 9 INJECTION, SOLUTION INTRAVENOUS at 04:43

## 2022-01-01 RX ADMIN — BUDESONIDE 500 MCG: 0.5 SUSPENSION RESPIRATORY (INHALATION) at 18:08

## 2022-01-01 RX ADMIN — HEPARIN SODIUM 5000 UNITS: 5000 INJECTION INTRAVENOUS; SUBCUTANEOUS at 14:59

## 2022-01-01 RX ADMIN — DEXMEDETOMIDINE HYDROCHLORIDE 0.5 MCG/KG/HR: 100 INJECTION, SOLUTION INTRAVENOUS at 17:34

## 2022-01-01 RX ADMIN — Medication 2000 UNITS: at 09:04

## 2022-01-01 RX ADMIN — IPRATROPIUM BROMIDE AND ALBUTEROL SULFATE 1 AMPULE: 2.5; .5 SOLUTION RESPIRATORY (INHALATION) at 11:14

## 2022-01-01 RX ADMIN — ALBUTEROL SULFATE 2.5 MG: 2.5 SOLUTION RESPIRATORY (INHALATION) at 20:59

## 2022-01-01 RX ADMIN — CETIRIZINE HYDROCHLORIDE 10 MG: 10 TABLET, FILM COATED ORAL at 09:58

## 2022-01-01 RX ADMIN — Medication 4 MG: at 09:47

## 2022-01-01 RX ADMIN — IPRATROPIUM BROMIDE AND ALBUTEROL SULFATE 1 AMPULE: 2.5; .5 SOLUTION RESPIRATORY (INHALATION) at 19:54

## 2022-01-01 RX ADMIN — GABAPENTIN 100 MG: 100 CAPSULE ORAL at 14:48

## 2022-01-01 RX ADMIN — OXYCODONE HYDROCHLORIDE 60 MG: 15 TABLET, FILM COATED, EXTENDED RELEASE ORAL at 08:02

## 2022-01-01 RX ADMIN — PANTOPRAZOLE SODIUM 40 MG: 40 INJECTION, POWDER, FOR SOLUTION INTRAVENOUS at 10:29

## 2022-01-01 RX ADMIN — HYDROCODONE BITARTRATE AND ACETAMINOPHEN 2 TABLET: 5; 325 TABLET ORAL at 03:45

## 2022-01-01 RX ADMIN — BUSPIRONE HYDROCHLORIDE 15 MG: 5 TABLET ORAL at 08:50

## 2022-01-01 RX ADMIN — HYDROCODONE BITARTRATE AND ACETAMINOPHEN 2 TABLET: 5; 325 TABLET ORAL at 08:28

## 2022-01-01 RX ADMIN — FUROSEMIDE 40 MG: 10 INJECTION, SOLUTION INTRAMUSCULAR; INTRAVENOUS at 10:42

## 2022-01-01 RX ADMIN — LORAZEPAM 0.5 MG: 2 INJECTION INTRAMUSCULAR; INTRAVENOUS at 14:02

## 2022-01-01 RX ADMIN — SODIUM CHLORIDE, PRESERVATIVE FREE 10 ML: 5 INJECTION INTRAVENOUS at 09:49

## 2022-01-01 RX ADMIN — SODIUM CHLORIDE, PRESERVATIVE FREE 10 ML: 5 INJECTION INTRAVENOUS at 08:59

## 2022-01-01 RX ADMIN — IPRATROPIUM BROMIDE AND ALBUTEROL SULFATE 1 AMPULE: 2.5; .5 SOLUTION RESPIRATORY (INHALATION) at 18:08

## 2022-01-01 RX ADMIN — METOPROLOL 25 MG: 25 TABLET ORAL at 09:04

## 2022-01-01 RX ADMIN — IPRATROPIUM BROMIDE AND ALBUTEROL SULFATE 1 AMPULE: 2.5; .5 SOLUTION RESPIRATORY (INHALATION) at 06:07

## 2022-01-01 RX ADMIN — METOPROLOL TARTRATE 25 MG: 25 TABLET, FILM COATED ORAL at 20:22

## 2022-01-01 RX ADMIN — SODIUM CHLORIDE, PRESERVATIVE FREE 10 ML: 5 INJECTION INTRAVENOUS at 21:05

## 2022-01-01 RX ADMIN — GABAPENTIN 100 MG: 100 CAPSULE ORAL at 09:06

## 2022-01-01 RX ADMIN — FENTANYL CITRATE 50 MCG: 50 INJECTION, SOLUTION INTRAMUSCULAR; INTRAVENOUS at 14:47

## 2022-01-01 RX ADMIN — METOPROLOL 25 MG: 25 TABLET ORAL at 08:02

## 2022-01-01 RX ADMIN — FAMOTIDINE 20 MG: 20 TABLET, FILM COATED ORAL at 20:48

## 2022-01-01 RX ADMIN — FINASTERIDE 5 MG: 5 TABLET, FILM COATED ORAL at 08:50

## 2022-01-01 RX ADMIN — ALBUTEROL SULFATE 2.5 MG: 2.5 SOLUTION RESPIRATORY (INHALATION) at 14:31

## 2022-01-01 RX ADMIN — QUETIAPINE FUMARATE 100 MG: 100 TABLET ORAL at 08:13

## 2022-01-01 RX ADMIN — HEPARIN SODIUM 5000 UNITS: 5000 INJECTION INTRAVENOUS; SUBCUTANEOUS at 21:10

## 2022-01-01 RX ADMIN — FUROSEMIDE 40 MG: 10 INJECTION, SOLUTION INTRAMUSCULAR; INTRAVENOUS at 16:47

## 2022-01-01 RX ADMIN — METOPROLOL 25 MG: 25 TABLET ORAL at 20:40

## 2022-01-01 RX ADMIN — DULOXETINE HYDROCHLORIDE 60 MG: 60 CAPSULE, DELAYED RELEASE ORAL at 08:59

## 2022-01-01 RX ADMIN — METOPROLOL 25 MG: 25 TABLET ORAL at 20:36

## 2022-01-01 RX ADMIN — METOPROLOL TARTRATE 5 MG: 5 INJECTION INTRAVENOUS at 21:49

## 2022-01-01 RX ADMIN — HEPARIN SODIUM 5000 UNITS: 5000 INJECTION INTRAVENOUS; SUBCUTANEOUS at 21:38

## 2022-01-01 RX ADMIN — HEPARIN SODIUM 5000 UNITS: 5000 INJECTION INTRAVENOUS; SUBCUTANEOUS at 21:01

## 2022-01-01 RX ADMIN — QUETIAPINE FUMARATE 50 MG: 25 TABLET ORAL at 09:04

## 2022-01-01 RX ADMIN — DULOXETINE HYDROCHLORIDE 60 MG: 60 CAPSULE, DELAYED RELEASE ORAL at 09:30

## 2022-01-01 RX ADMIN — BUSPIRONE HYDROCHLORIDE 15 MG: 5 TABLET ORAL at 09:03

## 2022-01-01 RX ADMIN — FAMOTIDINE 20 MG: 20 TABLET, FILM COATED ORAL at 20:39

## 2022-01-01 RX ADMIN — MORPHINE SULFATE 4 MG: 4 INJECTION INTRAVENOUS at 13:11

## 2022-01-01 RX ADMIN — Medication 2000 UNITS: at 08:29

## 2022-01-01 RX ADMIN — MORPHINE SULFATE 4 MG: 4 INJECTION INTRAVENOUS at 17:31

## 2022-01-01 RX ADMIN — CETIRIZINE HYDROCHLORIDE 10 MG: 10 TABLET, FILM COATED ORAL at 10:39

## 2022-01-01 RX ADMIN — SODIUM CHLORIDE, PRESERVATIVE FREE 10 ML: 5 INJECTION INTRAVENOUS at 09:32

## 2022-01-01 RX ADMIN — SODIUM CHLORIDE: 9 INJECTION, SOLUTION INTRAVENOUS at 06:49

## 2022-01-01 RX ADMIN — FINASTERIDE 5 MG: 5 TABLET, FILM COATED ORAL at 09:30

## 2022-01-01 RX ADMIN — GUAIFENESIN AND DEXTROMETHORPHAN 5 ML: 100; 10 SYRUP ORAL at 03:25

## 2022-01-01 RX ADMIN — FINASTERIDE 5 MG: 5 TABLET, FILM COATED ORAL at 08:35

## 2022-01-01 RX ADMIN — HEPARIN SODIUM 5000 UNITS: 5000 INJECTION INTRAVENOUS; SUBCUTANEOUS at 05:49

## 2022-01-01 RX ADMIN — PIPERACILLIN AND TAZOBACTAM 4500 MG: 4; .5 INJECTION, POWDER, LYOPHILIZED, FOR SOLUTION INTRAVENOUS at 15:16

## 2022-01-01 RX ADMIN — QUETIAPINE FUMARATE 100 MG: 100 TABLET ORAL at 08:59

## 2022-01-01 RX ADMIN — POTASSIUM CHLORIDE 40 MEQ: 20 TABLET, EXTENDED RELEASE ORAL at 14:19

## 2022-01-01 RX ADMIN — ACETAMINOPHEN 650 MG: 650 SUPPOSITORY RECTAL at 00:41

## 2022-01-01 RX ADMIN — BUDESONIDE 500 MCG: 0.5 SUSPENSION RESPIRATORY (INHALATION) at 06:13

## 2022-01-01 RX ADMIN — PIPERACILLIN AND TAZOBACTAM 4500 MG: 4; .5 INJECTION, POWDER, LYOPHILIZED, FOR SOLUTION INTRAVENOUS at 14:36

## 2022-01-01 RX ADMIN — IPRATROPIUM BROMIDE AND ALBUTEROL SULFATE 1 AMPULE: 2.5; .5 SOLUTION RESPIRATORY (INHALATION) at 19:05

## 2022-01-01 RX ADMIN — QUETIAPINE FUMARATE 100 MG: 25 TABLET ORAL at 22:35

## 2022-01-01 RX ADMIN — CETIRIZINE HYDROCHLORIDE 10 MG: 10 TABLET, FILM COATED ORAL at 09:48

## 2022-01-01 RX ADMIN — OXYCODONE HYDROCHLORIDE 60 MG: 15 TABLET, FILM COATED, EXTENDED RELEASE ORAL at 15:17

## 2022-01-01 RX ADMIN — BUDESONIDE 500 MCG: 0.5 SUSPENSION RESPIRATORY (INHALATION) at 22:51

## 2022-01-01 RX ADMIN — FAMOTIDINE 20 MG: 20 TABLET, FILM COATED ORAL at 20:06

## 2022-01-01 RX ADMIN — ALBUTEROL SULFATE 2.5 MG: 2.5 SOLUTION RESPIRATORY (INHALATION) at 10:12

## 2022-01-01 RX ADMIN — OXYCODONE HYDROCHLORIDE 60 MG: 15 TABLET, FILM COATED, EXTENDED RELEASE ORAL at 14:41

## 2022-01-01 RX ADMIN — FAMOTIDINE 20 MG: 20 TABLET, FILM COATED ORAL at 20:40

## 2022-01-01 RX ADMIN — MORPHINE SULFATE 4 MG: 4 INJECTION INTRAVENOUS at 20:58

## 2022-01-01 RX ADMIN — DULOXETINE HYDROCHLORIDE 60 MG: 60 CAPSULE, DELAYED RELEASE ORAL at 08:02

## 2022-01-01 RX ADMIN — QUETIAPINE FUMARATE 50 MG: 25 TABLET ORAL at 21:42

## 2022-01-01 RX ADMIN — Medication 4 MG: at 03:21

## 2022-01-01 RX ADMIN — MORPHINE SULFATE 4 MG: 4 INJECTION INTRAVENOUS at 12:03

## 2022-01-01 RX ADMIN — Medication 4 MG: at 19:35

## 2022-01-01 RX ADMIN — PIPERACILLIN AND TAZOBACTAM 4500 MG: 4; .5 INJECTION, POWDER, LYOPHILIZED, FOR SOLUTION INTRAVENOUS at 06:35

## 2022-01-01 RX ADMIN — OXYCODONE HYDROCHLORIDE 60 MG: 15 TABLET, FILM COATED, EXTENDED RELEASE ORAL at 19:49

## 2022-01-01 RX ADMIN — QUETIAPINE FUMARATE 100 MG: 100 TABLET ORAL at 20:36

## 2022-01-01 RX ADMIN — FINASTERIDE 5 MG: 5 TABLET, FILM COATED ORAL at 09:48

## 2022-01-01 RX ADMIN — OXYCODONE HYDROCHLORIDE 60 MG: 15 TABLET, FILM COATED, EXTENDED RELEASE ORAL at 20:36

## 2022-01-01 RX ADMIN — DEXAMETHASONE SODIUM PHOSPHATE 6 MG: 10 INJECTION, SOLUTION INTRAMUSCULAR; INTRAVENOUS at 21:45

## 2022-01-01 RX ADMIN — GABAPENTIN 100 MG: 100 CAPSULE ORAL at 08:51

## 2022-01-01 RX ADMIN — GABAPENTIN 100 MG: 100 CAPSULE ORAL at 14:36

## 2022-01-01 RX ADMIN — QUETIAPINE FUMARATE 100 MG: 100 TABLET ORAL at 08:38

## 2022-01-01 RX ADMIN — OXYCODONE HYDROCHLORIDE 60 MG: 15 TABLET, FILM COATED, EXTENDED RELEASE ORAL at 14:36

## 2022-01-01 RX ADMIN — DULOXETINE HYDROCHLORIDE 60 MG: 60 CAPSULE, DELAYED RELEASE ORAL at 09:47

## 2022-01-01 RX ADMIN — GUAIFENESIN SYRUP AND DEXTROMETHORPHAN 5 ML: 100; 10 SYRUP ORAL at 20:47

## 2022-01-01 RX ADMIN — FINASTERIDE 5 MG: 5 TABLET, FILM COATED ORAL at 10:30

## 2022-01-01 RX ADMIN — DULOXETINE HYDROCHLORIDE 60 MG: 60 CAPSULE, DELAYED RELEASE ORAL at 09:04

## 2022-01-01 RX ADMIN — METOPROLOL TARTRATE 5 MG: 5 INJECTION INTRAVENOUS at 14:23

## 2022-01-01 RX ADMIN — ENOXAPARIN SODIUM 30 MG: 100 INJECTION SUBCUTANEOUS at 22:58

## 2022-01-01 RX ADMIN — DEXAMETHASONE 6 MG: 4 TABLET ORAL at 08:48

## 2022-01-01 RX ADMIN — FINASTERIDE 5 MG: 5 TABLET, FILM COATED ORAL at 08:14

## 2022-01-01 RX ADMIN — BUDESONIDE 500 MCG: 0.5 SUSPENSION RESPIRATORY (INHALATION) at 21:03

## 2022-01-01 RX ADMIN — SODIUM CHLORIDE, PRESERVATIVE FREE 10 ML: 5 INJECTION INTRAVENOUS at 07:52

## 2022-01-01 RX ADMIN — GUAIFENESIN AND DEXTROMETHORPHAN 5 ML: 100; 10 SYRUP ORAL at 06:41

## 2022-01-01 RX ADMIN — IPRATROPIUM BROMIDE AND ALBUTEROL SULFATE 1 AMPULE: 2.5; .5 SOLUTION RESPIRATORY (INHALATION) at 15:26

## 2022-01-01 RX ADMIN — MORPHINE SULFATE 4 MG: 4 INJECTION INTRAVENOUS at 14:34

## 2022-01-01 RX ADMIN — MORPHINE SULFATE 4 MG: 4 INJECTION INTRAVENOUS at 08:24

## 2022-01-01 RX ADMIN — QUETIAPINE FUMARATE 100 MG: 100 TABLET ORAL at 19:58

## 2022-01-01 RX ADMIN — Medication 4 MG: at 17:00

## 2022-01-01 RX ADMIN — HEPARIN SODIUM 5000 UNITS: 5000 INJECTION INTRAVENOUS; SUBCUTANEOUS at 05:00

## 2022-01-01 RX ADMIN — QUETIAPINE FUMARATE 50 MG: 25 TABLET ORAL at 10:30

## 2022-01-01 RX ADMIN — HEPARIN SODIUM 5000 UNITS: 5000 INJECTION INTRAVENOUS; SUBCUTANEOUS at 14:34

## 2022-01-01 RX ADMIN — SODIUM CHLORIDE: 9 INJECTION, SOLUTION INTRAVENOUS at 21:53

## 2022-01-01 RX ADMIN — GABAPENTIN 100 MG: 100 CAPSULE ORAL at 15:17

## 2022-01-01 RX ADMIN — HEPARIN SODIUM 5000 UNITS: 5000 INJECTION INTRAVENOUS; SUBCUTANEOUS at 06:32

## 2022-01-01 RX ADMIN — BUSPIRONE HYDROCHLORIDE 15 MG: 5 TABLET ORAL at 09:04

## 2022-01-01 RX ADMIN — SODIUM CHLORIDE: 9 INJECTION, SOLUTION INTRAVENOUS at 04:12

## 2022-01-01 RX ADMIN — PIPERACILLIN AND TAZOBACTAM 4500 MG: 4; .5 INJECTION, POWDER, LYOPHILIZED, FOR SOLUTION INTRAVENOUS at 05:51

## 2022-01-01 RX ADMIN — HEPARIN SODIUM 5000 UNITS: 5000 INJECTION INTRAVENOUS; SUBCUTANEOUS at 14:42

## 2022-01-01 RX ADMIN — ACETAMINOPHEN 650 MG: 325 TABLET ORAL at 19:57

## 2022-01-01 RX ADMIN — OXYCODONE HYDROCHLORIDE 60 MG: 15 TABLET, FILM COATED, EXTENDED RELEASE ORAL at 08:13

## 2022-01-01 RX ADMIN — QUETIAPINE FUMARATE 100 MG: 100 TABLET ORAL at 08:19

## 2022-01-01 RX ADMIN — QUETIAPINE FUMARATE 50 MG: 25 TABLET ORAL at 08:24

## 2022-01-01 RX ADMIN — OXYCODONE HYDROCHLORIDE 60 MG: 40 TABLET, FILM COATED, EXTENDED RELEASE ORAL at 00:07

## 2022-01-01 RX ADMIN — BUSPIRONE HYDROCHLORIDE 15 MG: 5 TABLET ORAL at 08:29

## 2022-01-01 RX ADMIN — ALUMINUM HYDROXIDE, MAGNESIUM HYDROXIDE, AND SIMETHICONE 30 ML: 200; 200; 20 SUSPENSION ORAL at 04:17

## 2022-01-01 RX ADMIN — GABAPENTIN 100 MG: 100 CAPSULE ORAL at 09:05

## 2022-01-01 RX ADMIN — OXYCODONE HYDROCHLORIDE 60 MG: 40 TABLET, FILM COATED, EXTENDED RELEASE ORAL at 07:51

## 2022-01-01 RX ADMIN — SODIUM CHLORIDE, PRESERVATIVE FREE 10 ML: 5 INJECTION INTRAVENOUS at 08:41

## 2022-01-01 RX ADMIN — HYDROCODONE BITARTRATE AND ACETAMINOPHEN 1 TABLET: 10; 325 TABLET ORAL at 04:44

## 2022-01-01 RX ADMIN — LORAZEPAM 2 MG: 2 INJECTION INTRAMUSCULAR; INTRAVENOUS at 18:34

## 2022-01-01 RX ADMIN — HEPARIN SODIUM 5000 UNITS: 5000 INJECTION INTRAVENOUS; SUBCUTANEOUS at 20:20

## 2022-01-01 RX ADMIN — CETIRIZINE HYDROCHLORIDE 10 MG: 10 TABLET, FILM COATED ORAL at 09:46

## 2022-01-01 RX ADMIN — BUDESONIDE 500 MCG: 0.5 SUSPENSION RESPIRATORY (INHALATION) at 19:05

## 2022-01-01 RX ADMIN — PIPERACILLIN AND TAZOBACTAM 4500 MG: 4; .5 INJECTION, POWDER, LYOPHILIZED, FOR SOLUTION INTRAVENOUS at 06:27

## 2022-01-01 RX ADMIN — BUSPIRONE HYDROCHLORIDE 15 MG: 10 TABLET ORAL at 09:57

## 2022-01-01 RX ADMIN — GABAPENTIN 100 MG: 100 CAPSULE ORAL at 14:28

## 2022-01-01 RX ADMIN — Medication 4 MG: at 22:33

## 2022-01-01 RX ADMIN — CETIRIZINE HYDROCHLORIDE 10 MG: 10 TABLET, FILM COATED ORAL at 08:34

## 2022-01-01 RX ADMIN — GUAIFENESIN SYRUP AND DEXTROMETHORPHAN 5 ML: 100; 10 SYRUP ORAL at 06:27

## 2022-01-01 RX ADMIN — HEPARIN SODIUM 5000 UNITS: 5000 INJECTION INTRAVENOUS; SUBCUTANEOUS at 06:21

## 2022-01-01 RX ADMIN — OXYCODONE HYDROCHLORIDE 60 MG: 15 TABLET, FILM COATED, EXTENDED RELEASE ORAL at 20:48

## 2022-01-01 RX ADMIN — FAMOTIDINE 20 MG: 20 TABLET, FILM COATED ORAL at 20:34

## 2022-01-01 RX ADMIN — MORPHINE SULFATE 4 MG: 4 INJECTION INTRAVENOUS at 18:37

## 2022-01-01 RX ADMIN — SODIUM CHLORIDE, PRESERVATIVE FREE 10 ML: 5 INJECTION INTRAVENOUS at 21:30

## 2022-01-01 RX ADMIN — DULOXETINE HYDROCHLORIDE 60 MG: 60 CAPSULE, DELAYED RELEASE ORAL at 08:50

## 2022-01-01 RX ADMIN — Medication 0.2 MG: at 09:36

## 2022-01-01 RX ADMIN — Medication 2000 UNITS: at 08:02

## 2022-01-01 RX ADMIN — IPRATROPIUM BROMIDE AND ALBUTEROL SULFATE 1 AMPULE: 2.5; .5 SOLUTION RESPIRATORY (INHALATION) at 07:54

## 2022-01-01 RX ADMIN — LORAZEPAM 1 MG: 2 INJECTION INTRAMUSCULAR; INTRAVENOUS at 06:32

## 2022-01-01 RX ADMIN — CETIRIZINE HYDROCHLORIDE 10 MG: 10 TABLET, FILM COATED ORAL at 08:50

## 2022-01-01 RX ADMIN — METOPROLOL 25 MG: 25 TABLET ORAL at 08:37

## 2022-01-01 RX ADMIN — GABAPENTIN 100 MG: 100 CAPSULE ORAL at 15:30

## 2022-01-01 RX ADMIN — GABAPENTIN 100 MG: 100 CAPSULE ORAL at 14:26

## 2022-01-01 RX ADMIN — SODIUM CHLORIDE, PRESERVATIVE FREE 10 ML: 5 INJECTION INTRAVENOUS at 12:09

## 2022-01-01 RX ADMIN — LORAZEPAM 2 MG: 2 INJECTION INTRAMUSCULAR; INTRAVENOUS at 21:06

## 2022-01-01 RX ADMIN — ENOXAPARIN SODIUM 30 MG: 100 INJECTION SUBCUTANEOUS at 20:35

## 2022-01-01 RX ADMIN — OXYCODONE HYDROCHLORIDE 60 MG: 15 TABLET, FILM COATED, EXTENDED RELEASE ORAL at 08:38

## 2022-01-01 RX ADMIN — GABAPENTIN 100 MG: 100 CAPSULE ORAL at 09:48

## 2022-01-01 RX ADMIN — ENOXAPARIN SODIUM 30 MG: 100 INJECTION SUBCUTANEOUS at 09:49

## 2022-01-01 RX ADMIN — GUAIFENESIN SYRUP AND DEXTROMETHORPHAN 5 ML: 100; 10 SYRUP ORAL at 09:05

## 2022-01-01 RX ADMIN — DEXMEDETOMIDINE HYDROCHLORIDE 0.5 MCG/KG/HR: 100 INJECTION, SOLUTION INTRAVENOUS at 15:28

## 2022-01-01 RX ADMIN — Medication 2000 UNITS: at 08:49

## 2022-01-01 RX ADMIN — GABAPENTIN 100 MG: 100 CAPSULE ORAL at 21:32

## 2022-01-01 RX ADMIN — METOPROLOL 25 MG: 25 TABLET ORAL at 20:49

## 2022-01-01 RX ADMIN — Medication 2000 UNITS: at 10:40

## 2022-01-01 RX ADMIN — BUDESONIDE 500 MCG: 0.5 SUSPENSION RESPIRATORY (INHALATION) at 19:03

## 2022-01-01 RX ADMIN — DEXMEDETOMIDINE HYDROCHLORIDE 0.5 MCG/KG/HR: 100 INJECTION, SOLUTION INTRAVENOUS at 00:03

## 2022-01-01 RX ADMIN — FAMOTIDINE 20 MG: 20 TABLET, FILM COATED ORAL at 19:59

## 2022-01-01 ASSESSMENT — PAIN SCALES - GENERAL
PAINLEVEL_OUTOF10: 0
PAINLEVEL_OUTOF10: 8
PAINLEVEL_OUTOF10: 0
PAINLEVEL_OUTOF10: 4
PAINLEVEL_OUTOF10: 10
PAINLEVEL_OUTOF10: 5
PAINLEVEL_OUTOF10: 0
PAINLEVEL_OUTOF10: 10
PAINLEVEL_OUTOF10: 8
PAINLEVEL_OUTOF10: 3
PAINLEVEL_OUTOF10: 9
PAINLEVEL_OUTOF10: 7
PAINLEVEL_OUTOF10: 8
PAINLEVEL_OUTOF10: 0
PAINLEVEL_OUTOF10: 9
PAINLEVEL_OUTOF10: 0
PAINLEVEL_OUTOF10: 3
PAINLEVEL_OUTOF10: 0
PAINLEVEL_OUTOF10: 8
PAINLEVEL_OUTOF10: 0
PAINLEVEL_OUTOF10: 8
PAINLEVEL_OUTOF10: 10
PAINLEVEL_OUTOF10: 9
PAINLEVEL_OUTOF10: 6
PAINLEVEL_OUTOF10: 8
PAINLEVEL_OUTOF10: 7
PAINLEVEL_OUTOF10: 7
PAINLEVEL_OUTOF10: 0
PAINLEVEL_OUTOF10: 8
PAINLEVEL_OUTOF10: 7
PAINLEVEL_OUTOF10: 8
PAINLEVEL_OUTOF10: 8
PAINLEVEL_OUTOF10: 4
PAINLEVEL_OUTOF10: 9
PAINLEVEL_OUTOF10: 8
PAINLEVEL_OUTOF10: 0
PAINLEVEL_OUTOF10: 7
PAINLEVEL_OUTOF10: 7
PAINLEVEL_OUTOF10: 0
PAINLEVEL_OUTOF10: 10
PAINLEVEL_OUTOF10: 7
PAINLEVEL_OUTOF10: 7
PAINLEVEL_OUTOF10: 9
PAINLEVEL_OUTOF10: 8
PAINLEVEL_OUTOF10: 9
PAINLEVEL_OUTOF10: 8
PAINLEVEL_OUTOF10: 6
PAINLEVEL_OUTOF10: 8
PAINLEVEL_OUTOF10: 7
PAINLEVEL_OUTOF10: 8
PAINLEVEL_OUTOF10: 0
PAINLEVEL_OUTOF10: 7
PAINLEVEL_OUTOF10: 9
PAINLEVEL_OUTOF10: 8
PAINLEVEL_OUTOF10: 0
PAINLEVEL_OUTOF10: 6
PAINLEVEL_OUTOF10: 9
PAINLEVEL_OUTOF10: 7
PAINLEVEL_OUTOF10: 0
PAINLEVEL_OUTOF10: 8
PAINLEVEL_OUTOF10: 8
PAINLEVEL_OUTOF10: 0
PAINLEVEL_OUTOF10: 10
PAINLEVEL_OUTOF10: 0
PAINLEVEL_OUTOF10: 9
PAINLEVEL_OUTOF10: 10
PAINLEVEL_OUTOF10: 8
PAINLEVEL_OUTOF10: 8
PAINLEVEL_OUTOF10: 0
PAINLEVEL_OUTOF10: 8
PAINLEVEL_OUTOF10: 9
PAINLEVEL_OUTOF10: 8
PAINLEVEL_OUTOF10: 3
PAINLEVEL_OUTOF10: 8
PAINLEVEL_OUTOF10: 0
PAINLEVEL_OUTOF10: 0
PAINLEVEL_OUTOF10: 4
PAINLEVEL_OUTOF10: 7
PAINLEVEL_OUTOF10: 8
PAINLEVEL_OUTOF10: 0
PAINLEVEL_OUTOF10: 6
PAINLEVEL_OUTOF10: 7
PAINLEVEL_OUTOF10: 7
PAINLEVEL_OUTOF10: 8
PAINLEVEL_OUTOF10: 9
PAINLEVEL_OUTOF10: 0
PAINLEVEL_OUTOF10: 7
PAINLEVEL_OUTOF10: 0
PAINLEVEL_OUTOF10: 9
PAINLEVEL_OUTOF10: 0
PAINLEVEL_OUTOF10: 0
PAINLEVEL_OUTOF10: 8
PAINLEVEL_OUTOF10: 0
PAINLEVEL_OUTOF10: 3
PAINLEVEL_OUTOF10: 5
PAINLEVEL_OUTOF10: 0
PAINLEVEL_OUTOF10: 6
PAINLEVEL_OUTOF10: 8
PAINLEVEL_OUTOF10: 0
PAINLEVEL_OUTOF10: 8
PAINLEVEL_OUTOF10: 4
PAINLEVEL_OUTOF10: 5
PAINLEVEL_OUTOF10: 8
PAINLEVEL_OUTOF10: 10
PAINLEVEL_OUTOF10: 7

## 2022-01-01 ASSESSMENT — ENCOUNTER SYMPTOMS
VOMITING: 0
CONSTIPATION: 0
EYE REDNESS: 0
WHEEZING: 0
COLOR CHANGE: 0
EYE DISCHARGE: 0
DIARRHEA: 0
CHEST TIGHTNESS: 0
CONSTIPATION: 0
NAUSEA: 0
COUGH: 0
WHEEZING: 0
NAUSEA: 0
DIARRHEA: 0
SHORTNESS OF BREATH: 1
VOMITING: 0
VOMITING: 0
DIARRHEA: 0
SORE THROAT: 0
SHORTNESS OF BREATH: 1
BLOOD IN STOOL: 0
COUGH: 0
COUGH: 0
CHEST TIGHTNESS: 0
ABDOMINAL PAIN: 0
NAUSEA: 0
RHINORRHEA: 0
BLOOD IN STOOL: 0
SHORTNESS OF BREATH: 0
ABDOMINAL PAIN: 0

## 2022-01-01 ASSESSMENT — PAIN SCALES - WONG BAKER
WONGBAKER_NUMERICALRESPONSE: 8
WONGBAKER_NUMERICALRESPONSE: 0
WONGBAKER_NUMERICALRESPONSE: 0
WONGBAKER_NUMERICALRESPONSE: 10
WONGBAKER_NUMERICALRESPONSE: 8
WONGBAKER_NUMERICALRESPONSE: 10
WONGBAKER_NUMERICALRESPONSE: 0
WONGBAKER_NUMERICALRESPONSE: 2
WONGBAKER_NUMERICALRESPONSE: 8
WONGBAKER_NUMERICALRESPONSE: 2
WONGBAKER_NUMERICALRESPONSE: 2
WONGBAKER_NUMERICALRESPONSE: 8
WONGBAKER_NUMERICALRESPONSE: 2
WONGBAKER_NUMERICALRESPONSE: 8
WONGBAKER_NUMERICALRESPONSE: 0
WONGBAKER_NUMERICALRESPONSE: 2
WONGBAKER_NUMERICALRESPONSE: 2
WONGBAKER_NUMERICALRESPONSE: 8
WONGBAKER_NUMERICALRESPONSE: 2
WONGBAKER_NUMERICALRESPONSE: 2
WONGBAKER_NUMERICALRESPONSE: 0
WONGBAKER_NUMERICALRESPONSE: 2
WONGBAKER_NUMERICALRESPONSE: 0
WONGBAKER_NUMERICALRESPONSE: 4
WONGBAKER_NUMERICALRESPONSE: 0
WONGBAKER_NUMERICALRESPONSE: 2
WONGBAKER_NUMERICALRESPONSE: 6
WONGBAKER_NUMERICALRESPONSE: 6
WONGBAKER_NUMERICALRESPONSE: 2
WONGBAKER_NUMERICALRESPONSE: 4
WONGBAKER_NUMERICALRESPONSE: 0

## 2022-01-01 ASSESSMENT — PAIN DESCRIPTION - FREQUENCY
FREQUENCY: CONTINUOUS
FREQUENCY: INTERMITTENT
FREQUENCY: CONTINUOUS
FREQUENCY: CONTINUOUS
FREQUENCY: INTERMITTENT
FREQUENCY: CONTINUOUS
FREQUENCY: INTERMITTENT
FREQUENCY: CONTINUOUS
FREQUENCY: INTERMITTENT
FREQUENCY: CONTINUOUS

## 2022-01-01 ASSESSMENT — PAIN DESCRIPTION - ORIENTATION
ORIENTATION: RIGHT;LEFT;MID
ORIENTATION: LOWER
ORIENTATION: RIGHT;LEFT;MID
ORIENTATION: RIGHT;LEFT;LOWER;UPPER
ORIENTATION: RIGHT;LEFT;MID
ORIENTATION: RIGHT;LEFT;MID

## 2022-01-01 ASSESSMENT — PAIN DESCRIPTION - PROGRESSION

## 2022-01-01 ASSESSMENT — PAIN DESCRIPTION - PAIN TYPE
TYPE: ACUTE PAIN
TYPE: CHRONIC PAIN
TYPE: CHRONIC PAIN
TYPE: ACUTE PAIN
TYPE: ACUTE PAIN
TYPE: CHRONIC PAIN
TYPE: ACUTE PAIN
TYPE: CHRONIC PAIN
TYPE: CHRONIC PAIN
TYPE: ACUTE PAIN
TYPE: CHRONIC PAIN
TYPE: CHRONIC PAIN;NEUROPATHIC PAIN
TYPE: CHRONIC PAIN;NEUROPATHIC PAIN
TYPE: CHRONIC PAIN

## 2022-01-01 ASSESSMENT — PAIN DESCRIPTION - LOCATION
LOCATION: GENERALIZED
LOCATION: BACK;FOOT
LOCATION: GENERALIZED
LOCATION: BACK
LOCATION: GENERALIZED
LOCATION: GENERALIZED
LOCATION: BACK
LOCATION: GENERALIZED
LOCATION: BACK
LOCATION: GENERALIZED
LOCATION: BACK;GENERALIZED
LOCATION: GENERALIZED
LOCATION: BACK
LOCATION: BACK;FOOT
LOCATION: HEAD
LOCATION: BACK;GENERALIZED
LOCATION: GENERALIZED

## 2022-01-01 ASSESSMENT — PAIN DESCRIPTION - DESCRIPTORS
DESCRIPTORS: ACHING
DESCRIPTORS: PATIENT UNABLE TO DESCRIBE
DESCRIPTORS: ACHING
DESCRIPTORS: ACHING;DISCOMFORT;SORE;TENDER
DESCRIPTORS: ACHING
DESCRIPTORS: ACHING;DISCOMFORT
DESCRIPTORS: ACHING;DISCOMFORT
DESCRIPTORS: ACHING;CONSTANT
DESCRIPTORS: ACHING
DESCRIPTORS: ACHING
DESCRIPTORS: HEADACHE
DESCRIPTORS: ACHING
DESCRIPTORS: ACHING
DESCRIPTORS: ACHING;TENDER
DESCRIPTORS: ACHING

## 2022-01-01 ASSESSMENT — PAIN DESCRIPTION - ONSET
ONSET: ON-GOING

## 2022-01-01 ASSESSMENT — PAIN - FUNCTIONAL ASSESSMENT

## 2022-01-08 PROBLEM — R41.82 AMS (ALTERED MENTAL STATUS): Status: ACTIVE | Noted: 2022-01-01

## 2022-01-08 NOTE — ED PROVIDER NOTES
EMERGENCY DEPARTMENT ENCOUNTER    Pt Name: Mario Rosado  MRN: [id number]  [place] 1960  Date of evaluation: 1/8/22  CHIEF COMPLAINT       Chief Complaint   Patient presents with    Altered Mental Status     HISTORY OF PRESENT ILLNESS   57-year-old male was sent to the emergency department today for complaints of altered mental status. Patient also had a fall at home. Patient denies any specific complaints of chest pain or shortness of breath at this time, he states that he does not know the date but he does not feel like he would typically know the date because he does not leave his house. Patient denies any dysuria or hematuria, he denies any fevers or chills. He describes his symptoms as severe. REVIEW OF SYSTEMS     Review of Systems   Constitutional: Positive for fatigue. Negative for chills and fever. HENT: Negative for rhinorrhea and sore throat. Eyes: Negative for discharge, redness and visual disturbance. Respiratory: Negative for cough and shortness of breath. Cardiovascular: Negative for chest pain, palpitations and leg swelling. Gastrointestinal: Negative for diarrhea, nausea and vomiting. Genitourinary: Negative for dysuria and hematuria. Musculoskeletal: Negative for arthralgias, myalgias and neck pain. Skin: Negative for color change and rash. Neurological: Positive for weakness. Negative for seizures and headaches. Psychiatric/Behavioral: Negative for hallucinations, self-injury and suicidal ideas. PASTMEDICAL HISTORY     Past Medical History:   Diagnosis Date    Anxiety     Arthritis     Atrial fib/flutter, transient     Bradycardia     COPD (chronic obstructive pulmonary disease) ([facility])     Depression     GERD (gastroesophageal reflux disease)     Neuropathy     Pneumonia     Syncope      Past Problem List  There is no problem list on file for this patient.     SURGICAL HISTORY       Past Surgical History:   Procedure Laterality Date    ABLATION OF DYSRHYTHMIC FOCUS  11-    BACK SURGERY      BREAST SURGERY      CARDIAC CATHETERIZATION  2010    CARDIAC PACEMAKER PLACEMENT      CHOLECYSTECTOMY      HAND SURGERY Right     thumb    TONSILLECTOMY      TRANSESOPHAGEAL ECHOCARDIOGRAM  11-     CURRENT MEDICATIONS       Previous Medications    ALBUTEROL (PROVENTIL) (2.5 MG/3ML) 0.083% NEBULIZER SOLUTION    Take 2.5 mg by nebulization every 6 hours as needed for Wheezing    AZITHROMYCIN (ZITHROMAX) 250 MG TABLET    Take 250 mg by mouth daily    AZITHROMYCIN (ZITHROMAX) 500 MG INJECTION    Infuse 500 mg intravenously    BUDESONIDE (PULMICORT) 0.5 MG/2ML NEBULIZER SUSPENSION    Take 1 ampule by nebulization 2 times daily    BUSPIRONE (BUSPAR) 15 MG TABLET    Take 15 mg by mouth daily. CHOLECALCIFEROL (VITAMIN D3) 50 MCG (2000 UT) CAPS    Take by mouth    DIAZEPAM (VALIUM) 10 MG TABLET    Take 10 mg by mouth 4 times daily. DULOXETINE (CYMBALTA) 60 MG CAPSULE    Take 60 mg by mouth daily. FAMOTIDINE (PEPCID) 40 MG TABLET    Take 40 mg by mouth daily    FINASTERIDE (PROSCAR) 5 MG TABLET    Take 5 mg by mouth daily. GABAPENTIN (NEURONTIN) 600 MG TABLET    Take 1,200 mg by mouth 3 times daily. HYDROCODONE-ACETAMINOPHEN (NORCO)  MG PER TABLET    Take 1 tablet by mouth every 4 hours. IPRATROPIUM (ATROVENT) 0.02 % NEBULIZER SOLUTION    Take 0.5 mg by nebulization 4 times daily    LORATADINE 10 MG CAPS    Take 1 capsule by mouth daily. MIDODRINE (PROAMATINE) 2.5 MG TABLET    Take 2.5 mg by mouth 3 times daily as needed. Takes one or 2 tabs 3x day    OXYCODONE HCL ER (OXYCONTIN) 60 MG T12A    Take 1 tablet by mouth 3 times daily. QUETIAPINE (SEROQUEL) 25 MG TABLET    Take 50 mg by mouth nightly. RIVAROXABAN (XARELTO) 20 MG TABS TABLET    Take 1 tablet by mouth daily (with breakfast).     RIVASTIGMINE (EXELON) 4.6 MG/24HR    Place 1 patch onto the skin daily    SPIRONOLACTONE (ALDACTONE) 25 MG TABLET    Take 25 mg by mouth 2 times daily     ALLERGIES     is allergic to latex, bee venom, and prednisone. FAMILY HISTORY     has no family status information on file. SOCIAL HISTORY       Social History     Tobacco Use    Smoking status: Current Every Day Smoker    Smokeless tobacco: Not on file    Tobacco comment: vapor cig   Substance Use Topics    Alcohol use: No    Drug use: Not on file     PHYSICAL EXAM     INITIAL VITALS: /68   Pulse 85   Temp 98.4 °F (36.9 °C) (Oral)   Resp 12   Ht 5' 11\" (1.803 m)   Wt 265 lb (120.2 kg)   SpO2 96%   BMI 36.96 kg/m²    Physical Exam  Constitutional:       Appearance: Normal appearance. He is well-developed. He is not ill-appearing or toxic-appearing. HENT:      Head: Normocephalic and atraumatic. Eyes:      Conjunctiva/sclera: Conjunctivae normal.      Pupils: Pupils are equal, round, and reactive to light. Neck:      Trachea: Trachea normal.   Cardiovascular:      Rate and Rhythm: Normal rate and regular rhythm. Heart sounds: S1 normal and S2 normal. No murmur heard. Pulmonary:      Effort: Pulmonary effort is normal. No accessory muscle usage or respiratory distress. Breath sounds: Normal breath sounds. Chest:      Chest wall: No deformity or tenderness. Abdominal:      General: Bowel sounds are normal. There is no distension or abdominal bruit. Palpations: Abdomen is not rigid. Tenderness: There is no abdominal tenderness. There is no guarding or rebound. Musculoskeletal:      Cervical back: Normal range of motion and neck supple. Skin:     General: Skin is warm. Findings: No rash. Neurological:      Mental Status: He is alert. He is disoriented. GCS: GCS eye subscore is 4. GCS verbal subscore is 5. GCS motor subscore is 6.    Psychiatric:         Speech: Speech normal.         MEDICAL DECISION MAKIN-year-old presents with complaints of altered mental status, generalized weakness, wife was concerned about the positive COVID-19, plan is basic labs V[facility] COVID swab CT brain and reevaluate. 5:04 PM EST  Patient has evidence of a mild respiratory acidosis, he will be started on BiPAP.    8:26 PM EST  Resting comfortably on the BiPAP, CT scan does not show any evidence of an acute intra cranial pathology. Patient will be admitted to the hospitalist service for further evaluation. He also has evidence of acute kidney injury, bladder scan was unremarkable, we will provide some IV fluids and reevaluate. Patient has very mildly elevated troponins, the delta troponin was small, the patient's repeat EKG did not show any evidence of acute ischemia. Plan is discussion with the hospitalist.         CRITICAL CARE:       PROCEDURES:    Critical Care  Performed by: Biju Ray MD  Authorized by: Biju Ray MD     Critical care provider statement:     Critical care time (minutes):  35    Critical care time was exclusive of:  Separately billable procedures and treating other patients and teaching time    Critical care was necessary to treat or prevent imminent or life-threatening deterioration of the following conditions:  Respiratory failure and CNS failure or compromise    Critical care was time spent personally by me on the following activities:  Discussions with consultants, examination of patient, re-evaluation of patient's condition, ordering and review of radiographic studies and ordering and review of laboratory studies        DIAGNOSTIC RESULTS   EKG:All EKG's are interpreted by the Emergency Department Physician who either signs or Co-signs this chart in the absence of a cardiologist.    Patient EKG shows sinus rhythm with a rate of 86 MS QRS QTC intervals unremarkable the patient has normal axis no ST elevations or depressions, no significant T wave changes. Nonspecific EKG.     RADIOLOGY:All plain film, CT, MRI, and formal ultrasound images (except ED bedside ultrasound) are read by the radiologist, see reports below, unless otherwisenoted in MDM or here. XR CHEST PORTABLE   Final Result   Bilateral lower lung field reticulonodular prominence which is nonspecific   and may represent bronchiolitis, fibrosis, COVID-19 pneumonia or other   infectious/inflammatory process. CT HEAD WO CONTRAST    (Results Pending)     LABS: All lab results were reviewed by myself, and all abnormals are listed below.   Labs Reviewed   COVID-19, RAPID - Abnormal; Notable for the following components:       Result Value    SARS-CoV-2, Rapid DETECTED (*)     All other components within normal limits   BASIC METABOLIC PANEL - Abnormal; Notable for the following components:    CREATININE 3.09 (*)     Bun/Cre Ratio 7 (*)     Sodium 130 (*)     Chloride 89 (*)     GFR Non- 21 (*)     GFR  25 (*)     All other components within normal limits   CBC WITH AUTO DIFFERENTIAL - Abnormal; Notable for the following components:    Hemoglobin 12.6 (*)     Hematocrit 40.4 (*)     RDW 15.2 (*)     Seg Neutrophils 83 (*)     Lymphocytes 7 (*)     Eosinophils % 0 (*)     Immature Granulocytes 1 (*)     Absolute Lymph # 0.43 (*)     All other components within normal limits   TOX SCR, BLD, ED - Abnormal; Notable for the following components:    Acetaminophen Level <68 (*)     Salicylate Lvl <1 (*)     All other components within normal limits   HEPATIC FUNCTION PANEL - Abnormal; Notable for the following components:    ALT 46 (*)     AST 60 (*)     All other components within normal limits   PROTIME-INR - Abnormal; Notable for the following components:    Protime 15.5 (*)     All other components within normal limits   APTT - Abnormal; Notable for the following components:    PTT 36.6 (*)     All other components within normal limits   TROPONIN - Abnormal; Notable for the following components:    Troponin, High Sensitivity 69 (*)     All other components within normal limits   TROPONIN - Abnormal; Notable for the following components:    Troponin, High Sensitivity 77 (*)     All other components within normal limits   VENOUS BLOOD GAS, POINT OF CARE - Abnormal; Notable for the following components:    pH, [place] 7.249 (*)     pCO2, [place] 61.9 (*)     pO2, [place] 29.1 (*)     O2 Sat, [place] 44 (*)     All other components within normal limits   AMMONIA   MAGNESIUM   LACTIC ACID   SPECIMEN REJECTION   LACTIC ACID   URINALYSIS   NOTIFICATION PANEL, POC       EMERGENCY DEPARTMENTCOURSE:         Vitals:    Vitals:    01/08/22 1713 01/08/22 1714 01/08/22 1834 01/08/22 1955   BP: 102/79  (!) 93/59 117/68   Pulse: 96  89 85   Resp:  18 12    Temp:       TempSrc:       SpO2: (!) 75%  96%    Weight:       Height:           The patient was given the following medications while in the emergency department:  Orders Placed This Encounter   Medications    AND Linked Order Group     ziprasidone (GEODON) injection 20 mg     sterile water injection 1.2 mL    ziprasidone (GEODON) 20 MG injection     Nicholas Campo: cabinet override    0.9 % sodium chloride bolus    dexamethasone (PF) (DECADRON) injection 10 mg     CONSULTS:  IP CONSULT TO HOSPITALIST    FINAL IMPRESSION      1. Altered mental status, unspecified altered mental status type    2. Acute respiratory failure with hypercapnia (HCC)    3. COVID-19          DISPOSITION/PLAN   DISPOSITION Decision To Admit 01/08/2022 08:23:01 PM      PATIENT REFERRED TO:  No follow-up provider specified. DISCHARGE MEDICATIONS:  New Prescriptions    No medications on file     The care is provided during an unprecedented national emergency due to the novel coronavirus, COVID 19.   Daniel Da Silva MD  Attending Emergency Physician                   Daniel Da Silva MD  01/08/22 1411       Daniel Da Silva MD  01/08/22 8976

## 2022-01-08 NOTE — ED NOTES
Pt found in hallway by staff. Pt had removed BiPAP, pulled out IV and removed all monitor leads. Pt states he wants to go home. Staff tried to convince pt to go back into bed, pt very argumentative and resisting staff.      Arnaud Leger RN  01/08/22 2707

## 2022-01-09 PROBLEM — R77.8 ELEVATED TROPONIN: Status: ACTIVE | Noted: 2022-01-01

## 2022-01-09 PROBLEM — I48.91 A-FIB (HCC): Status: ACTIVE | Noted: 2022-01-01

## 2022-01-09 PROBLEM — G93.49 ENCEPHALOPATHY DUE TO COVID-19 VIRUS: Status: ACTIVE | Noted: 2022-01-01

## 2022-01-09 PROBLEM — G62.9 NEUROPATHY: Status: ACTIVE | Noted: 2022-01-01

## 2022-01-09 PROBLEM — J44.9 COPD WITHOUT EXACERBATION (HCC): Status: ACTIVE | Noted: 2022-01-01

## 2022-01-09 PROBLEM — J12.82 PNEUMONIA DUE TO COVID-19 VIRUS: Status: ACTIVE | Noted: 2022-01-01

## 2022-01-09 PROBLEM — N17.9 AKI (ACUTE KIDNEY INJURY) (HCC): Status: ACTIVE | Noted: 2022-01-01

## 2022-01-09 PROBLEM — U07.1 COVID-19: Status: ACTIVE | Noted: 2022-01-01

## 2022-01-09 PROBLEM — U07.1 ENCEPHALOPATHY DUE TO COVID-19 VIRUS: Status: ACTIVE | Noted: 2022-01-01

## 2022-01-09 PROBLEM — K21.9 GASTROESOPHAGEAL REFLUX DISEASE WITHOUT ESOPHAGITIS: Status: ACTIVE | Noted: 2022-01-01

## 2022-01-09 NOTE — CONSULTS
confused, pulling off BiPAP and support lines. He is aggressive, combative and argumentative towards staff. PMHx   Past Medical History      Diagnosis Date    Anxiety     Arthritis     Atrial fib/flutter, transient     Bradycardia     COPD (chronic obstructive pulmonary disease) (HCC)     Depression     GERD (gastroesophageal reflux disease)     Neuropathy     Pneumonia     Syncope       Past Surgical History        Procedure Laterality Date    ABLATION OF DYSRHYTHMIC FOCUS  11-    BACK SURGERY      BREAST SURGERY      CARDIAC CATHETERIZATION  2010    CARDIAC PACEMAKER PLACEMENT      CHOLECYSTECTOMY      HAND SURGERY Right     thumb    TONSILLECTOMY      TRANSESOPHAGEAL ECHOCARDIOGRAM  11-       Meds    Current Medications    ziprasidone  20 mg IntraMUSCular Once    And    sterile water  1.2 mL IntraMUSCular Once    heparin (porcine)  5,000 Units SubCUTAneous 3 times per day    busPIRone  15 mg Oral Daily    DULoxetine  60 mg Oral Daily    famotidine  40 mg Oral Daily    finasteride  5 mg Oral Daily    cetirizine  10 mg Oral Daily    QUEtiapine  50 mg Oral Nightly    rivastigmine  1 patch TransDERmal Daily    [Held by provider] spironolactone  25 mg Oral BID    sodium chloride flush  5-40 mL IntraVENous 2 times per day    dexamethasone  6 mg IntraVENous Q24H    Vitamin D  2,000 Units Oral Daily     LORazepam, sodium chloride flush, sodium chloride, ondansetron **OR** ondansetron, magnesium hydroxide, acetaminophen **OR** acetaminophen, guaiFENesin-dextromethorphan  IV Drips/Infusions   sodium chloride 75 mL/hr at 01/09/22 0649    sodium chloride       Home Medications  Not in a hospital admission. Allergies    Latex, Bee venom, and Prednisone  Social History     Social History     Tobacco Use    Smoking status: Current Every Day Smoker    Smokeless tobacco: Not on file    Tobacco comment: vapor cig   Substance Use Topics    Alcohol use: No     Family History    History reviewed.  No pertinent family history. ROS - 11 systems   General Denies any fever or chills  HEENT Denies any diplopia, tinnitus or vertigo  Resp Denies any shortness of breath, cough or wheezing  Cardiac Denies any chest pain, palpitations, claudication or edema  GI Denies any melena, hematochezia, hematemesis or pyrosis   Denies any frequency, urgency, hesitancy or incontinence  Heme Denies bruising or bleeding easily  Endocrine Denies any history of diabetes or thyroid disease  Neuro Denies any focal motor or sensory deficits  Psychiatric Denies anxiety, depression, suicidal ideation  Skin Denies rashes, itching, open sores    Vitals     height is 5' 11\" (1.803 m) and weight is 265 lb (120.2 kg). His oral temperature is 98.8 °F (37.1 °C). His blood pressure is 109/71 and his pulse is 83. His respiration is 16 and oxygen saturation is 97%. Body mass index is 36.96 kg/m². I/O        Intake/Output Summary (Last 24 hours) at 1/9/2022 1305  Last data filed at 1/9/2022 1026  Gross per 24 hour   Intake 150 ml   Output 2650 ml   Net -2500 ml     I/O last 3 completed shifts:  In: -   Out: 2100 [Urine:2100]   Patient Vitals for the past 96 hrs (Last 3 readings):   Weight   01/08/22 1534 265 lb (120.2 kg)     Exam   General Appearance  Awake, alert, confused, agitated  HEENT - Head is normocephalic, atraumatic. Pupil reactive to light  Neck - Supple,  trachea midline and straight  Lungs -moderate air exchange, no crackles or wheezing  Cardiovascular - Heart sounds are normal.  Regular rhythm normal rate without murmur, gallop or rub. Abdomen - Soft, nontender, nondistended, no masses or organomegaly  Neurologic - CN II-XII are grossly intact.  There are no focal motor or sensory deficits  Skin - No bruising or bleeding  Extremities - No cyanosis, clubbing or edema    Labs  - Old records and notes have been reviewed in [facility]   CBC     Lab Results   Component Value Date    WBC 4.2 01/09/2022    RBC 4.44 01/09/2022    RBC 4.23 03/08/2012    HGB 11.9 01/09/2022    HCT 37.6 01/09/2022     01/09/2022     03/08/2012    MCV 84.7 01/09/2022    MCH 26.8 01/09/2022    MCHC 31.6 01/09/2022    RDW 14.7 01/09/2022    LYMPHOPCT 7 01/09/2022    MONOPCT 2 01/09/2022    BASOPCT 1 01/09/2022    MONOSABS 0.08 01/09/2022    LYMPHSABS 0.29 01/09/2022    EOSABS 0.00 01/09/2022    BASOSABS 0.04 01/09/2022    DIFFTYPE NOT REPORTED 01/09/2022     BMP   Lab Results   Component Value Date     01/09/2022    K 4.3 01/09/2022    CL 97 01/09/2022    CO2 22 01/09/2022    BUN 23 01/09/2022    CREATININE 2.31 01/09/2022    GLUCOSE 117 01/09/2022    GLUCOSE 88 03/08/2012    CALCIUM 8.3 01/09/2022    MG 2.0 01/08/2022     LFTS  Lab Results   Component Value Date    ALKPHOS 57 01/09/2022    ALT 46 01/09/2022    AST 82 01/09/2022    PROT 6.6 01/09/2022    BILITOT 0.36 01/09/2022    BILIDIR 0.21 01/08/2022    IBILI 0.24 01/08/2022    LABALBU 3.4 01/09/2022    LABALBU 4.0 03/08/2012     ABG   Lab Results   Component Value Date    ODM9MIB NOT REPORTED 01/09/2022     PTT  Lab Results   Component Value Date    APTT 38.1 (H) 01/09/2022     INR   Lab Results   Component Value Date    INR 1.3 01/09/2022    INR 1.2 01/08/2022    PROTIME 16.1 (H) 01/09/2022    PROTIME 15.5 (H) 01/08/2022       Radiology    CXR       (See actual reports for details)    \"Thank you for asking us to see this patient\"    Case discussed with nurse and patient  Questions and concerns addressed.   Electronically signed by     Rizwan Archuleta MD on 1/9/2022 at 2:34 PM  Pulmonary Critical Care and Sleep Medicine,  [facility]  Cell: [phone]  Office: [phone]

## 2022-01-09 NOTE — ED NOTES
Calling pt's wife to come and be with pt. Pt  Repeatedly yells out for his wife, very loudly while writer is at his bedside, loud voice carries through the ED, very disruptive.      Janey Cruz RN  01/09/22 2418

## 2022-01-09 NOTE — RT PROTOCOL NOTE
RT Inhaler-Nebulizer Bronchodilator Protocol Note    There is a bronchodilator order in the chart from a provider indicating to follow the RT Bronchodilator Protocol and there is an Initiate RT Inhaler-Nebulizer Bronchodilator Protocol order as well (see protocol at bottom of note). CXR Findings:  XR CHEST PORTABLE    Result Date: 1/8/2022  Bilateral lower lung field reticulonodular prominence which is nonspecific and may represent bronchiolitis, fibrosis, COVID-19 pneumonia or other infectious/inflammatory process. The findings from the last RT Protocol Assessment were as follows:   History Pulmonary Disease: Chronic pulmonary disease (aerosol Qid at home)  Respiratory Pattern: Dyspnea on exertion or RR 21-25 bpm  Breath Sounds: Intermittent or unilateral wheezes  Cough: Weak, non-productive  Indication for Bronchodilator Therapy:    Bronchodilator Assessment Score: 11    Aerosolized bronchodilator medication orders have been revised according to the RT Inhaler-Nebulizer Bronchodilator Protocol below. Respiratory Therapist to perform RT Therapy Protocol Assessment initially then follow the protocol. Repeat RT Therapy Protocol Assessment PRN for score 0-3 or on second treatment, BID, and PRN for scores above 3. No Indications - adjust the frequency to every 6 hours PRN wheezing or bronchospasm, if no treatments needed after 48 hours then discontinue using Per Protocol order mode. If indication present, adjust the RT bronchodilator orders based on the Bronchodilator Assessment Score as indicated below. Use Inhaler orders unless patient has one or more of the following: on home nebulizer, not able to hold breath for 10 seconds, is not alert and oriented, cannot activate and use MDI correctly, or respiratory rate 25 breaths per minute or more, then use the equivalent nebulizer order(s) with same Frequency and PRN reasons based on the score.   If a patient is on this medication at home then do not decrease Frequency below that used at home. 0-3 - enter or revise RT bronchodilator order(s) to equivalent RT Bronchodilator order with Frequency of every 4 hours PRN for wheezing or increased work of breathing using Per Protocol order mode. 4-6 - enter or revise RT Bronchodilator order(s) to two equivalent RT bronchodilator orders with one order with BID Frequency and one order with Frequency of every 4 hours PRN wheezing or increased work of breathing using Per Protocol order mode. 7-10 - enter or revise RT Bronchodilator order(s) to two equivalent RT bronchodilator orders with one order with TID Frequency and one order with Frequency of every 4 hours PRN wheezing or increased work of breathing using Per Protocol order mode. 11-13 - enter or revise RT Bronchodilator order(s) to one equivalent RT bronchodilator order with QID Frequency and an Albuterol order with Frequency of every 4 hours PRN wheezing or increased work of breathing using Per Protocol order mode. Greater than 13 - enter or revise RT Bronchodilator order(s) to one equivalent RT bronchodilator order with every 4 hours Frequency and an Albuterol order with Frequency of every 2 hours PRN wheezing or increased work of breathing using Per Protocol order mode. RT to enter RT Home Evaluation for COPD & MDI Assessment order using Per Protocol order mode.     Electronically signed by Mary Lopez RN on 1/9/2022 at 6:18 PM

## 2022-01-09 NOTE — PROGRESS NOTES
Patient continues to be confused/agitated, even with use of geodon and calling of wife. Irina served Dr. Mehnaz Perez for additional orders. Awaiting response.

## 2022-01-09 NOTE — CONSULTS
ECHOCARDIOGRAM  11-        Allergies   Allergen Reactions    Latex     Bee Venom     Prednisone Other (See Comments)     Mood swings       Social History     Socioeconomic History    Marital status:      Spouse name: Not on file    Number of children: Not on file    Years of education: Not on file    Highest education level: Not on file   Occupational History    Not on file   Tobacco Use    Smoking status: Current Every Day Smoker    Smokeless tobacco: Not on file    Tobacco comment: vapor cig   Substance and Sexual Activity    Alcohol use: No    Drug use: Not on file    Sexual activity: Not on file   Other Topics Concern    Not on file   Social History Narrative    Not on file     Social Determinants of Health     Financial Resource Strain:     Difficulty of Paying Living Expenses: Not on file   Food Insecurity:     Worried About Running Out of Food in the Last Year: Not on file    Don of Food in the Last Year: Not on file   Transportation Needs:     Lack of Transportation (Medical): Not on file    Lack of Transportation (Non-Medical):  Not on file   Physical Activity:     Days of Exercise per Week: Not on file    Minutes of Exercise per Session: Not on file   Stress:     Feeling of Stress : Not on file   Social Connections:     Frequency of Communication with Friends and Family: Not on file    Frequency of Social Gatherings with Friends and Family: Not on file    Attends Quaker Services: Not on file    Active Member of Clubs or Organizations: Not on file    Attends Club or Organization Meetings: Not on file    Marital Status: Not on file   Intimate Partner Violence:     Fear of Current or Ex-Partner: Not on file    Emotionally Abused: Not on file    Physically Abused: Not on file    Sexually Abused: Not on file   Housing Stability:     Unable to Pay for Housing in the Last Year: Not on file    Number of Places Lived in the Last Year: Not on file    Unstable Housing in the Last Year: Not on file       Family History:      History reviewed. No pertinent family history. Outpatient Medications:     Not in a hospital admission. Current Medications:     Scheduled Meds:    ziprasidone  20 mg IntraMUSCular Once    And    sterile water  1.2 mL IntraMUSCular Once    heparin (porcine)  5,000 Units SubCUTAneous 3 times per day    busPIRone  15 mg Oral Daily    DULoxetine  60 mg Oral Daily    famotidine  40 mg Oral Daily    finasteride  5 mg Oral Daily    cetirizine  10 mg Oral Daily    QUEtiapine  50 mg Oral Nightly    rivastigmine  1 patch TransDERmal Daily    [Held by provider] spironolactone  25 mg Oral BID    sodium chloride flush  5-40 mL IntraVENous 2 times per day    dexamethasone  6 mg IntraVENous Q24H    Vitamin D  2,000 Units Oral Daily     Continuous Infusions:    sodium chloride 75 mL/hr at 22 0649    sodium chloride       PRN Meds:  LORazepam, sodium chloride flush, sodium chloride, ondansetron **OR** ondansetron, magnesium hydroxide, acetaminophen **OR** acetaminophen, guaiFENesin-dextromethorphan    Review of Systems:     Unable to obtain review system as patient has positive altered mental status. Input/Output:       I/O last 3 completed shifts:  In: -   Out: 2100 [Urine:2100]    Vital Signs:   Temperature:  Temp: 98.8 °F (37.1 °C)  TMax:   Temp (24hrs), Av.6 °F (37 °C), Min:98.4 °F (36.9 °C), Max:98.8 °F (37.1 °C)    Respirations:  Resp: 16  Pulse:   Pulse: 83  BP:    BP: 109/71  BP Range: Systolic (29JRU), LJC:104 , Min:71 , CXZ:644       Diastolic (88GSH), FYF:53, Min:55, Max:126      Physical Examination:     General:  Alert and awake but has positive confusion. HEENT: Atraumatic, normocephalic, no throat congestion, moist mucosa. Eyes:   Pupils equal, round and reactive to light, EOMI. Neck:   Supple  Chest:   Bilateral vesicular breath sounds, no rales or wheezes.   Cardiac:  S1 S2 RR, no murmurs, gallops or rubs.  Abdomen: Soft, non-tender, no masses or organomegaly, BS audible. :   No suprapubic or flank tenderness. Neuro:  Alert and awake but has positive confusion. SKIN:  No rashes, good skin turgor. Extremities:  Trace edema. Labs:       Recent Labs     01/08/22  1600 01/09/22  0403   WBC 6.2 4.2   RBC 4.69 4.44   HGB 12.6* 11.9*   HCT 40.4* 37.6*   MCV 86.1 84.7   MCH 26.9 26.8   MCHC 31.2 31.6   RDW 15.2* 14.7*    143   MPV 8.9 8.8      BMP:   Recent Labs     01/08/22  1600 01/09/22  0403   * 134*   K 3.9 4.3   CL 89* 97*   CO2 26 22   BUN 23 23   CREATININE 3.09* 2.31*   GLUCOSE 92 117*   CALCIUM 8.7 8.3*      Magnesium:    Recent Labs     01/08/22  1600   MG 2.0     Albumin:    Recent Labs     01/08/22  1600 01/09/22  0403   LABALBU 3.8 3.4*     SPEP:  Lab Results   Component Value Date    PROT 6.6 01/09/2022     Urinalysis/Chemistries:      Lab Results   Component Value Date    NITRU NEGATIVE 01/09/2022    COLORU Yellow 01/09/2022    PHUR 5.5 01/09/2022    WBCUA 0 TO 2 01/09/2022    RBCUA 5 TO 10 01/09/2022    MUCUS NOT REPORTED 01/09/2022    TRICHOMONAS NOT REPORTED 01/09/2022    YEAST NOT REPORTED 01/09/2022    BACTERIA NOT REPORTED 01/09/2022    SPECGRAV 1.020 01/09/2022    LEUKOCYTESUR NEGATIVE 01/09/2022    UROBILINOGEN Normal 01/09/2022    BILIRUBINUR NEGATIVE 01/09/2022    BILIRUBINUR NEGATIVE 03/30/2012    GLUCOSEU NEGATIVE 01/09/2022    GLUCOSEU NEGATIVE 03/30/2012    KETUA NEGATIVE 01/09/2022    AMORPHOUS NOT REPORTED 01/09/2022     Urine Creatinine:     Lab Results   Component Value Date    LABCREA 35.0 10/22/2013     Radiology:     Reviewed. Assessment:     1. Acute Kidney Injury nonoliguric likely due to ATN from underlying infection along with hypotension and further complicated by use of diuretics at home. Baseline creatinine seems to be normal.  2.  COVID-19 infection.   3.  Acute hypoxic and hypercapnic respiratory failure requiring noninvasive positive pressure ventilation. 4.  Hypotension due to an infection. 5.  Mild hyponatremia and patient is also on Cymbalta. 6.  History of A. fib. 7.  Altered mental status. Plan:   1. Decrease IV fluids to 50 cc/hr, normal saline. Hold Aldactone. 2.  Monitor strict I's and O's renal function. 3.  Will Check Renal Ultrasound to r/o element of obstruction and to assess the kidney size/echotexture. 4.  Comprehensive urine testing including Urinalysis, Urine sodium, potassium, chloride, Urine protein and creatinine to quantify the proteinuria if any at all. Will check urinary eosinophils as well. 5.  Will Order serum and urine protein electrophoresis to r/o element to occult paraprotein disease. 6.  Will order Hepatitis B and C, , Complement levels. 7.  Covid-19 management as per infectious disease. 8.  BMP in AM.  9.  Will follow. Nutrition   Please ensure that patient is on a renal diet/TF. Avoid nephrotoxic drugs/contrast exposure. Thank you for the consultation. Please do not hesitate to contact us for any further questions/concerns. We will continue to follow along with you. Kolby Guillen MD  [facility]     This note is created with the assistance of a speech-recognition program. While intending to generate a document that actually reflects the content of the visit, no guarantees can be provided that every mistake has been identified and corrected by editing.

## 2022-01-09 NOTE — PROGRESS NOTES
Pt admitted to room 1009 from ER. Patient alert to self only. Oriented to room and call light/tv controls. Bed in lowest position, wheels locked, 2/4 side rails up  Call light in reach, room free of clutter, adequate lighting provided.

## 2022-01-09 NOTE — ED NOTES
Pt is awake, took off EKG wires, and is trying to take off bipap. Pt states he wants to go home, and is trying to get out of bed. Kris Casey NP paged and asked to come evaluate patient.       Alayna Lebron RN  01/09/22 [id number]

## 2022-01-09 NOTE — H&P
[facility]  Office: [address], DO, Wesleyfallon Whitley, DO, Geetha Daniels, DO, Hermes Schafer, DO, Tiffanie Durham MD, Manju Urena MD, Esperanza Jerry MD, Lowell Richmond MD, Aura Ridley MD, Sulaiman Stoll MD, Dallas Pinon MD, Mendoza Linda DO, Les Neumann DO, Chesley Saint, MD,  Glynda Fothergill, DO, Jose Griffin MD, Rnad Hunt MD, Chuy Poole MD, Kathy Skinner MD, Karthik Rand MD, Dariusz Rubio MD, Verónica Lund MD, Leroy Padilla, [facility], [facility], CNP, Dennie Hageman, CNP, Gissel Estrella, CNS, Diana Bejarano, CNP, Rylan Bauman, CNP, Bridget Jack, CNP, Aston Dorsey, CNP, Alina Haro, CNP, Guillermo Bo PA-C, Fabricio Eugene, DNP, Barb Lew, DNP, Deric Kee, CNP, Vinny Tavarez, CNP, Sajan Torres, CNP, Hussein Uribe, CNP, Hannah Mills, CNP, Tenisha Silva, CNP         [facility]    HISTORY AND PHYSICAL EXAMINATION            Date:   1/9/2022  Patient name:  Severa Skill  Date of admission:  1/8/2022  3:33 PM  MRN:   [id number]  Account:  [de-identified]  YOB: 1960  PCP:    Dominique Farris MD  Room:   [place] [address]  Code Status:    Full Code    Chief Complaint:     Chief Complaint   Patient presents with    Altered Mental Status       History Obtained From:     electronic medical record    History of Present Illness:     Severa Skill is a 59 y.o. Non- / non  male who presents with Altered Mental Status   and is admitted to the hospital for the management of COVID-19. Since to the emergency room with altered mental status. Is confused and not able to provide any history for me. According to the ER nurse he was brought in by his wife after a fall at home. She states that he has been altered as well. No additional information available. He was found to have mild respiratory acidosis and started on BiPAP. Covid is positive.   He also had JO with BUN of 23 and creatinine of 3.09.  Previous kidney function has been normal.  And mildly elevated troponins at 69 and then 77. Past Medical History:     Past Medical History:   Diagnosis Date    Anxiety     Arthritis     Atrial fib/flutter, transient     Bradycardia     [facility]     Depression     GERD (gastroesophageal reflux disease)     Neuropathy     Pneumonia     Syncope         Past Surgical History:     Past Surgical History:   Procedure Laterality Date    ABLATION OF DYSRHYTHMIC FOCUS  11-    BACK SURGERY      BREAST SURGERY      CARDIAC CATHETERIZATION  2010    CARDIAC PACEMAKER PLACEMENT      CHOLECYSTECTOMY      HAND SURGERY Right     thumb    TONSILLECTOMY      TRANSESOPHAGEAL ECHOCARDIOGRAM  11-        Medications Prior to Admission:     Prior to Admission medications    Medication Sig Start Date End Date Taking?  Authorizing Provider   spironolactone (ALDACTONE) 25 MG tablet Take 25 mg by mouth 2 times daily   Yes Historical Provider, MD   famotidine (PEPCID) 40 MG tablet Take 40 mg by mouth daily   Yes Historical Provider, MD   Cholecalciferol (VITAMIN D3) 50 MCG (2000 UT) CAPS Take by mouth   Yes Historical Provider, MD   azithromycin (ZITHROMAX) 500 MG injection Infuse 500 mg intravenously   Yes Historical Provider, MD   azithromycin (ZITHROMAX) 250 MG tablet Take 250 mg by mouth daily   Yes Historical Provider, MD   rivastigmine (EXELON) 4.6 MG/24HR Place 1 patch onto the skin daily   Yes Historical Provider, MD   budesonide (PULMICORT) 0.5 MG/2ML nebulizer suspension Take 1 ampule by nebulization 2 times daily   Yes Historical Provider, MD   ipratropium (ATROVENT) 0.02 % nebulizer solution Take 0.5 mg by nebulization 4 times daily   Yes Historical Provider, MD   albuterol (PROVENTIL) (2.5 MG/3ML) 0.083% nebulizer solution Take 2.5 mg by nebulization every 6 hours as needed for Wheezing   Yes Historical Provider, MD   HYDROcodone-acetaminophen (NORCO)  MG per tablet Take 1 tablet by mouth every 4 hours. Yes Historical Provider, MD   finasteride (PROSCAR) 5 MG tablet Take 5 mg by mouth daily. Yes Historical Provider, MD   Loratadine 10 MG CAPS Take 1 capsule by mouth daily. Yes Historical Provider, MD   gabapentin (NEURONTIN) 600 MG tablet Take 1,200 mg by mouth 3 times daily. Yes Historical Provider, MD   busPIRone (BUSPAR) 15 MG tablet Take 15 mg by mouth daily. Yes Historical Provider, MD   DULoxetine (CYMBALTA) 60 MG capsule Take 60 mg by mouth daily. Yes Historical Provider, MD   QUEtiapine (SEROQUEL) 25 MG tablet Take 50 mg by mouth nightly. Yes Historical Provider, MD   diazepam (VALIUM) 10 MG tablet Take 10 mg by mouth 4 times daily. Yes Historical Provider, MD   OxyCODONE HCl ER (OXYCONTIN) 60 MG T12A Take 1 tablet by mouth 3 times daily. Yes Historical Provider, MD   rivaroxaban (XARELTO) 20 MG TABS tablet Take 1 tablet by mouth daily (with breakfast). 14   Marcelle Bourne MD   midodrine (PROAMATINE) 2.5 MG tablet Take 2.5 mg by mouth 3 times daily as needed. Takes one or 2 tabs 3x day    Historical Provider, MD        Allergies:     Latex, Bee venom, and Prednisone    Social History:     Tobacco:    reports that he has been smoking. He does not have any smokeless tobacco history on file. Alcohol:      reports no history of alcohol use. Drug Use:  has no history on file for drug use. Family History:     History reviewed. No pertinent family history. Review of Systems:     Positive and Negative as described in HPI. Review of Systems   Unable to perform ROS: Mental status change       Physical Exam:   /62   Pulse 93   Temp 98.4 °F (36.9 °C) (Oral)   Resp 11   Ht 5' 11\" (1.803 m)   Wt 265 lb (120.2 kg)   SpO2 95%   BMI 36.96 kg/m²   Temp (24hrs), Av.4 °F (36.9 °C), Min:98.4 °F (36.9 °C), Max:98.4 °F (36.9 °C)    No results for input(s): POCGLU in the last 72 hours.   No intake or output data in the 24 hours ending 01/09/22 0309    Physical Exam  Constitutional:       General: He is not in acute distress. Appearance: He is not toxic-appearing. HENT:      Right Ear: External ear normal.      Left Ear: External ear normal.      Mouth/Throat:      Mouth: Mucous membranes are dry. Eyes:      General:         Right eye: No discharge. Left eye: No discharge. Conjunctiva/sclera: Conjunctivae normal.   Cardiovascular:      Rate and Rhythm: Normal rate and regular rhythm. Pulmonary:      Effort: No respiratory distress. Breath sounds: Normal breath sounds. Abdominal:      Palpations: Abdomen is soft. Tenderness: There is no abdominal tenderness. Skin:     General: Skin is warm and dry. Neurological:      Mental Status: He is disoriented.       Comments: Restless         Investigations:      Laboratory Testing:  Recent Results (from the past 24 hour(s))   Ammonia    Collection Time: 01/08/22  4:00 PM   Result Value Ref Range    Ammonia 20 16 - 60 umol/L   Basic Metabolic Panel    Collection Time: 01/08/22  4:00 PM   Result Value Ref Range    Glucose 92 70 - 99 mg/dL    BUN 23 8 - 23 mg/dL    CREATININE 3.09 (H) 0.70 - 1.20 mg/dL    Bun/Cre Ratio 7 (L) 9 - 20    Calcium 8.7 8.6 - 10.4 mg/dL    Sodium 130 (L) 135 - 144 mmol/L    Potassium 3.9 3.7 - 5.3 mmol/L    Chloride 89 (L) 98 - 107 mmol/L    CO2 26 20 - 31 mmol/L    Anion Gap 15 9 - 17 mmol/L    GFR Non-African American 21 (L) >60 mL/min    GFR  25 (L) >60 mL/min    GFR Comment          GFR Staging NOT REPORTED    CBC Auto Differential    Collection Time: 01/08/22  4:00 PM   Result Value Ref Range    WBC 6.2 3.5 - 11.3 k/uL    RBC 4.69 4.21 - 5.77 m/uL    Hemoglobin 12.6 (L) 13.0 - 17.0 g/dL    Hematocrit 40.4 (L) 40.7 - 50.3 %    MCV 86.1 82.6 - 102.9 fL    MCH 26.9 25.2 - 33.5 pg    MCHC 31.2 28.4 - 34.8 g/dL    RDW 15.2 (H) 11.8 - 14.4 %    Platelets 164 487 - 665 k/uL    MPV 8.9 8.1 - 13.5 fL    NRBC Automated 0.0 0.0 per 100 WBC    Differential Type NOT REPORTED     WBC Morphology NOT REPORTED     RBC Morphology NOT REPORTED     Platelet Estimate NOT REPORTED     Seg Neutrophils 83 (H) 36 - 65 %    Lymphocytes 7 (L) 24 - 43 %    Monocytes 8 3 - 12 %    Eosinophils % 0 (L) 1 - 4 %    Basophils 1 0 - 2 %    Immature Granulocytes 1 (H) 0 %    Segs Absolute 5.15 1.50 - 8.10 k/uL    Absolute Lymph # 0.43 (L) 1.10 - 3.70 k/uL    Absolute Mono # 0.50 0.10 - 1.20 k/uL    Absolute Eos # 0.00 0.00 - 0.44 k/uL    Basophils Absolute 0.06 0.00 - 0.20 k/uL    Absolute Immature Granulocyte 0.06 0.00 - 0.30 k/uL   TOX SCR, BLD, ED    Collection Time: 01/08/22  4:00 PM   Result Value Ref Range    Acetaminophen Level <10 (L) 10 - 30 ug/mL    Ethanol <10 <10 mg/dL    Ethanol percent <7.005 <8.392 %    Salicylate Lvl <1 (L) 3 - 10 mg/dL    Toxic Tricyclic Sc,Blood NEGATIVE NEGATIVE   Hepatic Function Panel    Collection Time: 01/08/22  4:00 PM   Result Value Ref Range    Albumin 3.8 3.5 - 5.2 g/dL    Alkaline Phosphatase 62 40 - 129 U/L    ALT 46 (H) 5 - 41 U/L    AST 60 (H) <40 U/L    Total Bilirubin 0.45 0.3 - 1.2 mg/dL    Bilirubin, Direct 0.21 <0.31 mg/dL    Bilirubin, Indirect 0.24 0.00 - 1.00 mg/dL    Total Protein 6.8 6.4 - 8.3 g/dL    Globulin NOT REPORTED 1.5 - 3.8 g/dL    Albumin/Globulin Ratio NOT REPORTED 1.0 - 2.5   Magnesium    Collection Time: 01/08/22  4:00 PM   Result Value Ref Range    Magnesium 2.0 1.6 - 2.6 mg/dL   Protime-INR    Collection Time: 01/08/22  4:00 PM   Result Value Ref Range    Protime 15.5 (H) 11.5 - 14.2 sec    INR 1.2    APTT    Collection Time: 01/08/22  4:00 PM   Result Value Ref Range    PTT 36.6 (H) 23.9 - 33.8 sec   Troponin    Collection Time: 01/08/22  4:00 PM   Result Value Ref Range    Troponin, High Sensitivity 69 (HH) 0 - 22 ng/L    Troponin T NOT REPORTED <0.03 ng/mL    Troponin Interp NOT REPORTED    Lactic Acid    Collection Time: 01/08/22  4:00 PM   Result Value Ref Range Lactic Acid 1.7 0.5 - 2.2 mmol/L   COVID-19, Rapid    Collection Time: 01/08/22  4:02 PM    Specimen: Nasopharyngeal Swab   Result Value Ref Range    Specimen Description . NASOPHARYNGEAL SWAB     SARS-CoV-2, Rapid DETECTED (A) Not Detected   Venous Blood Gas, POC    Collection Time: 01/08/22  4:23 PM   Result Value Ref Range    pH, [place] 7.249 (L) 7.320 - 7.430    pCO2, [place] 61.9 (H) 41.0 - 51.0 mm Hg    pO2, [place] 29.1 (L) 30.0 - 50.0 mm Hg    HCO3, Venous 27.1 22.0 - 29.0 mmol/L    Total CO2, Venous NOT REPORTED 23.0 - 30.0 mmol/L    Negative Base Excess, [place] 2 0.0 - 2.0    Positive Base Excess, [place] NOT REPORTED 0.0 - 3.0    O2 Sat, [place] 44 (L) 60.0 - 85.0 %    O2 Device/Flow/% NOT REPORTED     Edgardo Test NOT REPORTED     Sample Site NOT REPORTED     Mode NOT REPORTED     FIO2 NOT REPORTED     Pt Temp NOT REPORTED     POC pH Temp NOT REPORTED     POC pCO2 Temp NOT REPORTED mm Hg    POC pO2 Temp NOT REPORTED mm Hg   Notification Panel, POC    Collection Time: 01/08/22  4:23 PM   Result Value Ref Range    Action Notifyphysician     NOTIFY jeremiah     READ BACK Yes     Date/Time 01/08/202216:29:00    Troponin    Collection Time: 01/08/22  6:00 PM   Result Value Ref Range    Troponin, High Sensitivity 77 (HH) 0 - 22 ng/L    Troponin T NOT REPORTED <0.03 ng/mL    Troponin Interp NOT REPORTED    SPECIMEN REJECTION    Collection Time: 01/08/22  6:00 PM   Result Value Ref Range    Specimen Source B     Ordered Test LAC     Reason for Rejection       Unable to perform testing: Specimen collected in wrong container.    - NOT REPORTED    Lactic Acid    Collection Time: 01/08/22  7:00 PM   Result Value Ref Range    Lactic Acid 1.4 0.5 - 2.2 mmol/L   EKG 12 Lead    Collection Time: 01/08/22  7:55 PM   Result Value Ref Range    Ventricular Rate 86 BPM    Atrial Rate 86 BPM    P-R Interval 118 ms    QRS Duration 100 ms    Q-T Interval 380 ms    QTc Calculation (Bazett) 454 ms    P Axis 27 degrees    R Axis 11 degrees    T Axis 38 degrees   Arterial Blood Gas, POC    Collection Time: 01/09/22 12:03 AM   Result Value Ref Range    POC pH 7.377 7.350 - 7.450    POC pCO2 42.2 35.0 - 48.0 mm Hg    POC PO2 69.7 (L) 83.0 - 108.0 mm Hg    POC HCO3 24.8 21.0 - 28.0 mmol/L    TCO2 (calc), Art NOT REPORTED 22.0 - 29.0 mmol/L    Negative Base Excess, Art NOT REPORTED 0.0 - 2.0    Positive Base Excess, Art 0 0.0 - 3.0    POC O2 SAT 93 (L) 94.0 - 98.0 %    O2 Device/Flow/% Cannula     Edgardo Test POSITIVE     Sample Site Right Brachial Artery     Mode NOT REPORTED     FIO2 2.0     Pt Temp NOT REPORTED     POC pH Temp NOT REPORTED     POC pCO2 Temp NOT REPORTED mm Hg    POC pO2 Temp NOT REPORTED mm Hg       Imaging/Diagnostics:  CT HEAD WO CONTRAST    Result Date: 1/8/2022  No acute intracranial abnormality. RECOMMENDATIONS: Unavailable     XR CHEST PORTABLE    Result Date: 1/8/2022  Bilateral lower lung field reticulonodular prominence which is nonspecific and may represent bronchiolitis, fibrosis, COVID-19 pneumonia or other infectious/inflammatory process.        Assessment :      Hospital Problems           Last Modified POA    * (Principal) COVID-19 1/9/2022 Yes    AMS (altered mental status) 1/9/2022 Yes    COPD without exacerbation ([address] 1/9/2022 Yes    A-fib ([address] 1/9/2022 Yes    Gastroesophageal reflux disease without esophagitis 1/9/2022 Yes    Neuropathy 1/9/2022 Yes    JO (acute kidney injury) ([address] 1/9/2022 Yes    Elevated troponin 1/9/2022 Yes          Plan:     Patient status inpatient in the  Medical ICU    Inpatient admission with droplet precaution  Consults to nephrology and pulmonology  Telemetry  Supplemental oxygen as needed  Decadron  Await inflammatory biomarkers  IV hydration  Avoid nephrotoxic agents  Retroperitoneal ultrasound  Bladder scan  Intake and output  Monitor labs  Replace electrolytes as needed  DVT prophylaxis    Consultations:   IP CONSULT TO HOSPITALIST  IP CONSULT TO NEPHROLOGY  IP CONSULT TO PULMONOLOGY    Patient is admitted as inpatient status because of co-morbidities listed above, severity of signs and symptoms as outlined, requirement for current medical therapies and most importantly because of direct risk to patient if care not provided in a hospital setting. Expected length of stay > 48 hours.     PATRICK TAPIA - CNP  1/9/2022  3:09 AM    Copy sent to Dr. Víctor Nazario MD

## 2022-01-09 NOTE — PROGRESS NOTES
[facility]  Office: [address], DO, Kavita Toma, DO, Madi Osmanamanda, DO, Gautam Curtis Blood, DO, Omega Mcguire MD, Fabian Parrish MD, Zita Elliott MD, Pawan Ruiz MD, Frandy Askew MD, Eleonora Collazo MD, Santiago Briones MD, Paula Gold, DO, Dayna Babb, DO, Cesia Ferreira MD,  Lexie Rabago, DO, Theron Pulliam MD, Elyssa Casas MD, Alex Lucero MD, Serenity Chaves MD, Mitchel Perry MD, Luz Marina Romero MD, Paulo Vance MD, Pricilla Fothergill, [facility], [facility], [facility], Benji Hagen, CNP, Lisa Webb, CNS, Ingrid Ashraf, CNP, Amador Mckenzie, CNP, Jose Amin, CNP, Chloe Cartagena, CNP, Sandip Fuller, CNP, Servando Douglas PA-C, Eli Olivarez, [facility], Francesca Salas [facility], Venkatesh Nguyen, CNP, Penny Gann, CNP, Kaitlyn Vogt CNP, Sylwia York, CNP, Oziel Casey CNP, [facility]    Progress Note    1/9/2022    3:53 PM    Name:   Tara Scott  MRN:     [id number]     Acct:      [de-identified]   Room:   [address] Day:  1  Admit Date:  1/8/2022  3:33 PM    PCP:   Jovita Garcia MD  Code Status:  Full Code    Subjective:     C/C:   Chief Complaint   Patient presents with   Kasey Current Altered Mental Status     Interval History Status: not changed. Patient remains restless and agitated throughout the day, frequently calling out. Denies chest pain, shortness of breath, nausea or vomiting or other acute complaints at this time    Brief History: This is a 69-year-old male that presents with altered mental status. He has been found to have encephalopathy as well as COVID-19 infection. He is admitted for further evaluation and management. He has required oxygen but frequently pulls off. He states he is not vaccinated as he does not believe he should have the vaccine for 4 to 5 years according to the news story that he had seen on television.     Review of Systems:     Cannot be obtained due to confusion with flight of ideas    Medications: Allergies: Allergies   Allergen Reactions    Latex     Bee Venom     Prednisone Other (See Comments)     Mood swings       Current Meds:   Scheduled Meds:    ziprasidone  20 mg IntraMUSCular Once    And    sterile water  1.2 mL IntraMUSCular Once    heparin (porcine)  5,000 Units SubCUTAneous 3 times per day    busPIRone  15 mg Oral Daily    DULoxetine  60 mg Oral Daily    famotidine  40 mg Oral Daily    finasteride  5 mg Oral Daily    cetirizine  10 mg Oral Daily    QUEtiapine  50 mg Oral Nightly    rivastigmine  1 patch TransDERmal Daily    [Held by provider] spironolactone  25 mg Oral BID    sodium chloride flush  5-40 mL IntraVENous 2 times per day    dexamethasone  6 mg IntraVENous Q24H    Vitamin D  2,000 Units Oral Daily     Continuous Infusions:    sodium chloride 75 mL/hr at 22 0649    sodium chloride       PRN Meds: LORazepam, sodium chloride flush, sodium chloride, ondansetron **OR** ondansetron, magnesium hydroxide, acetaminophen **OR** acetaminophen, guaiFENesin-dextromethorphan    Data:     Past Medical History:   has a past medical history of Anxiety, Arthritis, Atrial fib/flutter, transient, Bradycardia, COPD (chronic obstructive pulmonary disease) ([facility] [address] Depression, GERD (gastroesophageal reflux disease), Neuropathy, Pneumonia, and Syncope. Social History:   reports that he has been smoking. He does not have any smokeless tobacco history on file. He reports that he does not drink alcohol. Family History: History reviewed. No pertinent family history. Vitals:  /67   Pulse 85   Temp 97.7 °F (36.5 °C) (Oral)   Resp 15   Ht 5' 11\" (1.803 m)   Wt 265 lb (120.2 kg)   SpO2 93%   BMI 36.96 kg/m²   Temp (24hrs), Av.3 °F (36.8 °C), Min:97.7 °F (36.5 °C), Max:98.8 °F (37.1 °C)    No results for input(s): POCGLU in the last 72 hours. I/O (24Hr):     Intake/Output Summary (Last 24 hours) at 2022 8802  Last data filed PORTABLE    Result Date: 1/8/2022  Bilateral lower lung field reticulonodular prominence which is nonspecific and may represent bronchiolitis, fibrosis, COVID-19 pneumonia or other infectious/inflammatory process. Physical Examination:        General appearance:  alert, cooperative and no distress  Mental Status:  oriented to person, place and time and normal affect  Lungs:  clear to auscultation bilaterally, normal effort  Heart:  regular rate and rhythm, no murmur  Abdomen:  soft, nontender, nondistended, normal bowel sounds, no masses, hepatomegaly, splenomegaly  Extremities:  no edema, redness, tenderness in the calves  Skin:  no gross lesions, rashes, induration    Assessment:        Hospital Problems           Last Modified POA    * (Principal) Pneumonia due to COVID-19 virus 1/9/2022 Yes    AMS (altered mental status) 1/9/2022 Yes    COPD without exacerbation ([facility] [address] 1/9/2022 Yes    A-fib ([facility] [address] 1/9/2022 Yes    Gastroesophageal reflux disease without esophagitis 1/9/2022 Yes    Neuropathy 1/9/2022 Yes    JO (acute kidney injury) ([facility] [address] 1/9/2022 Yes    Elevated troponin 1/9/2022 Yes    Encephalopathy due to COVID-19 virus 1/9/2022 Yes          Plan:        1. Geodon as needed. 2. Decadron as ordered. 3. GI and DVT prophylaxis  4. Trend labs  5. Avoid nephrotoxic agents  6. Correct electrolytes as needed  7. IV hydration  8. Continue home Exelon  9. Lorazepam as needed for agitation. If ineffective may need Precedex drip.   10. Trend troponin, trending downward, likely elevation due to dyspnea and JO    Jonathon Lyons DO  1/9/2022  3:53 PM

## 2022-01-09 NOTE — PLAN OF CARE
Problem: Pain:  Goal: Pain level will decrease  Description: Pain level will decrease  Outcome: Ongoing   Pt denied pain this shift. Will continue to monitor. Problem: Skin Integrity:  Goal: Will show no infection signs and symptoms  Description: Will show no infection signs and symptoms  Outcome: Ongoing   NO new s/s of infection noted this shift. Problem: Skin Integrity:  Goal: Absence of new skin breakdown  Description: Absence of new skin breakdown  Outcome: Ongoing   No new altered skin noted this shift. Problem: Falls - Risk of:  Goal: Will remain free from falls  Description: Will remain free from falls  Outcome: Ongoing   No falls noted this shift. Problem: Falls - Risk of:  Goal: Absence of physical injury  Description: Absence of physical injury  Outcome: Ongoing   NO injury noted this shift.

## 2022-01-09 NOTE — ED NOTES
Pt requiring full-time 1:1 attention. Pt is non-compliant, threatening at times , belligerent, yelling. Not responding as expected to calming medications given. Pt has taken all EKG leads, BP cuff and SPO2 off, grasping tightly, wouldn't let go. Removed his external urine . Pt has flight of ideas,   Constant talking. Trying to get out of bed by throwing his legs over the side of the bed. Pt does remain on O2 NC 2L and SPO2 placed on his forehead. New urine  applied.      Idalia Najjar, RN  01/09/22 9723

## 2022-01-09 NOTE — ED NOTES
Pt removed NC 2L. Pt swung at writer and hit hand.   Pt legs draped over the side, refusing to get legs back in bed, security called     Vanessa Gordon RN  01/09/22 2094

## 2022-01-10 NOTE — PROGRESS NOTES
Physical Therapy  DATE: 1/10/2022    NAME: Rosie Mayorga  MRN: [id number]   : 1960    Patient not seen this date for Physical Therapy due to:      [x] Cancel by RN or physician due to:  BRANDON Arambula reporting pt is still combative this morning, put in restraints and given geodon. Pt not appropriate for therapy eval at this time. BRANDON Arambula requesting ck back tomorrow. PT will continue to follow and ck back as able. [] Hemodialysis    [] Critical Lab Value Level     [] Blood transfusion in progress    [] Acute or unstable cardiovascular status   _MAP < 55 or more than >115  _HR < 40 or > 130    [] Acute or unstable pulmonary status   -FiO2 > 60%   _RR < 5 or >40    _O2 sats < 85%    [] Strict Bedrest    [] Off Unit for surgery or procedure    [] Off Unit for testing       [] Pending imaging to R/O fracture    [] Refusal by Patient      [] Other      [] PT being discontinued at this time. Patient independent. No further needs. [] PT being discontinued at this time as the patient has been transferred to hospice care. No further needs.       Annabelle Herrera, PT

## 2022-01-10 NOTE — PROGRESS NOTES
Occupational [address]  Occupational Therapy Not Seen Note    Patient not available for Occupational Therapy due to:    [] Testing:    [] Hemodialysis    [x] Cancelled by RN: 1/10: BRANDON Melgar reporting pt is still combative this morning, put in restraints and given geodon. Pt not appropriate for therapy eval at this time. BRANDON Melgar requesting ck back tomorrow. OT will continue to follow and ck back as able.     []Refusal by Patient:    [] Surgery:     [] Intubation:     [] Pain Medication:    [] Sedation:     [] Spine Precautions :    [] Medical Instability:    [] Other:        Greg Dominguez, OT

## 2022-01-10 NOTE — PROGRESS NOTES
Dr. Vibha Oglesby updated patient confused and combative this morning. One on one in place. Patient yelling out and trying to kick staff. See order for EKG and Geodon.

## 2022-01-10 NOTE — PROGRESS NOTES
Updated Danii Cuevas, wife of patient transfer to PCU and room number as well as unit phone number in person.

## 2022-01-10 NOTE — PROGRESS NOTES
Pulmonary Critical Care Progress Note  PATRICK Salazar-BRENDEN/Alejandro Diana MD     Patient seen for the follow up of acute hypoxic respiratory insufficiency, Pneumonia due to COVID-19 virus     Subjective:  He is resting in bed, remains confused/encephalopathic, combative at times, currently in restraints. He is sleepy, not answering many questions for me at this time. He is on oxygen at 4 L nasal cannula, does not appear to be in any distress. Examination:  Vitals: BP (!) 147/96   Pulse 85   Temp 97.2 °F (36.2 °C) (Axillary)   Resp 18   Ht 5' 11\" (1.803 m)   Wt 265 lb 11.2 oz (120.5 kg)   SpO2 92%   BMI 37.06 kg/m²   General appearance: Sleepy, currently resting in bed, cooperative with exam  Neck: No JVD  Lungs: Moderate to decreased air exchange, no crackles or wheezing  Heart: regular rate and rhythm, S1, S2 normal, no gallop  Abdomen: Soft, non tender, + BS  Extremities: no cyanosis or clubbing.  No significant edema    LABs:  CBC:   Recent Labs     01/08/22  1600 01/09/22 0403   WBC 6.2 4.2   HGB 12.6* 11.9*   HCT 40.4* 37.6*    143     BMP:   Recent Labs     01/09/22  0403 01/10/22  0523   * 137   K 4.3 4.5   CO2 22 23   BUN 23 26*   CREATININE 2.31* 1.33*   LABGLOM 29* 54*   GLUCOSE 117* 115*     PT/INR:   Recent Labs     01/08/22  1600 01/09/22 0403   PROTIME 15.5* 16.1*   INR 1.2 1.3     APTT:  Recent Labs     01/08/22  1600 01/09/22 0403   APTT 36.6* 38.1*     LIVER PROFILE:  Recent Labs     01/08/22  1600 01/09/22 0403   AST 60* 82*   ALT 46* 46*   LABALBU 3.8 3.4*     ABG:  Lab Results   Component Value Date    NAU2OEF NOT REPORTED 01/09/2022    FIO2 NOT REPORTED 01/09/2022       Lab Results   Component Value Date    POCPH 7.414 01/09/2022    POCPCO2 40.9 01/09/2022    POCPO2 52.8 01/09/2022    POCHCO3 26.1 01/09/2022    NBEA NOT REPORTED 01/09/2022    PBEA 1 01/09/2022    LGF7SYO NOT REPORTED 01/09/2022    YCVF3NCJ 87 01/09/2022    FIO2 NOT REPORTED 01/09/2022 Radiology:  X-ray chest 1/8/2022      Impression:  · Acute hypoxic respiratory insufficiency  · COVID-19 infection  · Altered mental status/encephalopathy  · Acute kidney injury  · Suspected obstructive sleep apnea/Obesity  · A. fib, anxiety, arthritis, GERD    Recommendations:  · Airborne isolation  · Oxygen via nasal cannula to keep SPO2 90% or greater  · BiPAP support as necessary  · Incentive spirometry every hour while wake  · Prone as tolerated  · Decadron  · IV fluids per nephrology  · Monitor mental status  · X-ray chest in am  · Labs: CBC and BMP in am  · DVT prophylaxis with subcu heparin  · Above assessment and plan will be reviewed with Dr. Luis Encinas.   Patient plan will be finalized following review by Dr. Luis Encinas  · Will follow with you    PATRICK Sellers-CNP   Pulmonary Critical Care and Sleep Medicine,  Patient seen under the supervision of Matthew Baez MD, [facility]

## 2022-01-10 NOTE — PROGRESS NOTES
Wife Rigoberto Frankel called and updated on patient. Aware patient is in bilateral wrist and ankle restraints added this afternoon. Still confused and combative at times. Also aware patient has been medicated with geodon and Ativan today. One on one continued. All questions answered. Wife was thankful for the update.

## 2022-01-10 NOTE — PLAN OF CARE
Problem: Non-Violent Restraints  Goal: Removal from restraints as soon as assessed to be safe  1/10/2022 0104 by Eric Lopez RN  Outcome: Ongoing     Problem: Non-Violent Restraints  Goal: No harm/injury to patient while restraints in use  1/10/2022 0104 by Eric Lopez RN  Outcome: Ongoing     Problem: Non-Violent Restraints  Goal: Patient's dignity will be maintained  1/10/2022 0104 by Eric Lopez RN  Outcome: Ongoing     Problem: Pain:  Goal: Pain level will decrease  Description: Pain level will decrease  1/10/2022 0104 by Eric Lopez RN  Outcome: Ongoing     Problem: Pain:  Goal: Control of acute pain  Description: Control of acute pain  1/10/2022 0104 by Eric Lopez RN  Outcome: Ongoing     Problem: Pain:  Goal: Control of chronic pain  Description: Control of chronic pain  1/10/2022 0104 by Eric Lopez RN  Outcome: Ongoing     Problem: Skin Integrity:  Goal: Will show no infection signs and symptoms  Description: Will show no infection signs and symptoms  1/10/2022 0104 by Eric Lopez RN  Outcome: Ongoing     Problem: Skin Integrity:  Goal: Absence of new skin breakdown  Description: Absence of new skin breakdown  1/10/2022 0104 by Eric Lopez RN  Outcome: Ongoing     Problem: Falls - Risk of:  Goal: Will remain free from falls  Description: Will remain free from falls  1/10/2022 0104 by Eric Lopez RN  Outcome: Ongoing     Problem: Falls - Risk of:  Goal: Absence of physical injury  Description: Absence of physical injury  1/10/2022 0104 by Eric Lopez RN  Outcome: Ongoing     Problem: Airway Clearance - Ineffective  Goal: Achieve or maintain patent airway  Outcome: Ongoing     Problem: Gas Exchange - Impaired  Goal: Absence of hypoxia  Outcome: Ongoing     Problem: Gas Exchange - Impaired  Goal: Promote optimal lung function  Outcome: Ongoing     Problem: Breathing Pattern - Ineffective  Goal: Ability to achieve and maintain a regular respiratory rate  Outcome: Ongoing     Problem:  Body Temperature -  Risk of, Imbalanced  Goal: Ability to maintain a body temperature within defined limits  Outcome: Ongoing     Problem: Body Temperature -  Risk of, Imbalanced  Goal: Will regain or maintain usual level of consciousness  Outcome: Ongoing     Problem:  Body Temperature -  Risk of, Imbalanced  Goal: Complications related to the disease process, condition or treatment will be avoided or minimized  Outcome: Ongoing     Problem: Isolation Precautions - Risk of Spread of Infection  Goal: Prevent transmission of infection  Outcome: Ongoing     Problem: Nutrition Deficits  Goal: Optimize nutritional status  Outcome: Ongoing     Problem: Risk for Fluid Volume Deficit  Goal: Maintain normal heart rhythm  Outcome: Ongoing     Problem: Risk for Fluid Volume Deficit  Goal: Maintain absence of muscle cramping  Outcome: Ongoing     Problem: Risk for Fluid Volume Deficit  Goal: Maintain normal serum potassium, sodium, calcium, phosphorus, and pH  Outcome: Ongoing     Problem: Loneliness or Risk for Loneliness  Goal: Demonstrate positive use of time alone when socialization is not possible  Outcome: Ongoing     Problem: Fatigue  Goal: Verbalize increase energy and improved vitality  Outcome: Ongoing     Problem: Patient Education: Go to Patient Education Activity  Goal: Patient/Family Education  Outcome: Ongoing     Problem: Confusion - Acute:  Goal: Absence of continued neurological deterioration signs and symptoms  Description: Absence of continued neurological deterioration signs and symptoms  Outcome: Ongoing     Problem: Confusion - Acute:  Goal: Mental status will be restored to baseline  Description: Mental status will be restored to baseline  Outcome: Ongoing     Problem: Discharge Planning:  Goal: Ability to perform activities of daily living will improve  Description: Ability to perform activities of daily living will improve  Outcome: Ongoing     Problem: Discharge Planning:  Goal: Participates in care planning  Description: Participates in care planning  Outcome: Ongoing     Problem: Injury - Risk of, Physical Injury:  Goal: Will remain free from falls  Description: Will remain free from falls  1/10/2022 0104 by Maryellen Thapa RN  Outcome: Ongoing     Problem: Injury - Risk of, Physical Injury:  Goal: Absence of physical injury  Description: Absence of physical injury  1/10/2022 0104 by Maryellen Thapa RN  Outcome: Ongoing     Problem: Mood - Altered:  Goal: Mood stable  Description: Mood stable  Outcome: Ongoing     Problem: Mood - Altered:  Goal: Absence of abusive behavior  Description: Absence of abusive behavior  Outcome: Ongoing     Problem: Psychomotor Activity - Altered:  Goal: Absence of psychomotor disturbance signs and symptoms  Description: Absence of psychomotor disturbance signs and symptoms  Outcome: Ongoing

## 2022-01-10 NOTE — CARE COORDINATION
Case Management Initial Discharge Plan  Mikki Schultz,             Spoke with:spouse/SO to discuss discharge plans. Information verified: address, contacts, phone number, , insurance Yes  PCP: Steffany Spicer MD  Date of last visit: 2021 per [employer] Provider: Moise Nettles Medicare [employer]vantage    Discharge Planning    Living Arrangements:  Spouse/Significant Other   Support Systems:  Spouse/Significant Other    Home has 1 story  2 stairs to climb to get into front door, 0 stairs to climb to reach second floor  Location of bedroom/bathroom in home Main Floor    Patient able to perform ADL's:Independent with a cane    Current Services (outpatient & in home) None  DME equipment: O2 at 2-3L continuous, shower eat, walker, cane, & w/c  DME provider: [facility]    Pharmacy: [address] [place] [address] Needed:  N/A    Patient agreeable to home care: Yes  Freedom of choice provided:  yes    Prior SNF/Rehab Placement and Facility: Yes  Agreeable to SNF/Rehab: Unsure at this time  Freedom of choice provided: n/a   Evaluation: n/a    Expected Discharge date:     Patient expects to be discharged to: Follow Up Appointment: Best Day/ Time:      Transportation provider: Spouse  Transportation arrangements needed for discharge: No    Readmission Risk              Risk of Unplanned Readmission:  24             Does patient have a readmission risk score greater than 14?: Yes  If yes, follow-up appointment must be made within 7 days of discharge. Goal of Care:       Discharge Plan:   Spoke with patient's wife, Tri Irizarry, at [phone] to discuss d/c plans. Patient is admitted with pneumonia due to [address], but also had a fall at home prior to arrival in the ED. Patient is currently confused, combative, and yelling out - encephalopathy with an unclear etiology.    Wife reports patient has underlying dementia, but has never seen behavior like he is demonstrating currently. Currently patient is in restraints. Requiring 4L NC at this time, home O2 is through [facility] [place] and patient uses 2-3L continuous. Xarelto is a home medication for afib. Currently receiving IV decadron. US of retroperitoneal of kidneys and urinary bladder- nonvisualization of the right kidney. Limited exam as described. Otherwise unremarkable exam.      Patient lives with spouse. Prior to admission he was independent with a cane, and does not drive. Spouse is open to [place] [address] and no preference on agency, and her goal is to get him back home. Patient has been to a SNF before and wife reports he was not treated very well, and never wants to return to one-spouse would like to honor that wish if at all possible, but she is willing to revisit if necessary. 4L NC  Tested + 1/8/22  Intermediate     Consults: Nephro & Pulm    Continue to follow for needs pending overall progress.            Electronically signed by Frandy Negrete RN on 1/10/22 at 9:48 AM EST

## 2022-01-10 NOTE — FLOWSHEET NOTE
Patient in COVID-19 isolation; receives Sacrament of the [address] (anointing) remotely from [employer] .    [facility] will follow as needed. (writer charting for [facility] .)     29/34/45 0292   Encounter Summary   Services provided to: Patient   Referral/Consult From: Rounding   Continue Visiting   (1/10/22 COVID/anointed)   Complexity of Encounter Low   Length of Encounter 15 minutes   Routine   Type Initial   Assessment Unable to respond   Sacraments   Sacrament of Sick-Anointing Anointed  (1/10/22 Fr. Anastasiia Faria)

## 2022-01-10 NOTE — PROGRESS NOTES
[facility]  Office: [address], DO, Esther Doll, DO, Shayla Norman, DO, [place] Boxer Blood, DO, Laure Castro MD, Leodan Cope MD, Jj Flood MD, Lis Arguelles MD, Ele Alcantar MD, Edna Olivera MD, Junior Dorsey MD, Ruth Ann Sun, DO, Mónica Fontenot, DO, Shahla Aguilar MD,  Axel Monte, DO, Aysha Koenig MD, Ofelia White MD, Marcos Dixon MD, Ventura Burton MD, Caroline Mosley MD, Daleen Lennox, MD, Darrian Norton MD, Edy Stinson [facility], [facility], [facility], Katya Ahuja, CNP, Michael Sherwood, CNS, Abbe Agustin, CNP, Michael Oropeza, CNP, Johanny Leiva, CNP, Nicci Gomez, CNP, Hildy Shone, [facility], [place] KATALINA Cole, Monserrat Wilson, [facility], Mike Chauhan, [facility], Nitza Bruner, CNP, Abdoul Bajwa, CNP, Luz Bermudez, CNP, Marquis Melendez, CNP, Florentino Marinelli, [facility], [facility]    Progress Note    1/10/2022    12:39 PM    Name:   Annamarie Riggs  MRN:     [id number]     Acct:      [de-identified]   Room:   [address] [place] [address] Day:  2  Admit Date:  1/8/2022  3:33 PM    PCP:   Roxanne Redd MD  Code Status:  Full Code    Subjective:     C/C:   Chief Complaint   Patient presents with   Lana Pro Altered Mental Status     Interval History Status: not changed. Patient remains restless, yelling out. Combative with staff. He is confused and cannot answer questions for me today. Brief History: This is a 55-year-old male that presents with altered mental status. He has been found to have encephalopathy as well as COVID-19 infection. He is admitted for further evaluation and management. He has required oxygen but frequently pulls off. He states he is not vaccinated as he does not believe he should have the vaccine for 4 to 5 years according to the news story that he had seen on television. Review of Systems:     Cannot be obtained due to confusion with flight of ideas    Medications:      Allergies: Allergies   Allergen Reactions    Latex     Bee Venom     Prednisone Other (See Comments)     Mood swings       Current Meds:   Scheduled Meds:    ziprasidone  20 mg IntraMUSCular Once    And    sterile water  1.2 mL IntraMUSCular Once    heparin (porcine)  5,000 Units SubCUTAneous 3 times per day    albuterol  2.5 mg Nebulization 4x daily    busPIRone  15 mg Oral Daily    DULoxetine  60 mg Oral Daily    famotidine  40 mg Oral Daily    finasteride  5 mg Oral Daily    cetirizine  10 mg Oral Daily    QUEtiapine  50 mg Oral Nightly    rivastigmine  1 patch TransDERmal Daily    [Held by provider] spironolactone  25 mg Oral BID    sodium chloride flush  5-40 mL IntraVENous 2 times per day    dexamethasone  6 mg IntraVENous Q24H    Vitamin D  2,000 Units Oral Daily     Continuous Infusions:    sodium chloride 50 mL/hr at 01/10/22 0412    sodium chloride       PRN Meds: LORazepam, sodium chloride flush, sodium chloride, ondansetron **OR** ondansetron, magnesium hydroxide, acetaminophen **OR** acetaminophen, guaiFENesin-dextromethorphan    Data:     Past Medical History:   has a past medical history of Anxiety, Arthritis, Atrial fib/flutter, transient, Bradycardia, COPD (chronic obstructive pulmonary disease) ([facility] [address] Dementia ([facility] [address] Depression, GERD (gastroesophageal reflux disease), Neuropathy, Parkinson's disease ([facility] [address] Pneumonia, and Syncope. Social History:   reports that he has quit smoking. His smoking use included cigarettes. He has never used smokeless tobacco. He reports that he does not drink alcohol and does not use drugs. Family History: History reviewed. No pertinent family history.     Vitals:  BP (!) 147/96   Pulse 85   Temp 97.2 °F (36.2 °C) (Axillary)   Resp 18   Ht 5' 11\" (1.803 m)   Wt 265 lb 11.2 oz (120.5 kg)   SpO2 90%   BMI 37.06 kg/m²   Temp (24hrs), Av.8 °F (36.6 °C), Min:97.2 °F (36.2 °C), Max:98.5 °F (36.9 °C)    No results for input(s): POCGLU in the last 72 hours.    I/O (24Hr): Intake/Output Summary (Last 24 hours) at 1/10/2022 1239  Last data filed at 1/9/2022 1442  Gross per 24 hour   Intake 553.66 ml   Output --   Net 553.66 ml       Labs:  Hematology:  Recent Labs     01/08/22 1600 01/09/22  0403 01/10/22  0523   WBC 6.2 4.2  --    RBC 4.69 4.44  --    HGB 12.6* 11.9*  --    HCT 40.4* 37.6*  --    MCV 86.1 84.7  --    MCH 26.9 26.8  --    MCHC 31.2 31.6  --    RDW 15.2* 14.7*  --     143  --    MPV 8.9 8.8  --    CRP  --  216.4* 100.7*   INR 1.2 1.3  --    DDIMER  --  2.31* 1.59*     Chemistry:  Recent Labs     01/08/22 1600 01/08/22 1600 01/08/22  1800 01/09/22  0403 01/09/22  1050 01/10/22  0523   *  --   --  134*  --  137   K 3.9  --   --  4.3  --  4.5   CL 89*  --   --  97*  --  100   CO2 26  --   --  22  --  23   GLUCOSE 92  --   --  117*  --  115*   BUN 23  --   --  23  --  26*   CREATININE 3.09*  --   --  2.31*  --  1.33*   MG 2.0  --   --   --   --   --    ANIONGAP 15  --   --  15  --  14   LABGLOM 21*  --   --  29*  --  54*   GFRAA 25*  --   --  35*  --  >60   CALCIUM 8.7  --   --  8.3*  --  8.5*   TROPHS 69*   < > 77* 38* 26*  --    MYOGLOBIN  --   --   --  1,115* 940*  --    LACTACIDWB  --   --   --  NOT REPORTED  --   --     < > = values in this interval not displayed.      Recent Labs     01/08/22 1600 01/09/22 0403 01/09/22  1610   PROT 6.8 6.6 6.4   LABALBU 3.8 3.4*  --    AST 60* 82*  --    ALT 46* 46*  --    LDH  --  366*  --    ALKPHOS 62 57  --    BILITOT 0.45 0.36  --    BILIDIR 0.21  --   --    AMMONIA 20  --   --      ABG:  Lab Results   Component Value Date    POCPH 7.414 01/09/2022    POCPCO2 40.9 01/09/2022    POCPO2 52.8 01/09/2022    POCHCO3 26.1 01/09/2022    NBEA NOT REPORTED 01/09/2022    PBEA 1 01/09/2022    HCW9IRK NOT REPORTED 01/09/2022    KIMF8RYG 87 01/09/2022    FIO2 NOT REPORTED 01/09/2022     Lab Results   Component Value Date/Time    SPECIAL NOT REPORTED 03/30/2012 06:02 PM     Lab Results   Component Value Date/Time    CULTURE NO GROWTH 03/30/2012 06:02 PM    CULTURE  03/30/2012 06:02 PM     Performed at [facility] [address] [facility], [place] 3 [phone]       Radiology:  CT HEAD WO CONTRAST    Result Date: 1/8/2022  No acute intracranial abnormality. RECOMMENDATIONS: Unavailable     XR CHEST PORTABLE    Result Date: 1/8/2022  Bilateral lower lung field reticulonodular prominence which is nonspecific and may represent bronchiolitis, fibrosis, COVID-19 pneumonia or other infectious/inflammatory process. Physical Examination:        General appearance:  alert, combative and no distress  Mental Status: Cannot be assessed due to orientation  Lungs: Coarse breath sounds bilaterally, normal effort  Heart:  regular rate and rhythm, no murmur  Abdomen:  soft, nontender, nondistended, normal bowel sounds, no masses, hepatomegaly, splenomegaly  Extremities:  no edema, redness, tenderness in the calves  Skin:  no gross lesions, rashes, induration    Assessment:        Hospital Problems           Last Modified POA    * (Principal) Pneumonia due to COVID-19 virus 1/9/2022 Yes    AMS (altered mental status) 1/9/2022 Yes    COPD without exacerbation ([facility] [address] 1/9/2022 Yes    A-fib ([facility] [address] 1/9/2022 Yes    Gastroesophageal reflux disease without esophagitis 1/9/2022 Yes    Neuropathy 1/9/2022 Yes    JO (acute kidney injury) ([facility] [address] 1/9/2022 Yes    Elevated troponin 1/9/2022 Yes    Encephalopathy due to COVID-19 virus 1/9/2022 Yes          Plan:        1. Increase Seroquel to twice daily  2. Decadron as ordered. Consider transition to oral in the next 24 hours if CRP continues to improve  3. Supplemental oxygen  4. GI and DVT prophylaxis  5. Trend labs, monitor inflammatory markers and correct electrolytes as needed  6. Avoid nephrotoxic agents, renal function improving  7. IV hydration per nephrology  8. Continue home Exelon  9. Increase lorazepam to 1 to 2 mg every 4 hours as needed  10.  Four point restraints for safety, may need violent restraints    Betty Ji DO  1/10/2022  12:39 PM

## 2022-01-10 NOTE — PROGRESS NOTES
NEPHROLOGY PROGRESS NOTE      SUBJECTIVE     On IV fluids at 50 mill an hour isotonic saline. Urine output decent but does not have indwelling Forman catheter in place. Diuretics on hold. Blood pressure stable. Continues to be encephalopathic stage. No clear etiology at this point of time. Renal function improving. OBJECTIVE     Vitals:    01/09/22 1621 01/09/22 1848 01/10/22 0445 01/10/22 0515   BP: 115/75 123/76  (!) 136/90   Pulse: 82 86  90   Resp: 20 18  18   Temp: 97.5 °F (36.4 °C) 98.1 °F (36.7 °C)  98.5 °F (36.9 °C)   TempSrc: Oral Oral  Axillary   SpO2: 94% 94%  90%   Weight:   265 lb 11.2 oz (120.5 kg)    Height:         24HR INTAKE/OUTPUT:      Intake/Output Summary (Last 24 hours) at 1/10/2022 0751  Last data filed at 1/9/2022 1442  Gross per 24 hour   Intake 703.66 ml   Output 550 ml   Net 153.66 ml       General appearance: Lethargic and confused  Respiratory::vesicular breath sounds,no wheeze/crackles  Cardiovascular:S1 S2 normal,no gallop or organic murmur. Abdomen:Non tender/non distended. Bowel sounds present  Extremities: No Cyanosis or Clubbing,Lower extremity edema  Neurological: Confused      MEDICATIONS     Scheduled Meds:    ziprasidone  20 mg IntraMUSCular Once    And    sterile water  1.2 mL IntraMUSCular Once    heparin (porcine)  5,000 Units SubCUTAneous 3 times per day    albuterol  2.5 mg Nebulization 4x daily    busPIRone  15 mg Oral Daily    DULoxetine  60 mg Oral Daily    famotidine  40 mg Oral Daily    finasteride  5 mg Oral Daily    cetirizine  10 mg Oral Daily    QUEtiapine  50 mg Oral Nightly    rivastigmine  1 patch TransDERmal Daily    [Held by provider] spironolactone  25 mg Oral BID    sodium chloride flush  5-40 mL IntraVENous 2 times per day    dexamethasone  6 mg IntraVENous Q24H    Vitamin D  2,000 Units Oral Daily     Continuous Infusions:    sodium chloride 50 mL/hr at 01/10/22 0412    sodium chloride       PRN Meds:  LORazepam, sodium chloride flush, sodium chloride, ondansetron **OR** ondansetron, magnesium hydroxide, acetaminophen **OR** acetaminophen, guaiFENesin-dextromethorphan  Home Meds:                Medications Prior to Admission: spironolactone (ALDACTONE) 25 MG tablet, Take 25 mg by mouth 2 times daily  famotidine (PEPCID) 40 MG tablet, Take 40 mg by mouth daily  Cholecalciferol (VITAMIN D3) 50 MCG (2000 UT) CAPS, Take 50 Units by mouth once a week   azithromycin (ZITHROMAX) 250 MG tablet, Take 250 mg by mouth daily  rivastigmine (EXELON) 4.6 MG/24HR, Place 1 patch onto the skin daily  budesonide (PULMICORT) 0.5 MG/2ML nebulizer suspension, Take 1 ampule by nebulization 2 times daily  ipratropium (ATROVENT) 0.02 % nebulizer solution, Take 0.5 mg by nebulization 4 times daily  albuterol (PROVENTIL) (2.5 MG/3ML) 0.083% nebulizer solution, Take 2.5 mg by nebulization every 6 hours as needed for Wheezing  HYDROcodone-acetaminophen (NORCO)  MG per tablet, Take 1 tablet by mouth every 4 hours. finasteride (PROSCAR) 5 MG tablet, Take 5 mg by mouth daily. Loratadine 10 MG CAPS, Take 1 capsule by mouth daily. gabapentin (NEURONTIN) 600 MG tablet, Take 1,200 mg by mouth 3 times daily. busPIRone (BUSPAR) 15 MG tablet, Take 15 mg by mouth daily. DULoxetine (CYMBALTA) 60 MG capsule, Take 60 mg by mouth daily. QUEtiapine (SEROQUEL) 25 MG tablet, Take 75 mg by mouth nightly   diazepam (VALIUM) 10 MG tablet, Take 10 mg by mouth 4 times daily. OxyCODONE HCl ER (OXYCONTIN) 60 MG T12A, Take 60 mg by mouth 3 times daily. [DISCONTINUED] azithromycin (ZITHROMAX) 500 MG injection, Infuse 500 mg intravenously  midodrine (PROAMATINE) 2.5 MG tablet, Take 2.5 mg by mouth 3 times daily as needed.  Takes one or 2 tabs 3x day    INVESTIGATIONS     Last 3 CMP:    Recent Labs     01/08/22  1600 01/09/22  0403 01/09/22  1610 01/10/22  0523   * 134*  --  137   K 3.9 4.3  --  4.5   CL 89* 97*  --  100   CO2 26 22  --  23   BUN 23 23  --  26*   CREATININE 3.09* 2.31*  --  1.33*   CALCIUM 8.7 8.3*  --  8.5*   PROT 6.8 6.6 6.4  --    LABALBU 3.8 3.4*  --   --    BILITOT 0.45 0.36  --   --    ALKPHOS 62 57  --   --    AST 60* 82*  --   --    ALT 46* 46*  --   --        Last 3 CBC:  Recent Labs     01/08/22  1600 01/09/22  0403   WBC 6.2 4.2   RBC 4.69 4.44   HGB 12.6* 11.9*   HCT 40.4* 37.6*   MCV 86.1 84.7   MCH 26.9 26.8   MCHC 31.2 31.6   RDW 15.2* 14.7*    143   MPV 8.9 8.8       ASSESSMENT     #1 acute kidney injury nonoliguric secondary to prerenal injury from volume depletion related to infection/hypotension and completed further by diuretic use. -Improving  Baseline creatinine normal  #2 COVID-19 pneumonia  #3 hypotension due to hypovolemia/infection-improved with IV fluids  #4 encephalopathy -etiology unclear  #5 history of atrial fibrillation    PLAN     #1 continue IV fluids  #2 continue to hold Aldactone  #3 follow-up pending serologies  #4 we will follow    Please do not hesitate to call with questions    This note is created with the assistance of a speech-recognition program. While intending to generate a document that actually reflects the content of the visit, no guarantees can be provided that every mistake has been identified and corrected by editing    Magdy Schwarz MD MD, [facility] Deric Zhong, [address]   1/10/2022 7:51 AM  [facility]

## 2022-01-10 NOTE — PLAN OF CARE
Problem: Non-Violent Restraints  Goal: No harm/injury to patient while restraints in use  1/10/2022 0854 by Galdino Cat RN  Outcome: Ongoing  Patient confused and combative. Non violent soft bilateral wrist restraints in use. ROM performed. No signs of injury noted. Will monitor.

## 2022-01-11 NOTE — PLAN OF CARE
Problem: Non-Violent Restraints  Goal: Removal from restraints as soon as assessed to be safe  Outcome: Ongoing  Goal: No harm/injury to patient while restraints in use  Outcome: Ongoing  Goal: Patient's dignity will be maintained  Outcome: Ongoing     Problem: Pain:  Goal: Control of chronic pain  Description: Control of chronic pain  Outcome: Ongoing     Problem: Skin Integrity:  Goal: Absence of new skin breakdown  Description: Absence of new skin breakdown  Outcome: Ongoing     Problem: Falls - Risk of:  Goal: Will remain free from falls  Description: Will remain free from falls  Outcome: Ongoing  Goal: Absence of physical injury  Description: Absence of physical injury  Outcome: Ongoing     Problem: Gas Exchange - Impaired  Goal: Absence of hypoxia  Outcome: Ongoing     Problem: Isolation Precautions - Risk of Spread of Infection  Goal: Prevent transmission of infection  Outcome: Ongoing     Problem: Risk for Fluid Volume Deficit  Goal: Maintain normal heart rhythm  Outcome: Ongoing     Problem: Confusion - Acute:  Goal: Absence of continued neurological deterioration signs and symptoms  Description: Absence of continued neurological deterioration signs and symptoms  Outcome: Ongoing     Problem: Injury - Risk of, Physical Injury:  Goal: Will remain free from falls  Description: Will remain free from falls  Outcome: Ongoing  Goal: Absence of physical injury  Description: Absence of physical injury  Outcome: Ongoing     Problem: VIOLENT RESTRAINTS  Goal: Removal from restraints as soon as assessed to be safe  Outcome: Ongoing  Goal: No harm/injury to patient while restraints in use  Outcome: Ongoing  Goal: Patient's dignity will be maintained  Outcome: Ongoing     Jefry Cancino is resting in bed at this time with no s/s of pain. Did c/o pain to sitter earlier and attempted to give PRN Tylenol. He swallowed one pill and spit out the other x2. Took Seroquel with no difficulty. PRN Ativan administered.  He remains free of injury and ROM given when safe to do so. He is still appropriate for restraints r/t continued combative behavior, confused, restless, agitated. Redirection ineffective. He is drinking water with assistance. He has venti-mask in place but needs to have it replaced frequently r/t removes it. Placed nose protection on patient. Droplet plus isolation precautions in place and observed. Sitter with patient; safety maintained.

## 2022-01-11 NOTE — PLAN OF CARE
Problem: Non-Violent Restraints  Goal: Removal from restraints as soon as assessed to be safe  1/11/2022 1025 by Blaire Blanco RN  Outcome: Ongoing  Patient still confused and combative. X4 assist with linen and reposition changes. Patient tries to bite, swing, pinch and spit at staff. Medicated as ordered. ROM  q hour to all 4 extremities.

## 2022-01-11 NOTE — PROGRESS NOTES
NEPHROLOGY PROGRESS NOTE      SUBJECTIVE     On IV fluids at 50 mill an hour isotonic saline. Urine output decent but does not have indwelling Forman catheter in place. Diuretics on hold. Blood pressure stable. Continues to be encephalopathic stage. No clear etiology at this point of time. Creatinine back to baseline. Renal function improving. OBJECTIVE     Vitals:    01/11/22 0329 01/11/22 0628 01/11/22 0759 01/11/22 0826   BP: (!) 143/93   (!) 157/90   Pulse: 69   76   Resp: 20   20   Temp: 98.2 °F (36.8 °C)   98 °F (36.7 °C)   TempSrc: Axillary   Axillary   SpO2: 97%  92% 91%   Weight:  266 lb 1 oz (120.7 kg)     Height:         24HR INTAKE/OUTPUT:      Intake/Output Summary (Last 24 hours) at 1/11/2022 0841  Last data filed at 1/11/2022 7835  Gross per 24 hour   Intake 2281.84 ml   Output --   Net 2281.84 ml       General appearance: Lethargic and confused  Respiratory::vesicular breath sounds,no wheeze/crackles  Cardiovascular:S1 S2 normal,no gallop or organic murmur. Abdomen:Non tender/non distended. Bowel sounds present  Extremities: No Cyanosis or Clubbing,Lower extremity edema  Neurological: Confused      MEDICATIONS     Scheduled Meds:    QUEtiapine  50 mg Oral BID    ziprasidone  20 mg IntraMUSCular Once    And    sterile water  1.2 mL IntraMUSCular Once    heparin (porcine)  5,000 Units SubCUTAneous 3 times per day    albuterol  2.5 mg Nebulization 4x daily    busPIRone  15 mg Oral Daily    DULoxetine  60 mg Oral Daily    famotidine  40 mg Oral Daily    finasteride  5 mg Oral Daily    cetirizine  10 mg Oral Daily    rivastigmine  1 patch TransDERmal Daily    [Held by provider] spironolactone  25 mg Oral BID    sodium chloride flush  5-40 mL IntraVENous 2 times per day    dexamethasone  6 mg IntraVENous Q24H    Vitamin D  2,000 Units Oral Daily     Continuous Infusions:    sodium chloride 50 mL/hr at 01/11/22 0626    sodium chloride       PRN Meds:  LORazepam **OR** LORazepam, sodium chloride flush, sodium chloride, ondansetron **OR** ondansetron, magnesium hydroxide, acetaminophen **OR** acetaminophen, guaiFENesin-dextromethorphan  Home Meds:                Medications Prior to Admission: spironolactone (ALDACTONE) 25 MG tablet, Take 25 mg by mouth 2 times daily  famotidine (PEPCID) 40 MG tablet, Take 40 mg by mouth daily  Cholecalciferol (VITAMIN D3) 50 MCG (2000 UT) CAPS, Take 50 Units by mouth once a week   azithromycin (ZITHROMAX) 250 MG tablet, Take 250 mg by mouth daily  rivastigmine (EXELON) 4.6 MG/24HR, Place 1 patch onto the skin daily  budesonide (PULMICORT) 0.5 MG/2ML nebulizer suspension, Take 1 ampule by nebulization 2 times daily  ipratropium (ATROVENT) 0.02 % nebulizer solution, Take 0.5 mg by nebulization 4 times daily  albuterol (PROVENTIL) (2.5 MG/3ML) 0.083% nebulizer solution, Take 2.5 mg by nebulization every 6 hours as needed for Wheezing  HYDROcodone-acetaminophen (NORCO)  MG per tablet, Take 1 tablet by mouth every 4 hours. finasteride (PROSCAR) 5 MG tablet, Take 5 mg by mouth daily. Loratadine 10 MG CAPS, Take 1 capsule by mouth daily. gabapentin (NEURONTIN) 600 MG tablet, Take 1,200 mg by mouth 3 times daily. busPIRone (BUSPAR) 15 MG tablet, Take 15 mg by mouth daily. DULoxetine (CYMBALTA) 60 MG capsule, Take 60 mg by mouth daily. QUEtiapine (SEROQUEL) 25 MG tablet, Take 75 mg by mouth nightly   diazepam (VALIUM) 10 MG tablet, Take 10 mg by mouth 4 times daily. OxyCODONE HCl ER (OXYCONTIN) 60 MG T12A, Take 60 mg by mouth 3 times daily. [DISCONTINUED] azithromycin (ZITHROMAX) 500 MG injection, Infuse 500 mg intravenously  midodrine (PROAMATINE) 2.5 MG tablet, Take 2.5 mg by mouth 3 times daily as needed.  Takes one or 2 tabs 3x day    INVESTIGATIONS     Last 3 CMP:    Recent Labs     01/08/22  1600 01/08/22  1600 01/09/22  0403 01/09/22  1610 01/10/22  0523 01/11/22  0558   *   < > 134*  --  137 134*   K 3.9   < > 4.3  --  4.5 4.0   CL 89*   < > 97*  --  100 97*   CO2 26   < > 22  --  23 23   BUN 23   < > 23  --  26* 27*   CREATININE 3.09*   < > 2.31*  --  1.33* 1.14   CALCIUM 8.7   < > 8.3*  --  8.5* 8.8   PROT 6.8  --  6.6 6.4  --   --    LABALBU 3.8  --  3.4*  --   --   --    BILITOT 0.45  --  0.36  --   --   --    ALKPHOS 62  --  57  --   --   --    AST 60*  --  82*  --   --   --    ALT 46*  --  46*  --   --   --     < > = values in this interval not displayed. Last 3 CBC:  Recent Labs     01/08/22  1600 01/09/22  0403   WBC 6.2 4.2   RBC 4.69 4.44   HGB 12.6* 11.9*   HCT 40.4* 37.6*   MCV 86.1 84.7   MCH 26.9 26.8   MCHC 31.2 31.6   RDW 15.2* 14.7*    143   MPV 8.9 8.8       ASSESSMENT     #1 acute kidney injury nonoliguric secondary to prerenal injury from volume depletion related to infection/hypotension and completed further by diuretic use. -Resolved.   Creatinine peaked to 3.09  Baseline creatinine normal  #2 COVID-19 pneumonia  #3 hypotension due to hypovolemia/infection-improved with IV fluids  #4 encephalopathy -etiology unclear  #5 history of atrial fibrillation    PLAN     #1 continue IV fluids till p.o. intake optimized  #2  Okay to resume Aldactone  #3  We will sign off    Please do not hesitate to call with questions    This note is created with the assistance of a speech-recognition program. While intending to generate a document that actually reflects the content of the visit, no guarantees can be provided that every mistake has been identified and corrected by editing    Nettie Woods MD MD, [facility] Nathalie Martínez, [address]   1/11/2022 8:41 AM  [facility] [place]

## 2022-01-11 NOTE — PROGRESS NOTES
Transitions of Care Pharmacy Service   Medication Review    The patient's list of current home medications was confirmed by his PCP office and pharmacy records. Source(s) of information: PCP, Candace refill report, Boris      Please feel free to call me with any questions about this encounter. Thank you. Sakina Baez, [facility]   Transitions  Care Pharmacy Service  Phone:  [phone]  Fax: [phone]      Electronically signed by Sakina Baez, [address] [place] [address] on 1/11/2022 at 6:44 PM           Medications Prior to Admission:   spironolactone (ALDACTONE) 25 MG tablet, Take 25 mg by mouth 2 times daily  famotidine (PEPCID) 40 MG tablet, Take 40 mg by mouth daily  Cholecalciferol (VITAMIN D3) 50 MCG (2000 UT) CAPS, Take 50 Units by mouth once a week   azithromycin (ZITHROMAX) 500 MG tablet, Take 500 mg by mouth daily   rivastigmine (EXELON) 4.6 MG/24HR, Place 1 patch onto the skin daily  budesonide (PULMICORT) 0.5 MG/2ML nebulizer suspension, Take 1 ampule by nebulization 2 times daily  ipratropium (ATROVENT) 0.02 % nebulizer solution, Take 0.5 mg by nebulization 4 times daily  albuterol (PROVENTIL) (2.5 MG/3ML) 0.083% nebulizer solution, Take 2.5 mg by nebulization every 4 hours as needed for Wheezing   HYDROcodone-acetaminophen (NORCO)  MG per tablet, Take 1 tablet by mouth every 4 hours as needed. finasteride (PROSCAR) 5 MG tablet, Take 5 mg by mouth daily. loratadine (CLARITIN) 10 MG tablet, Take 1 capsule by mouth daily   gabapentin (NEURONTIN) 600 MG tablet, Take 1,200 mg by mouth 3 times daily. Takes 2 tabs (=1200mg) TID  busPIRone (BUSPAR) 15 MG tablet, Take 15 mg by mouth daily. DULoxetine (CYMBALTA) 60 MG capsule, Take 60 mg by mouth daily. QUETIAPINE FUMARATE PO, Take 75 mg by mouth nightly Takes 25mg + 50mg tabs for 75mg nightly dose  diazepam (VALIUM) 10 MG tablet, Take 10 mg by mouth 4 times daily as needed for Anxiety.    OxyCODONE HCl ER (OXYCONTIN) 60 MG T12A, Take 60 mg by mouth 3 times daily. midodrine (PROAMATINE) 2.5 MG tablet, Take 2.5 mg by mouth 3 times daily as needed.  Takes one or 2 tabs 3x day

## 2022-01-11 NOTE — PROGRESS NOTES
Occupational [address]  Occupational Therapy Not Seen Note    Patient not available for Occupational Therapy due to:    [] Testing:    [] Hemodialysis    [x] Cancelled by RN: 1/11: (CX) Pt is currently in restraints and not appropriate for therapy at this time. Will continue to follow.      []Refusal by Patient:    [] Surgery:     [] Intubation:     [] Pain Medication:    [] Sedation:     [] Spine Precautions :    [] Medical Instability:    [] Other:      Montell Closs, OT

## 2022-01-11 NOTE — PROGRESS NOTES
[facility]  Office: [address], DO, Neftali Lopez, DO, Tejal Valadez, DO, Sherine Schilling Blood, DO, Bety Parsons MD, Kim Brady MD, Brannon Black MD, Faith Gilmore MD, Bonnie Hodgkin, MD, Liz Marques MD, Kodak Chanel MD, Kirby Graham, DO, Terrell Zepeda, DO, Romulo Cohen MD,  Brian Rothman, DO, Masoud Lopez MD, Jake Bray MD, Doreen Akers MD, Jackie Vasquez MD, Verito Jay MD, Ashvin Steel MD, Shant Talbert MD, Anup Alonso, Kayla Walker, CNP, Humphrey Otero, CNP, Casey Gregory, CNS, Amy Blackmon, CNP, Solo Delgado, CNP, Marcello Faulkner, CNP, Naomi Ramirez, CNP, Ralph Santos, CNP, Radonna Aschoff, PA-C, Latia Brewster DNP, Tony Reyna, [facility], Grant Padilla, CNP, Luz Faustin, CNP, Raghu Arteaga, CNP, Luna Ely, CNP, Petros Da Silva, CNP, Rex Allen, [facility]    Progress Note    1/11/2022    12:01 PM    Name:   Trav Chapa  MRN:     [id number]     Clive Wellsn:      [de-identified]   Room:   [address] [place] [address] Day:  3  Admit Date:  1/8/2022  3:33 PM    PCP:   Earna Galeazzi, MD  Code Status:  Full Code    Subjective:     C/C:   Chief Complaint   Patient presents with    Altered Mental Status     Interval History Status:     Staff reports patient has been intermittently combative however it seems to be improving. He has been in 4 point violent restraints. He is still attempting to pull at lines/tubes. He has a sitter at bedside. He is on NRB, SPO2 92% and is not following commands. He did have an episode of n/v over night and has complained of abdominal pain twice today with loose stools. Staff denies any signs of bleeding  Vitals stable, afebrile  Brief History: This is a 67-year-old male that presents with altered mental status. He has been found to have encephalopathy as well as COVID-19 infection. He is admitted for further evaluation and management.   He has required oxygen but frequently pulls off. He states he is not vaccinated as he does not believe he should have the vaccine for 4 to 5 years according to the news story that he had seen on television. Review of Systems:     Cannot be obtained due to confusion with flight of ideas    Medications: Allergies: Allergies   Allergen Reactions    Latex     Bee Venom     Prednisone Other (See Comments)     Mood swings       Current Meds:   Scheduled Meds:    QUEtiapine  50 mg Oral BID    ziprasidone  20 mg IntraMUSCular Once    And    sterile water  1.2 mL IntraMUSCular Once    heparin (porcine)  5,000 Units SubCUTAneous 3 times per day    albuterol  2.5 mg Nebulization 4x daily    busPIRone  15 mg Oral Daily    DULoxetine  60 mg Oral Daily    famotidine  40 mg Oral Daily    finasteride  5 mg Oral Daily    cetirizine  10 mg Oral Daily    rivastigmine  1 patch TransDERmal Daily    [Held by provider] spironolactone  25 mg Oral BID    sodium chloride flush  5-40 mL IntraVENous 2 times per day    dexamethasone  6 mg IntraVENous Q24H    Vitamin D  2,000 Units Oral Daily     Continuous Infusions:    sodium chloride 50 mL/hr at 01/11/22 2185    sodium chloride       PRN Meds: LORazepam **OR** LORazepam, sodium chloride flush, sodium chloride, ondansetron **OR** ondansetron, magnesium hydroxide, acetaminophen **OR** acetaminophen, guaiFENesin-dextromethorphan    Data:     Past Medical History:   has a past medical history of Anxiety, Arthritis, Atrial fib/flutter, transient, Bradycardia, COPD (chronic obstructive pulmonary disease) ([facility] Dementia ([facility] Depression, GERD (gastroesophageal reflux disease), Neuropathy, Parkinson's disease ([facility] Pneumonia, and Syncope. Social History:   reports that he has quit smoking. His smoking use included cigarettes. He has never used smokeless tobacco. He reports that he does not drink alcohol and does not use drugs. Family History: History reviewed.  No pertinent family Results   Component Value Date    POCPH 7.414 01/09/2022    POCPCO2 40.9 01/09/2022    POCPO2 52.8 01/09/2022    POCHCO3 26.1 01/09/2022    NBEA NOT REPORTED 01/09/2022    PBEA 1 01/09/2022    EYT4KIN NOT REPORTED 01/09/2022    [id number] 87 01/09/2022    FIO2 NOT REPORTED 01/09/2022     Lab Results   Component Value Date/Time    SPECIAL NOT REPORTED 03/30/2012 06:02 PM     Lab Results   Component Value Date/Time    CULTURE NO GROWTH 03/30/2012 06:02 PM    CULTURE  03/30/2012 06:02 PM     Performed at [facility] [id number] [place] [address], [facility] 3 [phone]       Radiology:  CT HEAD WO CONTRAST    Result Date: 1/8/2022  No acute intracranial abnormality. RECOMMENDATIONS: Unavailable     XR CHEST PORTABLE    Result Date: 1/8/2022  Bilateral lower lung field reticulonodular prominence which is nonspecific and may represent bronchiolitis, fibrosis, COVID-19 pneumonia or other infectious/inflammatory process. Physical Examination:        General appearance:  alert, combative and no distress  Mental Status: Cannot be assessed due to orientation  Lungs: decreased breath sounds bilaterally, normal effort  Heart:  regular rate and rhythm, no murmur  Abdomen:  soft, nontender, nondistended, normal bowel sounds, no masses, hepatomegaly, splenomegaly  Extremities:  no edema, redness, tenderness in the calves  Skin:  no gross lesions, rashes, induration    Assessment:        Hospital Problems           Last Modified POA    * (Principal) Pneumonia due to COVID-19 virus 1/9/2022 Yes    AMS (altered mental status) 1/9/2022 Yes    COPD without exacerbation ([address] 1/9/2022 Yes    A-fib ([address] 1/9/2022 Yes    Gastroesophageal reflux disease without esophagitis 1/9/2022 Yes    Neuropathy 1/9/2022 Yes    JO (acute kidney injury) ([address] 1/9/2022 Yes    Elevated troponin 1/9/2022 Yes    Encephalopathy due to COVID-19 virus 1/9/2022 Yes          Plan:        1. Continue Seroquel BID and ativan PRN for combative behavior. Discontinue 4 point restraints and trial bilateral wrist restraints with sitter. 2. Continue IV Decadron as ordered. Consider transition to oral in the next 24 hours if CRP continues to improve  3. Supplemental oxygen, wean as able  4. GI and DVT prophylaxis  5. Trend labs, monitor inflammatory markers and correct electrolytes as needed  6. Avoid nephrotoxic agents, renal function continues to improve  7. IV hydration per nephrology  8. Continue home Exelon  9. Check KUB and stool GI panel   10.  Switch pepcid to protonix as it could contribute to AMS   11. Plan discussed with staff      PATRICK Feldman NP  1/11/2022  12:14 PM

## 2022-01-11 NOTE — PROGRESS NOTES
Pulmonary Critical Care Progress Note  BRAULIO Hatch/Alejandro Diana MD     Patient seen for the follow up of acute hypoxic respiratory insufficiency, Pneumonia due to COVID-19 virus     Subjective:  He is resting in bed, remains confused, RN and sitter at bedside. He remains in restraints secondary to pulling at lines and being combative. He is not cooperative with my exam or following any commands at this time. He is on nonrebreather at 6 L because he has been mouth breathing per RN, SPO2 in the low 90s per nursing. Examination:  Vitals: BP (!) 157/90   Pulse 76   Temp 98 °F (36.7 °C) (Axillary)   Resp 20   Ht 5' 11\" (1.803 m)   Wt 266 lb 1 oz (120.7 kg)   SpO2 91%   BMI 37.11 kg/m²   General appearance: In bed, RN at bedside, confused  Neck: No JVD  Lungs: Moderate to decreased air exchange, no crackles or wheezing  Heart: regular rate and rhythm, S1, S2 normal, no gallop  Abdomen: Soft, non tender, + BS  Extremities: no cyanosis or clubbing.  No significant edema    LABs:  CBC:   Recent Labs     01/08/22  1600 01/09/22  0403   WBC 6.2 4.2   HGB 12.6* 11.9*   HCT 40.4* 37.6*    143     BMP:   Recent Labs     01/10/22  0523 01/11/22  0558    134*   K 4.5 4.0   CO2 23 23   BUN 26* 27*   CREATININE 1.33* 1.14   LABGLOM 54* >60   GLUCOSE 115* 137*     PT/INR:   Recent Labs     01/08/22  1600 01/09/22  0403   PROTIME 15.5* 16.1*   INR 1.2 1.3     APTT:  Recent Labs     01/08/22  1600 01/09/22  0403   APTT 36.6* 38.1*     LIVER PROFILE:  Recent Labs     01/08/22  1600 01/09/22 0403   AST 60* 82*   ALT 46* 46*   LABALBU 3.8 3.4*     ABG:  Lab Results   Component Value Date    CRG3VWW NOT REPORTED 01/09/2022    FIO2 NOT REPORTED 01/09/2022       Lab Results   Component Value Date    POCPH 7.414 01/09/2022    POCPCO2 40.9 01/09/2022    POCPO2 52.8 01/09/2022    POCHCO3 26.1 01/09/2022    NBEA NOT REPORTED 01/09/2022    PBEA 1 01/09/2022    AUC0ZJZ NOT REPORTED 01/09/2022    PDLJ8FVV 87 01/09/2022    FIO2 NOT REPORTED 01/09/2022     Radiology:  X-ray chest 1/8/2022      Impression:  · Acute hypoxic respiratory insufficiency  · COVID-19 infection  · Altered mental status/encephalopathy  · Acute kidney injury  · Suspected obstructive sleep apnea/Obesity  · A. fib, anxiety, arthritis, GERD    Recommendations:  · Airborne isolation  · Oxygen via nasal cannula to keep SPO2 90% or greater  · BiPAP support as necessary  · Incentive spirometry every hour while wake  · Prone as tolerated  · Decadron  · Albuterol via nebulizer four times a day  · IV fluids per nephrology  · Monitor mental status  · Seroquel 50 mg twice daily  · X-ray chest in am  · Labs: CBC and BMP in am  · DVT prophylaxis with subcu heparin  · Above assessment and plan will be reviewed with Dr. Yaritza Kirkpatrick.   Patient plan will be finalized following review by Dr. Yaritza Kirkpatrick  · Will follow with PATRICK Urias-CNP   Pulmonary Critical Care and Sleep Medicine,  Patient seen under the supervision of Luke Garcia MD, [facility]

## 2022-01-11 NOTE — PROGRESS NOTES
Physical Therapy  DATE: 2022    NAME: Italo Oscar  MRN: [id number]   : 1960    Patient not seen this date for Physical Therapy due to:      [] Cancel by RN or physician due to:    [] Hemodialysis    [] Critical Lab Value Level     [] Blood transfusion in progress    [] Acute or unstable cardiovascular status   _MAP < 55 or more than >115  _HR < 40 or > 130    [] Acute or unstable pulmonary status   -FiO2 > 60%   _RR < 5 or >40    _O2 sats < 85%    [] Strict Bedrest    [] Off Unit for surgery or procedure    [] Off Unit for testing       [] Pending imaging to R/O fracture    [] Refusal by Patient      [x] Other  (Per Jovany Jacome, pt combative,(swinging at staff when UE unrestrained) and restrained x 4 extremities, will hold PT today as pt not appropriate for PT intervention at this time)      [] PT being discontinued at this time. Patient independent. No further needs. [] PT being discontinued at this time as the patient has been transferred to hospice care. No further needs.       Elsa Enriquez, PT  08:33

## 2022-01-11 NOTE — PROGRESS NOTES
Patient still confused and combative. Patient attempts to swing, spit, pinch, kick and bite staff during care, x4 assist with staff to change linens, brief changes and do reposition changes. Bilateral soft wrist and bilateral ankle restraints still in place. ROM q hour all 4 extremities. Cont pulse ox in place. Patient on venti mask d/t mouth breathing and oxygen WNL's. Medicated as ordered. Sitter at bedside. Total assist with meals, but very poor appetite. Patient has developed blister to right wrist area (mepilex applied) and wrist restraint adjusted not to be directly on blister site.

## 2022-01-12 NOTE — CARE COORDINATION
Discharge planning    Patient chart reviewed. Patient still with 1;1 sitter, 2 point restraints due to pulling at lines and taking off 02. He is admitted with COVID and currently on venti mask with sats at 92%. He has underlying dementia. Therapy to evaluate once medically stable. Patient lives with wife and has 02 at 2-3 liters 24/7 thru [facility]. He also has shower seat, walker, cane and w/c in the home. Spouse wishes to take home as he has been in snf in past and did not have a good experience. Will need to follow for potential home care and increased 02 needs. Possible ltac. Med George

## 2022-01-12 NOTE — PROGRESS NOTES
Occupational [address] [facility]  Occupational Therapy Not Seen Note    Patient not available for Occupational Therapy due to:    [] Testing:    [] Hemodialysis    [] Cancelled by RN:    []Refusal by Patient:    [] Surgery:     [] Intubation:     [] Pain Medication:    [] Sedation:     [] Spine Precautions :    [] Medical Instability:    [x] Other: pt not appropriate per RN; in restraints.     Nabeel Fuller OTR/L

## 2022-01-12 NOTE — PROGRESS NOTES
Dr. Schafer rounded-would like to keep Pt on PCU vs transfer. Will monitor Pt closely to avoid agitation and to keep as comfortable as possible. OK to d/c IVF.

## 2022-01-12 NOTE — PROGRESS NOTES
Occupational Therapy  DATE: 2022    NAME: Rigoberto Borges  MRN: [id number]   : 1960    Patient not seen this date for Occupational Therapy due to:      [] Cancel by RN or physician due to:    [] Hemodialysis    [] Critical Lab Value Level     [] Blood transfusion in progress    [] Acute or unstable cardiovascular status   _MAP < 55 or more than >115  _HR < 40 or > 130    [] Acute or unstable pulmonary status   -FiO2 > 60%   _RR < 5 or >40    _O2 sats < 85%    [] Strict Bedrest    [] Off Unit for surgery or procedure    [] Off Unit for testing       [] Pending imaging to R/O fracture    [] Refusal by Patient      [x] Other-: Cancel, in restraints and not appropriate for eval at this time      [] PT being discontinued at this time. Patient independent. No further needs. [] PT being discontinued at this time as the patient has been transferred to hospice care. No further needs.       Vanessa Eugene, OT

## 2022-01-12 NOTE — PROGRESS NOTES
Pulmonary Critical Care Progress Note  Juan Francisco Christy, BRAULIO/Alejandro Diana MD     Patient seen for the follow up of acute hypoxic respiratory insufficiency, Pneumonia due to COVID-19 virus     Subjective:  He is resting in bed, remains confused, sitter at bedside. He remains in restraints secondary to pulling at lines and being combative. He is not cooperative with my exam or following any commands at this time. He is on nonrebreather at 6 L because he has been mouth breathing per RN, SPO2 in the low 90s per nursing. Examination:  Vitals: BP (!) 157/107   Pulse 80   Temp 99.7 °F (37.6 °C) (Axillary)   Resp (!) 35   Ht 5' 11\" (1.803 m)   Wt 266 lb 1 oz (120.7 kg)   SpO2 92%   BMI 37.11 kg/m²   General appearance: In bed, RN at bedside, confused  Neck: No JVD  Lungs: Moderate to decreased air exchange, no crackles or wheezing  Heart: regular rate and rhythm, S1, S2 normal, no gallop  Abdomen: Soft, non tender, + BS  Extremities: no cyanosis or clubbing.  No significant edema    LABs:  BMP:   Recent Labs     01/11/22  0558 01/12/22  0531   * 137   K 4.0 3.5*   CO2 23 22   BUN 27* 23   CREATININE 1.14 0.99   LABGLOM >60 >60   GLUCOSE 137* 149*     ABG:  Lab Results   Component Value Date    ZUO9WYK NOT REPORTED 01/09/2022    FIO2 NOT REPORTED 01/09/2022       Lab Results   Component Value Date    POCPH 7.414 01/09/2022    POCPCO2 40.9 01/09/2022    POCPO2 52.8 01/09/2022    POCHCO3 26.1 01/09/2022    NBEA NOT REPORTED 01/09/2022    PBEA 1 01/09/2022    FMP2EKE NOT REPORTED 01/09/2022    NUIC7XFH 87 01/09/2022    FIO2 NOT REPORTED 01/09/2022     Radiology:  X-ray chest 1/12/2022      Impression:  · Acute hypoxic respiratory insufficiency  · COVID-19 infection  · Altered mental status/encephalopathy  · Acute kidney injury  · Suspected obstructive sleep apnea/Obesity  · A. fib, anxiety, arthritis, GERD    Recommendations:  · Airborne isolation  · Oxygen via nasal cannula to keep SPO2 90% or greater  · BiPAP support as necessary  · Incentive spirometry every hour while wake  · Prone as tolerated  · Decadron  · Albuterol via nebulizer four times a day  · IV fluids, discontinue once oral intake improves. Nephrology signed off. · Monitor mental status  · Seroquel 50 mg twice daily  · X-ray chest in am  · Labs: CBC and BMP in am  · DVT prophylaxis with subcu heparin  · Discussed with RN  · Above assessment and plan will be reviewed with Dr. Fern Rodriguez.   Patient plan will be finalized following review by Dr. Fern Rodriguez  · Will follow with PATRICK Wall-CNP   Pulmonary Critical Care and Sleep Medicine,  Patient seen under the supervision of Roger Ibanez MD, [facility]

## 2022-01-12 NOTE — PROGRESS NOTES
[facility]  Office: [address], DO, Neftali Lopez, DO, Tejal Valadez, DO, Sherine Schilling Blood, DO, Bety Parsons MD, Kim Brady MD, Brannon Black MD, Faith Gilmore MD, Bonnie Hodgkin, MD, Liz Marques MD, Kodak Chanel MD, Kirby Graham, DO, Terrell Zepeda, DO, Romulo Cohen MD,  Brian Rothman, DO, Masoud Lopez MD, Jake Bray MD, Doreen Akers MD, Jackie Vasquez MD, Verito Jay MD, Ashvin Steel MD, Shant Talbert MD, Anup [facility], [facility], CNP, Humphrey Otero, CNP, Casey Gregory, CNS, Amy Blackmon, CNP, Solo Delgado, CNP, Marcello Faulkner, CNP, Naomi Ramirez, CNP, Ralph Santos CNP, Radonna Aschoff, PA-C, Latia Brewster [facility], Tony Reyna [facility], Grant Padilla, CNP, Luz Faustin, CNP, Raghu Arteaga CNP, Luna Ely, CNP, Petros Da Silva CNP, Rex Allen, [facility]    Progress Note    1/12/2022    10:49 AM    Name:   Trav Chapa  MRN:     [id number]     Acct:      [de-identified]   Room:   [address] [place] [address] Day:  4  Admit Date:  1/8/2022  3:33 PM    PCP:   Earna Galeazzi, MD  Code Status:  Full Code    Subjective:     C/C:   Chief Complaint   Patient presents with   [place] Altered Mental Status     Interval History Status:     Staff reports patient is intermittently combative, requiring bilateral soft wrist restraints and IV ativan. He is still attempting to pull at lines/tubes. He has a sitter at bedside. He is on NRB, SPO2 93%. He responds to voice but does not follow commands. Loose stools continue, Bp's elevated but vitals otherwise stable   Brief History: This is a 17-year-old male that presents with altered mental status. He has been found to have encephalopathy as well as COVID-19 infection. He is admitted for further evaluation and management. He has required oxygen but frequently pulls off.   He states he is not vaccinated as he does not believe he should have the vaccine for 4 to 5 years according to the news story that he had seen on television. Review of Systems:   Alert to person and place  Cannot be fully assessed due to AMS    Medications: Allergies: Allergies   Allergen Reactions    Latex     Bee Venom     Prednisone Other (See Comments)     Mood swings       Current Meds:   Scheduled Meds:    pantoprazole  40 mg IntraVENous Daily    And    sodium chloride (PF)  10 mL IntraVENous Daily    QUEtiapine  50 mg Oral BID    ziprasidone  20 mg IntraMUSCular Once    And    sterile water  1.2 mL IntraMUSCular Once    heparin (porcine)  5,000 Units SubCUTAneous 3 times per day    albuterol  2.5 mg Nebulization 4x daily    busPIRone  15 mg Oral Daily    DULoxetine  60 mg Oral Daily    finasteride  5 mg Oral Daily    cetirizine  10 mg Oral Daily    rivastigmine  1 patch TransDERmal Daily    [Held by provider] spironolactone  25 mg Oral BID    sodium chloride flush  5-40 mL IntraVENous 2 times per day    dexamethasone  6 mg IntraVENous Q24H    Vitamin D  2,000 Units Oral Daily     Continuous Infusions:    sodium chloride 50 mL/hr at 01/12/22 0631    sodium chloride       PRN Meds: LORazepam **OR** LORazepam, sodium chloride flush, sodium chloride, ondansetron **OR** ondansetron, magnesium hydroxide, acetaminophen **OR** acetaminophen, guaiFENesin-dextromethorphan    Data:     Past Medical History:   has a past medical history of Anxiety, Arthritis, Atrial fib/flutter, transient, Bradycardia, COPD (chronic obstructive pulmonary disease) ([facility] [address] Dementia ([facility] [address] Depression, GERD (gastroesophageal reflux disease), Neuropathy, Parkinson's disease ([facility] [address] Pneumonia, and Syncope. Social History:   reports that he has quit smoking. His smoking use included cigarettes. He has never used smokeless tobacco. He reports that he does not drink alcohol and does not use drugs. Family History: History reviewed. No pertinent family history.     Vitals:  BP (!) 157/107   Pulse 80 Temp 99.7 °F (37.6 °C) (Axillary)   Resp (!) 35   Ht 5' 11\" (1.803 m)   Wt 266 lb 1 oz (120.7 kg)   SpO2 92%   BMI 37.11 kg/m²   Temp (24hrs), Av.7 °F (37.1 °C), Min:98 °F (36.7 °C), Max:99.7 °F (37.6 °C)    No results for input(s): POCGLU in the last 72 hours. I/O (24Hr): Intake/Output Summary (Last 24 hours) at 2022 1055  Last data filed at 2022 0631  Gross per 24 hour   Intake 1356.5 ml   Output --   Net 1356.5 ml       Labs:  Hematology:  Recent Labs     01/10/22  0523   .7*   DDIMER 1.59*     Chemistry:  Recent Labs     01/10/22  0523 22  0558 22  0531    134* 137   K 4.5 4.0 3.5*    97* 100   CO2 23 23 22   GLUCOSE 115* 137* 149*   BUN 26* 27* 23   CREATININE 1.33* 1.14 0.99   MG  --   --  1.9   ANIONGAP 14 14 15   LABGLOM 54* >60 >60   GFRAA >60 >60 >60   CALCIUM 8.5* 8.8 8.9     Recent Labs     22  1610   PROT 6.4     ABG:  Lab Results   Component Value Date    POCPH 7.414 2022    POCPCO2 40.9 2022    POCPO2 52.8 2022    POCHCO3 26.1 2022    NBEA NOT REPORTED 2022    PBEA 1 2022    IUA9CLG NOT REPORTED 2022    NNWP5LBW 87 2022    FIO2 NOT REPORTED 2022     Lab Results   Component Value Date/Time    SPECIAL NOT REPORTED 2012 06:02 PM     Lab Results   Component Value Date/Time    CULTURE NO GROWTH 2012 06:02 PM    CULTURE  2012 06:02 PM     Performed at [address] [phone]       Radiology:  CT HEAD WO CONTRAST    Result Date: 2022  No acute intracranial abnormality. RECOMMENDATIONS: Unavailable     XR CHEST PORTABLE    Result Date: 2022  Bilateral lower lung field reticulonodular prominence which is nonspecific and may represent bronchiolitis, fibrosis, COVID-19 pneumonia or other infectious/inflammatory process.        Physical Examination:        General appearance:  alert, combative and no distress  Mental Status: Cannot be assessed due to orientation  Lungs: decreased breath sounds bilaterally, normal effort  Heart:  regular rate and rhythm, no murmur  Abdomen:  soft, nontender, nondistended, normal bowel sounds, no masses, hepatomegaly, splenomegaly  Extremities:  no edema, redness, tenderness in the calves  Skin:  no gross lesions, rashes, induration    Assessment:        Hospital Problems           Last Modified POA    * (Principal) Pneumonia due to COVID-19 virus 1/9/2022 Yes    AMS (altered mental status) 1/9/2022 Yes    COPD without exacerbation ([facility] [address] 1/9/2022 Yes    A-fib ([facility] [address] 1/9/2022 Yes    Gastroesophageal reflux disease without esophagitis 1/9/2022 Yes    Neuropathy 1/9/2022 Yes    JO (acute kidney injury) ([facility] [address] 1/9/2022 Yes    Elevated troponin 1/9/2022 Yes    Encephalopathy due to COVID-19 virus 1/9/2022 Yes          Plan:        1. Continue Seroquel BID and ativan PRN for combative behavior. Renew order for bilateral wrist restraints, continue bedside sitter. If patient continues to be aggressive with staff may need to consider transfer to ICU for precedex drip   2. Transition IV Decadron to oral   3. Supplemental oxygen, on vent-imask at 6L, wean as able  4. Continue GI and DVT prophylaxis  5. Trend labs, monitor inflammatory markers and correct electrolytes as needed  6. Avoid nephrotoxic agents, renal function continues to improve  7. IV hydration per nephrology  8. Continue home Exelon  9. KUB unremarkable, stool studies pending   10. Continue protonix   11. Insert FMS for frequent loose stools  12. Monitor and control BP- will add IV lopressor PRN with parameters   13.  Plan discussed with staff      PATRICK Larson NP  1/12/2022  10:55 AM

## 2022-01-12 NOTE — PROGRESS NOTES
Physical Therapy  . ptc        Physical Therapy Cancel Note      DATE: 2022    NAME: Danis Holloway  MRN: [id number]   : 1960      Patient not seen this date for Physical Therapy due to:    RN CX-  Pt. In restraints and kicking. Not approp. At this time.        Electronically signed by Camilo Etienne PT on 2022 at 3:46 PM

## 2022-01-12 NOTE — PLAN OF CARE
Problem: Non-Violent Restraints  Goal: Removal from restraints as soon as assessed to be safe  Outcome: Ongoing  Goal: No harm/injury to patient while restraints in use  Outcome: Ongoing  Goal: Patient's dignity will be maintained  Outcome: Ongoing     Problem: Pain:  Goal: Control of acute pain  Description: Control of acute pain  Outcome: Ongoing     Problem: Skin Integrity:  Goal: Will show no infection signs and symptoms  Description: Will show no infection signs and symptoms  Outcome: Ongoing  Goal: Absence of new skin breakdown  Description: Absence of new skin breakdown  Outcome: Ongoing     Problem: Falls - Risk of:  Goal: Will remain free from falls  Description: Will remain free from falls  Outcome: Ongoing     Problem: Gas Exchange - Impaired  Goal: Absence of hypoxia  Outcome: Ongoing     Problem: Breathing Pattern - Ineffective  Goal: Ability to achieve and maintain a regular respiratory rate  Outcome: Ongoing     Problem: Isolation Precautions - Risk of Spread of Infection  Goal: Prevent transmission of infection  Outcome: Ongoing     Problem: Confusion - Acute:  Goal: Absence of continued neurological deterioration signs and symptoms  Description: Absence of continued neurological deterioration signs and symptoms  Outcome: Ongoing     Problem: Injury - Risk of, Physical Injury:  Goal: Will remain free from falls  Description: Will remain free from falls  Outcome: Ongoing     Problem: Mood - Altered:  Goal: Absence of abusive behavior  Description: Absence of abusive behavior  Outcome: Ongoing     Problem: Psychomotor Activity - Altered:  Goal: Absence of psychomotor disturbance signs and symptoms  Description: Absence of psychomotor disturbance signs and symptoms  Outcome: Ongoing     Problem: Sensory Perception - Impaired:  Goal: Demonstrates accurate environmental perceptions  Description: Demonstrates accurate environmental perceptions  Outcome: Ongoing  Goal: Able to distinguish between reality-based and nonreality-based thinking  Description: Able to distinguish between reality-based and nonreality-based thinking  Outcome: Ongoing  Goal: Able to interrupt nonreality-based thinking  Description: Able to interrupt nonreality-based thinking  Outcome: Ongoing     Problem: Sleep Pattern Disturbance:  Goal: Appears well-rested  Description: Appears well-rested  Outcome: Ongoing     Rosana La is resting poorly this shift. He currently has eyes closed, but continues to have slow movements and altered thinking even at rest. While repositioning patient recently, he kept yelling, \"I want my phone call! I want my phone call! \" Jazmyn Vargas him he was in the hospital, and this seemed to help him reorient, but he continues to be confused and calls out for people who aren't there, as well as yelling things that are unintelligible to staff. He continues on O2 via venti mask, which needs to be replaced repeatedly r/t facial movement. Respirations elevated at times r/t restlessness and agitation. PRN Ativan x1 to this point in shift. He continues in arm restraints with 1:1 sitter. He has had loose stools x3 with need for partial to full linen changes each time. Droplet plus precautions in place and observed. PCA at bedside; safety maintained.

## 2022-01-13 NOTE — PROGRESS NOTES
television. Review of Systems:   Alert to person and place, denies pain  Cannot be fully assessed due to AMS    Medications: Allergies: Allergies   Allergen Reactions    Latex     Bee Venom     Prednisone Other (See Comments)     Mood swings       Current Meds:   Scheduled Meds:    dexamethasone  6 mg Oral Daily    pantoprazole  40 mg IntraVENous Daily    And    sodium chloride (PF)  10 mL IntraVENous Daily    QUEtiapine  50 mg Oral BID    ziprasidone  20 mg IntraMUSCular Once    And    sterile water  1.2 mL IntraMUSCular Once    heparin (porcine)  5,000 Units SubCUTAneous 3 times per day    albuterol  2.5 mg Nebulization 4x daily    busPIRone  15 mg Oral Daily    DULoxetine  60 mg Oral Daily    finasteride  5 mg Oral Daily    cetirizine  10 mg Oral Daily    rivastigmine  1 patch TransDERmal Daily    [Held by provider] spironolactone  25 mg Oral BID    sodium chloride flush  5-40 mL IntraVENous 2 times per day    Vitamin D  2,000 Units Oral Daily     Continuous Infusions:    dexmedetomidine (PRECEDEX) IV infusion      sodium chloride       PRN Meds: metoprolol, LORazepam **OR** LORazepam, sodium chloride flush, sodium chloride, ondansetron **OR** ondansetron, magnesium hydroxide, acetaminophen **OR** acetaminophen, guaiFENesin-dextromethorphan    Data:     Past Medical History:   has a past medical history of Anxiety, Arthritis, Atrial fib/flutter, transient, Bradycardia, COPD (chronic obstructive pulmonary disease) ([facility] [address] Dementia ([facility] [address] Depression, GERD (gastroesophageal reflux disease), Neuropathy, Parkinson's disease ([facility] [address] Pneumonia, and Syncope. Social History:   reports that he has quit smoking. His smoking use included cigarettes. He has never used smokeless tobacco. He reports that he does not drink alcohol and does not use drugs.      Family History:   Family History   Family history unknown: Yes       Vitals:  BP (!) 148/92   Pulse 86   Temp 99.1 °F (37.3 °C) (Axillary) Resp 30   Ht 5' 11\" (1.803 m)   Wt 266 lb 1 oz (120.7 kg)   SpO2 (!) 88%   BMI 37.11 kg/m²   Temp (24hrs), Av.6 °F (37 °C), Min:98.2 °F (36.8 °C), Max:99.1 °F (37.3 °C)    No results for input(s): POCGLU in the last 72 hours. I/O (24Hr): Intake/Output Summary (Last 24 hours) at 2022 1210  Last data filed at 2022 1000  Gross per 24 hour   Intake --   Output 700 ml   Net -700 ml       Labs:  Hematology:  Recent Labs     22  0857   WBC 4.9   RBC 5.76   HGB 15.1   HCT 46.7   MCV 81.1*   MCH 26.2   MCHC 32.3   RDW 14.3      MPV 9.3     Chemistry:  Recent Labs     22  0558 22  0531 22  0857   * 137 137   K 4.0 3.5* 3.3*   CL 97* 100 98   CO2 23 22 25   GLUCOSE 137* 149* 152*   BUN 27* 23 24*   CREATININE 1.14 0.99 0.93   MG  --  1.9  --    ANIONGAP 14 15 14   LABGLOM >60 >60 >60   GFRAA >60 >60 >60   CALCIUM 8.8 8.9 9.0     Recent Labs     22  0857   PROT 7.1   LABALBU 3.5   *   *   ALKPHOS 76   BILITOT 1.43*     ABG:  Lab Results   Component Value Date    POCPH 7.414 2022    POCPCO2 40.9 2022    POCPO2 52.8 2022    POCHCO3 26.1 2022    NBEA NOT REPORTED 2022    PBEA 1 2022    [id number] NOT REPORTED 2022    RTLC7XCM 87 2022    FIO2 NOT REPORTED 2022     Lab Results   Component Value Date/Time    SPECIAL NOT REPORTED 2012 06:02 PM     Lab Results   Component Value Date/Time    CULTURE NO GROWTH 2012 06:02 PM    CULTURE  2012 06:02 PM     Performed at [facility] [address] [address] [phone]       Radiology:  CT HEAD WO CONTRAST    Result Date: 2022  No acute intracranial abnormality. RECOMMENDATIONS: Unavailable     XR CHEST PORTABLE    Result Date: 2022  Bilateral lower lung field reticulonodular prominence which is nonspecific and may represent bronchiolitis, fibrosis, COVID-19 pneumonia or other infectious/inflammatory process. Physical Examination:        General appearance:  alert, combative and no distress  Mental Status: alert to self and place. Cannot be fully assessed due to orientation  Lungs: decreased breath sounds bilaterally, normal effort  Heart:  regular rate and rhythm, no murmur  Abdomen:  soft, nontender, nondistended, normal bowel sounds, no masses, hepatomegaly, splenomegaly  Extremities:  no edema, redness, tenderness in the calves  Skin:  no gross lesions, rashes, induration    Assessment:        Hospital Problems           Last Modified POA    * (Principal) Pneumonia due to COVID-19 virus 1/9/2022 Yes    AMS (altered mental status) 1/9/2022 Yes    COPD without exacerbation ([facility] [address] 1/9/2022 Yes    A-fib ([facility] [address] 1/9/2022 Yes    Gastroesophageal reflux disease without esophagitis 1/9/2022 Yes    Neuropathy 1/9/2022 Yes    JO (acute kidney injury) ([facility] [address] 1/9/2022 Yes    Elevated troponin 1/9/2022 Yes    Encephalopathy due to COVID-19 virus 1/9/2022 Yes          Plan:        1. Continue Seroquel BID and ativan PRN for combative behavior. Renew order for bilateral wrist restraints, continue bedside sitter. If patient continues to be aggressive with staff may need to consider transfer to ICU for precedex drip   2. Continue oral decadron  3. Supplemental oxygen, on vent-imask at 6L, wean as able  4. Continue GI and DVT prophylaxis  5. Trend labs, monitor inflammatory markers and correct electrolytes as needed. Potassium 3.3 today, will give oral replacement   6. Renal function has returned to normal   7. Continue IV hydration until oral intake improves   8. Continue home Exelon  9. KUB unremarkable, GI panel negative  10. Continue protonix   11. Continue FMS for frequent loose stools  12. Monitor and control BP- continue IV lopressor PRN with parameters   13.  Plan discussed with staff       PATRICK Foley NP  1/13/2022  12:10 PM

## 2022-01-13 NOTE — PROGRESS NOTES
Occupational Therapy   Occupational Therapy Initial Assessment  Date: 2022   Patient Name: Yolis Ridley  MRN: [id number]     : 1960    Date of Service: 2022    Discharge Recommendations:  Patient would benefit from continued therapy after discharge Pt currently functioning below baseline. Would suggest additional therapy at time of discharge to maximize long term outcomes and prevent re-admission. Please refer to AM-PAC score for current level of function. 49-year-old male was sent to the emergency department today for complaints of altered mental status. Patient also had a fall at home. Patient denies any specific complaints of chest pain or shortness of breath at this time, he states that he does not know the date but he does not feel like he would typically know the date because he does not leave his house. Patient denies any dysuria or hematuria, he denies any fevers or chills. He describes his symptoms as severe.         RN reports patient is medically stable for therapy treatment this date. Chart reviewed prior to treatment and patient is agreeable for therapy. All lines intact and patient positioned comfortably at end of treatment. All patient needs addressed prior to ending therapy session. Assessment   Performance deficits / Impairments: Decreased functional mobility ; Decreased ADL status; Decreased strength;Decreased safe awareness;Decreased cognition;Decreased endurance;Decreased sensation;Decreased balance;Decreased posture  Prognosis: Fair  Decision Making: High Complexity  OT Education: OT Role;Plan of Care;Home Exercise Program;Precautions; ADL Adaptive Strategies;Transfer Training;Energy Conservation;Equipment; Family Education;Orientation  Patient Education: attempting ed on importance of moving, ROM, cognitive reorientation, sensory stimulation with lights on/blinds opened, pressure relief  Barriers to Learning: cognition, tending to be agitated/agressive  REQUIRES OT FOLLOW UP: Yes  Activity Tolerance  Activity Tolerance: Treatment limited secondary to decreased cognition  Safety Devices  Safety Devices in place: Yes  Type of devices: Call light within reach;Nurse notified; Left in bed;Patient at risk for falls; Bed alarm in place           Patient Diagnosis(es): The primary encounter diagnosis was Altered mental status, unspecified altered mental status type. Diagnoses of Acute respiratory failure with hypercapnia ([address] and COVID-19 were also pertinent to this visit. has a past medical history of Anxiety, Arthritis, Atrial fib/flutter, transient, Bradycardia, COPD (chronic obstructive pulmonary disease) ([facility] [address] Dementia ([facility] [address] Depression, GERD (gastroesophageal reflux disease), Neuropathy, Parkinson's disease ([facility] [address] Pneumonia, and Syncope.   has a past surgical history that includes Breast surgery; Cholecystectomy; back surgery; Cardiac catheterization (2010); Tonsillectomy; Hand surgery (Right); Cardiac pacemaker placement (); ablation of dysrhythmic focus (11-); and transesophageal echocardiogram (11-). Restrictions  Restrictions/Precautions  Restrictions/Precautions: General Precautions,Fall Risk  Position Activity Restriction  Other position/activity restrictions: pt in wrist restraints, has 1:1 sitter    Subjective   General  Chart Reviewed: Yes  Patient assessed for rehabilitation services?: Yes  Family / Caregiver Present: No (1:1 sitter present)  Oxygen Therapy  SpO2: (!) 88 %  O2 Device: Venturi-mask  FiO2 : 50 %  O2 Flow Rate (L/min): 6 L/min  Social/Functional History  Social/Functional History  Lives With: Spouse  Additional Comments: pt is too confused to provide history. Pt is  and per chart: Patient lives with wife and has 02 at 2-3 liters 24/7 thru [facility] [place]. He also has shower seat, walker, cane and w/c in the home.   Spouse wishes to take home as he has been in snf in past and did not have a good experience       Objective Vision:  (unable to assess)    Orientation  Overall Orientation Status: Impaired  Orientation Level: Oriented to person;Disoriented to situation;Disoriented to time;Oriented to place  Observation/Palpation  Posture:  (unable to assess)  Observation: pt lying in bed, soft wrist restraints, 1:1 sitter, venti mask 6L O2. Pt is somewhat conversant, but confused. Agreeable to some ROM in bed and repositioning  Balance  Sitting Balance: Unable to assess(comment)  Standing Balance: Unable to assess(comment)  Standing Balance  Time: unable to tolerate assessment of sitting or standing this session. Will reassess as able  ADL  Feeding: Maximum assistance  Grooming: Maximum assistance  UE Bathing: Maximum assistance  LE Bathing: Dependent/Total  UE Dressing: Maximum assistance  LE Dressing: Dependent/Total  Toileting: Dependent/Total  Additional Comments: pt is limited by cognition, in restraints, unpredictable in terms of removing restraints and attempting functional tasks. Pt has been combative with staff . Tone RUE  RUE Tone: Normotonic  Tone LUE  LUE Tone: Normotonic     Bed mobility  Scooting: Maximal assistance;Dependent/Total;2 Person assistance  Comment: pt needing max A x 2 with bed in trendelenberg position to scoot pt up higher in bed . Pt encouraged to bend knees and attempt to push with feet.  Minimal help given by pt  Transfers  Sit to stand: Unable to assess  Stand to sit: Unable to assess     Cognition  Overall Cognitive Status: Exceptions  Arousal/Alertness: Inconsistent responses to stimuli  Following Commands: Inconsistently follows commands  Attention Span: Difficulty attending to directions  Memory: Decreased recall of biographical Information;Decreased recall of recent events  Safety Judgement: Decreased awareness of need for assistance;Decreased awareness of need for safety  Problem Solving: Assistance required to generate solutions;Assistance required to implement solutions;Decreased awareness of errors;Assistance required to correct errors made;Assistance required to identify errors made  Insights: Not aware of deficits  Initiation: Requires cues for all  Sequencing: Requires cues for all                 LUE AROM (degrees)  LUE General AROM: AAROM B UE's WFLs; pt encouraged to move hands/wrists/elbows as able and when out of restraints with therapy session, pt assisted wtih B shoulder ROM  LUE Strength  LUE Strength Comment: unable to assess strength d/t cognition. Appears WFLs                   Plan   Plan  Times per week: 3-4x/week  Current Treatment Recommendations: Strengthening,Balance Training,Functional Mobility Training,Endurance Training,Safety Education & Training,Self-Care / ADL,Patient/Caregiver Education & Training,Equipment Evaluation, Education, & procurement,Positioning,Cognitive Reorientation,Cognitive/Perceptual Training       AM-[facility] Inpatient Daily Activity Raw Score: 8 (01/13/22 1505)  AM-PAC Inpatient ADL T-Scale Score : 22.86 (01/13/22 1505)  ADL Inpatient CMS 0-100% Score: 85.69 (01/13/22 1505)  ADL Inpatient CMS G-Code Modifier : CM (01/13/22 1505)    Goals  Short term goals  Time Frame for Short term goals: by discharge, pt will  Short term goal 1: tolerate reassessment of ADL transfers/mob as tolerated  Short term goal 2: demo min A with simple grooming tasks and self feeding following set up and min cues for initiation/sequencing  Short term goal 3: participate in AROM of UEs for inc strength for mob/ADLs  Short term goal 4: demo mod Ax 1 with bed mob with rails/controls  Patient Goals   Patient goals : not stated       Therapy Time   Individual Concurrent Group Co-treatment   Time In 1140         Time Out 1210         Minutes 30          treatment min: 18  Co-treatment with PT warranted secondary to decreased safety and independence requiring 2 skilled therapy professionals to address individual discipline's goals.  OT addressing preparation for ADL transfer, functional reaching, environmental safety/scanning, ability to sequence and follow directions and functional UE strength.        Tania Gray, OT

## 2022-01-13 NOTE — PROGRESS NOTES
Physical Therapy    Facility/Department: [facility] PROGRESSIVE CARE  Initial Assessment    NAME: Tara Scott  : 1960  MRN: [id number]    Date of Service: 2022    Discharge Recommendations:  Patient would benefit from continued therapy after discharge     Pt currently functioning below baseline. Would suggest additional therapy at time of discharge to maximize long term outcomes and prevent re-admission. Please refer to AM-PAC score for current level of function. Assessment   Body structures, Functions, Activity limitations: Decreased functional mobility ; Decreased ADL status; Decreased strength;Decreased safe awareness;Decreased cognition  Assessment: Patient limited by confusion and lethargy. Patient released from UE restraints during treatment, but restraints replaced at end of visit. Patient will benefit from skilled PT to improve strength and be reassessed for mobility. Prognosis: Good  Decision Making: High Complexity  PT Education: Goals;PT Role;Plan of Care  Patient Education: Patient demo poor retention of education. REQUIRES PT FOLLOW UP: Yes  Activity Tolerance  Activity Tolerance: Patient limited by cognitive status       Patient Diagnosis(es): The primary encounter diagnosis was Altered mental status, unspecified altered mental status type. Diagnoses of Acute respiratory failure with hypercapnia ([address] and COVID-19 were also pertinent to this visit. has a past medical history of Anxiety, Arthritis, Atrial fib/flutter, transient, Bradycardia, COPD (chronic obstructive pulmonary disease) ([address] Dementia ([address] Depression, GERD (gastroesophageal reflux disease), Neuropathy, Parkinson's disease ([address] Pneumonia, and Syncope.   has a past surgical history that includes Breast surgery; Cholecystectomy; back surgery; Cardiac catheterization (); Tonsillectomy;  Hand surgery (Right); Cardiac pacemaker placement (); ablation of dysrhythmic focus (2014); and transesophageal echocardiogram (11-). Restrictions  Restrictions/Precautions  Restrictions/Precautions: General Precautions,Fall Risk  Position Activity Restriction  Other position/activity restrictions: pt in wrist restraints, has 1:1 sitter  Vision/Hearing        Subjective  General  Chart Reviewed: Yes  Patient assessed for rehabilitation services?: Yes  Additional Pertinent Hx: Dementia, 2 liters home 02  Response To Previous Treatment: Not applicable  Family / Caregiver Present: No  Diagnosis: Encephalopathy, COVID, AMS  Follows Commands: Impaired  General Comment  Comments: Merlin Ensign, RN reports patient appropriate for ROM as he has been less combative. Subjective  Subjective: Patient lethargic, but responsive to stimuli. Pain Screening  Patient Currently in Pain: No     Pre Treatment Pain Screening  Intervention List: Patient able to continue with treatment    Orientation  Orientation  Overall Orientation Status: Impaired  Orientation Level: Oriented to person;Oriented to place; Disoriented to time;Disoriented to situation  Social/Functional History  Social/Functional History  Lives With: Spouse  Additional Comments: pt is too confused to provide history. Pt is  and per chart: Patient lives with wife and [facility] He also has shower seat, walker, cane and w/c in the home.   Spouse wishes to take home as he has been in snf in past and did not have a good experience  Cognition   Cognition  Overall Cognitive Status: Exceptions  Arousal/Alertness: Inconsistent responses to stimuli  Following Commands: Inconsistently follows commands  Attention Span: Difficulty attending to directions  Memory: Decreased recall of biographical Information;Decreased recall of recent events  Safety Judgement: Decreased awareness of need for assistance;Decreased awareness of need for safety  Problem Solving: Assistance required to generate solutions;Assistance required to implement solutions;Decreased awareness of errors;Assistance required to correct errors made;Assistance required to identify errors made  Insights: Not aware of deficits  Initiation: Requires cues for all  Sequencing: Requires cues for all    Objective     Observation/Palpation  Observation: pt lying in bed, soft wrist restraints, 1:1 sitter, venti mask 6L O2. Pt is somewhat conversant, but confused. Agreeable to some ROM in bed and repositioning    AROM RLE (degrees)  RLE AROM: WFL  AROM LLE (degrees)  LLE AROM : WFL  Strength Other  Other: unable to follow directions for MMT, functional demo 2+/5 BLE strength. Motor Control  Gross Motor?: Exceptions  Comments: decreased motor planning     Bed mobility  Comment: Max x 2 to scoot patient up in bed in trendelenburg position, patient given verbal and manual cues to use LEs to assist, but very minimal follow through. Transfers  Comment: Not appropriate  Ambulation  Ambulation?: No        Exercises  Comments: BLE AAROM in all planes. Plan   Plan  Times per week: 3-5x/wk  Current Treatment Recommendations: Strengthening,ROM,Safety Education & Training,Patient/Caregiver Education & Training,Cognitive Reorientation  Safety Devices  Type of devices: All fall risk precautions in place,Gait belt,Nurse notified,Call light within reach,Left in bed,Bed alarm in place,Sitter present  Restraints  Initially in place: Yes  Restraints: UE restraints    OutComes Score    AM-PAC Score  AM-PAC Inpatient Mobility Raw Score : 7 (01/13/22 1609)  -PAC Inpatient T-Scale Score : 26.42 (01/13/22 1609)  Mobility Inpatient CMS 0-100% Score: 92.36 (01/13/22 1609)  Mobility Inpatient CMS G-Code Modifier : CM (01/13/22 1609)          Goals  Short term goals  Time Frame for Short term goals: 12 visits  Short term goal 1: Patient will tolerate ther-ex x 30 minutes. Short term goal 2: Patient will be assessed for mobility when appropriate. Short term goal 3: Patient will be indep with HEP.   Short term goal 4: Patient will be provided with cognitive stimulation.   Patient Goals   Patient goals : none stated       Therapy Time   Individual Concurrent Group Co-treatment   Time In 1140         Time Out 1210         Minutes 30         Timed Code Treatment Minutes: [address], PT

## 2022-01-13 NOTE — RT PROTOCOL NOTE
RT Inhaler-Nebulizer Bronchodilator Protocol Note    There is a bronchodilator order in the chart from a provider indicating to follow the RT Bronchodilator Protocol and there is an Initiate RT Inhaler-Nebulizer Bronchodilator Protocol order as well (see protocol at bottom of note). CXR Findings:  XR CHEST PORTABLE    Result Date: 1/12/2022  Mild interval increase in right lower lung field opacities, when compared to the previous study performed 1 day earlier. The findings from the last RT Protocol Assessment were as follows:   History Pulmonary Disease: Chronic pulmonary disease  Respiratory Pattern: Regular pattern and RR 12-20 bpm  Breath Sounds: Slightly diminished and/or crackles  Cough: Weak, productive  Indication for Bronchodilator Therapy: On home bronchodilators  Bronchodilator Assessment Score: 6    Aerosolized bronchodilator medication orders have been revised according to the RT Inhaler-Nebulizer Bronchodilator Protocol below. Respiratory Therapist to perform RT Therapy Protocol Assessment initially then follow the protocol. Repeat RT Therapy Protocol Assessment PRN for score 0-3 or on second treatment, BID, and PRN for scores above 3. No Indications - adjust the frequency to every 6 hours PRN wheezing or bronchospasm, if no treatments needed after 48 hours then discontinue using Per Protocol order mode. If indication present, adjust the RT bronchodilator orders based on the Bronchodilator Assessment Score as indicated below. Use Inhaler orders unless patient has one or more of the following: on home nebulizer, not able to hold breath for 10 seconds, is not alert and oriented, cannot activate and use MDI correctly, or respiratory rate 25 breaths per minute or more, then use the equivalent nebulizer order(s) with same Frequency and PRN reasons based on the score. If a patient is on this medication at home then do not decrease Frequency below that used at home.     0-3 - enter or revise RT bronchodilator order(s) to equivalent RT Bronchodilator order with Frequency of every 4 hours PRN for wheezing or increased work of breathing using Per Protocol order mode. 4-6 - enter or revise RT Bronchodilator order(s) to two equivalent RT bronchodilator orders with one order with BID Frequency and one order with Frequency of every 4 hours PRN wheezing or increased work of breathing using Per Protocol order mode. 7-10 - enter or revise RT Bronchodilator order(s) to two equivalent RT bronchodilator orders with one order with TID Frequency and one order with Frequency of every 4 hours PRN wheezing or increased work of breathing using Per Protocol order mode. 11-13 - enter or revise RT Bronchodilator order(s) to one equivalent RT bronchodilator order with QID Frequency and an Albuterol order with Frequency of every 4 hours PRN wheezing or increased work of breathing using Per Protocol order mode. Greater than 13 - enter or revise RT Bronchodilator order(s) to one equivalent RT bronchodilator order with every 4 hours Frequency and an Albuterol order with Frequency of every 2 hours PRN wheezing or increased work of breathing using Per Protocol order mode. RT to enter RT Home Evaluation for COPD & MDI Assessment order using Per Protocol order mode.     Electronically signed by Jojo Plummer RCP on 1/13/2022 at 2:37 PM

## 2022-01-13 NOTE — RT PROTOCOL NOTE
nebulizer order(s) with same Frequency and PRN reasons based on the score. If a patient is on this medication at home then do not decrease Frequency below that used at home. 0-3 - enter or revise RT bronchodilator order(s) to equivalent RT Bronchodilator order with Frequency of every 4 hours PRN for wheezing or increased work of breathing using Per Protocol order mode. 4-6 - enter or revise RT Bronchodilator order(s) to two equivalent RT bronchodilator orders with one order with BID Frequency and one order with Frequency of every 4 hours PRN wheezing or increased work of breathing using Per Protocol order mode. 7-10 - enter or revise RT Bronchodilator order(s) to two equivalent RT bronchodilator orders with one order with TID Frequency and one order with Frequency of every 4 hours PRN wheezing or increased work of breathing using Per Protocol order mode. 11-13 - enter or revise RT Bronchodilator order(s) to one equivalent RT bronchodilator order with QID Frequency and an Albuterol order with Frequency of every 4 hours PRN wheezing or increased work of breathing using Per Protocol order mode. Greater than 13 - enter or revise RT Bronchodilator order(s) to one equivalent RT bronchodilator order with every 4 hours Frequency and an Albuterol order with Frequency of every 2 hours PRN wheezing or increased work of breathing using Per Protocol order mode. RT to enter RT Home Evaluation for COPD & MDI Assessment order using Per Protocol order mode.     Electronically signed by Keyon Morris RCP on 1/13/2022 at 7:25 AM

## 2022-01-13 NOTE — PLAN OF CARE
Problem: Non-Violent Restraints  Goal: No harm/injury to patient while restraints in use  1/13/2022 1122 by Rochelle Pollard RN  Outcome: Ongoing  Patient on bilateral soft wrist restraints. ROM q hour. PRN meds ordered for agitation. Patient still confused but appears more alert today. Following some commands but still impulsive at times. Mepilex right wrist changed and blister healing and looks better. No signs of injury noted. Patient one on one.

## 2022-01-13 NOTE — PLAN OF CARE
Problem: Non-Violent Restraints  Goal: Removal from restraints as soon as assessed to be safe  Outcome: Ongoing  Goal: No harm/injury to patient while restraints in use  Outcome: Ongoing  Goal: Patient's dignity will be maintained  Outcome: Ongoing     Problem: Skin Integrity:  Goal: Absence of new skin breakdown  Description: Absence of new skin breakdown  Outcome: Ongoing     Problem: Falls - Risk of:  Goal: Will remain free from falls  Description: Will remain free from falls  Outcome: Ongoing  Goal: Absence of physical injury  Description: Absence of physical injury  Outcome: Ongoing     Problem: Gas Exchange - Impaired  Goal: Absence of hypoxia  Outcome: Ongoing     Problem: Breathing Pattern - Ineffective  Goal: Ability to achieve and maintain a regular respiratory rate  Outcome: Ongoing     Problem: Isolation Precautions - Risk of Spread of Infection  Goal: Prevent transmission of infection  Outcome: Ongoing     Problem: Confusion - Acute:  Goal: Absence of continued neurological deterioration signs and symptoms  Description: Absence of continued neurological deterioration signs and symptoms  Outcome: Ongoing     Problem: Injury - Risk of, Physical Injury:  Goal: Will remain free from falls  Description: Will remain free from falls  Outcome: Ongoing  Goal: Absence of physical injury  Description: Absence of physical injury  Outcome: Ongoing     Problem: Mood - Altered:  Goal: Mood stable  Description: Mood stable  Outcome: Ongoing  Goal: Absence of abusive behavior  Description: Absence of abusive behavior  Outcome: Ongoing  Goal: Verbalizations of feeling emotionally comfortable while being cared for will increase  Description: Verbalizations of feeling emotionally comfortable while being cared for will increase  Outcome: Ongoing     Problem: Sleep Pattern Disturbance:  Goal: Appears well-rested  Description: Appears well-rested  Outcome: Ongoing     Chapa Brandies is 1:1 with PCA at this time, awake and upset, attempting to exit the bed. He has wrist restraints in loosest position to assist with maintaining rectal tube and oxygen tubing, as well as to assist in staff safety. No new skin issues noted to this point in shift. Patient is grabbing and holding things tightly; staff attempting to provide appropriate things for him to squeeze. He is able to be redirected at times, and has allowed ROM and personal care/rotating side to side with occasional combative behaviors during care. He attempts to kick staff when he gets upset. Staff leave patient alone and re-approach when patient becomes agitated and combative. He has been calling staff inappropriate names throughout the evening. Sometimes, staff unable to understand what he is saying r/t low vocal tone and when asked if he can speak up, he states he can't; however, he is able to yell out when he has a specific need or wants to yell at staff. PRN Ativan administered x2. Have attempted to reassure patient and encouraged him to sleep, which he admits he needs to do, but can't. BP and respirations elevated during shift; PRN Metoprolol given x1. O2 via venti mask in place and effective to maintain saturations >90%. Isolation precautions in place. Staff at bedside; safety maintained.

## 2022-01-13 NOTE — PROGRESS NOTES
Pulmonary Critical Care Progress Note  BRAULIO Portillo/Alejandro Diana MD     Patient seen for the follow up of acute hypoxic respiratory insufficiency, Pneumonia due to COVID-19 virus     Subjective:  He is resting in bed, 1 sitter at bedside, remains intermittently combative and confused but he does seem more alert today than he has previously. He remains in restraints secondary to pulling at lines and being combative. He is is wearing questions appropriately and cooperative with exam at this time. He is on Venturi mask at 6 L, SPO2 87 to 89% at this time    Examination:  Vitals: BP (!) 151/101   Pulse 72   Temp 99.1 °F (37.3 °C) (Axillary)   Resp 30   Ht 5' 11\" (1.803 m)   Wt 266 lb 1 oz (120.7 kg)   SpO2 95%   BMI 37.11 kg/m²   General appearance: In bed, confused but more alert than yesterday  Neck: No JVD  Lungs: Moderate to decreased air exchange, no crackles or wheezing  Heart: regular rate and rhythm, S1, S2 normal, no gallop  Abdomen: Soft, non tender, + BS  Extremities: no cyanosis or clubbing.  No significant edema    LABs:  BMP:   Recent Labs     01/12/22  0531 01/13/22  0857    137   K 3.5* 3.3*   CO2 22 25   BUN 23 24*   CREATININE 0.99 0.93   LABGLOM >60 >60   GLUCOSE 149* 152*     ABG:  Lab Results   Component Value Date    [id number] NOT REPORTED 01/09/2022    FIO2 NOT REPORTED 01/09/2022       Lab Results   Component Value Date    POCPH 7.414 01/09/2022    POCPCO2 40.9 01/09/2022    POCPO2 52.8 01/09/2022    POCHCO3 26.1 01/09/2022    NBEA NOT REPORTED 01/09/2022    PBEA 1 01/09/2022    [id number] NOT REPORTED 01/09/2022    RFGL9ZTD 87 01/09/2022    FIO2 NOT REPORTED 01/09/2022     Radiology:  X-ray chest 1/12/2022      Impression:  · Acute hypoxic respiratory insufficiency  · COVID-19 infection  · Altered mental status/encephalopathy  · Acute kidney injury  · Suspected obstructive sleep apnea/Obesity  · A. fib, anxiety, arthritis, GERD    Recommendations:  · Airborne isolation  · Oxygen via nasal cannula to keep SPO2 90% or greater  · BiPAP support as necessary  · Incentive spirometry every hour while wake  · Prone as tolerated  · Decadron  · Albuterol via nebulizer four times a day  · Encourage oral intake  · Nephrology signed off. · Monitor mental status  · Seroquel 50 mg twice daily  · Labs: CBC and BMP in am  · DVT prophylaxis with subcu heparin  · Discussed with RN  · Above assessment and plan will be reviewed with Dr. Kathryn Paz.   Patient plan will be finalized following review by Dr. Kathryn Paz  · Will follow with you    PATRICK Wilson-CNP   Pulmonary Critical Care and Sleep Medicine,  Patient seen under the supervision of Chase Fernandez MD, [facility]

## 2022-01-14 NOTE — PROGRESS NOTES
[facility]  Office: [address], DO, Eben Balderas, DO, Claudia Bopoornima, DO, Jeannette Trivedi Blood, DO, Helena Nye MD, Caral Fontaine MD, David Almazan MD, Nina Gonzalez MD, Divine León MD, Dionte Richmond MD, Carina Morejon MD, Brendan Zamora, DO, Kari Smith, DO, Cristela Ortega MD,  Malachi Frederick, DO, Philemon Simmonds, MD, Creed Ahumada, MD, Marlow Cowden, MD, Roseline Eastman MD, Ronel Ren MD, Tk Vidal MD, Pam Giles MD, Reema So, [facility], [facility], [facility], Bessie Cuevas, CNP, Javed Ashford, CNS, Mundo Trujillo, CNP, Fran Rowland, CNP, Linh Limon, CNP, Derrick Coffey, CNP, Aster Garcia, CNP, Ivett Sequeira PA-C, Aster Slaughter, [facility], Dorita Denise, [facility], Shakira Barboza, CNP, Baldwin Essex, CNP, Raúl Feliz, CNP, Richie Palma, CNP, Juwan Renteria, CNP, [facility]    Progress Note    1/14/2022    7:52 AM    Name:   Mu Bullock  MRN:     [id number]     Acct:      [de-identified]   Room:   [address] [place] [address] Day:  6  Admit Date:  1/8/2022  3:33 PM    PCP:   Efraín Cole MD  Code Status:  Full Code    Subjective:     C/C:   Chief Complaint   Patient presents with    Altered Mental Status     Interval History Status: improved. Patient seen and examined at bedside, no acute events overnight. Continue to improve overall with better breathing. Patient denies any chest pain, shortness of breath, chills, fevers, nausea or vomiting. Patient vitals, labs and all providers notes were reviewed,from overnight shift and morning updates were noted and discussed with the nurse      Brief History: This is a 53-year-old male that presents with altered mental status. He has been found to have encephalopathy as well as COVID-19 infection. He is admitted for further evaluation and management. He has required oxygen but frequently pulls off.   He states he is not vaccinated as he does not believe he should have the vaccine for 4 to 5 years according to the news story that he had seen on television. Review of Systems:     Review of Systems   Constitutional: Positive for activity change, appetite change and fatigue. Negative for chills, diaphoresis and fever. HENT: Negative for congestion. Eyes: Negative for visual disturbance. Respiratory: Positive for shortness of breath. Negative for cough, chest tightness and wheezing. Cardiovascular: Negative for chest pain, palpitations and leg swelling. Gastrointestinal: Negative for abdominal pain, blood in stool, constipation, diarrhea, nausea and vomiting. Genitourinary: Negative for difficulty urinating. Neurological: Positive for weakness. Negative for dizziness, light-headedness, numbness and headaches. Psychiatric/Behavioral: Positive for confusion (improving ). All other systems reviewed and are negative. Medications: Allergies:     Allergies   Allergen Reactions    Latex     Bee Venom     Prednisone Other (See Comments)     Mood swings       Current Meds:   Scheduled Meds:    albuterol  2.5 mg Nebulization BID    dexamethasone  6 mg Oral Daily    pantoprazole  40 mg IntraVENous Daily    And    sodium chloride (PF)  10 mL IntraVENous Daily    QUEtiapine  50 mg Oral BID    ziprasidone  20 mg IntraMUSCular Once    And    sterile water  1.2 mL IntraMUSCular Once    heparin (porcine)  5,000 Units SubCUTAneous 3 times per day    busPIRone  15 mg Oral Daily    DULoxetine  60 mg Oral Daily    finasteride  5 mg Oral Daily    cetirizine  10 mg Oral Daily    rivastigmine  1 patch TransDERmal Daily    [Held by provider] spironolactone  25 mg Oral BID    sodium chloride flush  5-40 mL IntraVENous 2 times per day    Vitamin D  2,000 Units Oral Daily     Continuous Infusions:    sodium chloride       PRN Meds: metoprolol, LORazepam **OR** LORazepam, sodium chloride flush, sodium chloride, ondansetron **OR** ondansetron, magnesium hydroxide, acetaminophen **OR** acetaminophen, guaiFENesin-dextromethorphan    Data:     Past Medical History:   has a past medical history of Anxiety, Arthritis, Atrial fib/flutter, transient, Bradycardia, COPD (chronic obstructive pulmonary disease) ([facility] Dementia ([facility] Depression, GERD (gastroesophageal reflux disease), Neuropathy, Parkinson's disease ([facility] Pneumonia, and Syncope. Social History:   reports that he has quit smoking. His smoking use included cigarettes. He has never used smokeless tobacco. He reports that he does not drink alcohol and does not use drugs. Family History:   Family History   Family history unknown: Yes       Vitals:  BP (!) 137/98   Pulse 95   Temp 99 °F (37.2 °C) (Axillary)   Resp 20   Ht 5' 11\" (1.803 m)   Wt 287 lb 7 oz (130.4 kg)   SpO2 92%   BMI 40.09 kg/m²   Temp (24hrs), Av °F (37.2 °C), Min:98.5 °F (36.9 °C), Max:99.1 °F (37.3 °C)    No results for input(s): POCGLU in the last 72 hours. I/O (24Hr):     Intake/Output Summary (Last 24 hours) at 2022 0752  Last data filed at 2022 0408  Gross per 24 hour   Intake --   Output 1000 ml   Net -1000 ml       Labs:  Hematology:  Recent Labs     22  0857   WBC 4.9   RBC 5.76   HGB 15.1   HCT 46.7   MCV 81.1*   MCH 26.2   MCHC 32.3   RDW 14.3      MPV 9.3   CRP 9.3*     Chemistry:  Recent Labs     22  0531 22  0857    137   K 3.5* 3.3*    98   CO2 22 25   GLUCOSE 149* 152*   BUN 23 24*   CREATININE 0.99 0.93   MG 1.9  --    ANIONGAP 15 14   LABGLOM >60 >60   GFRAA >60 >60   CALCIUM 8.9 9.0     Recent Labs     22  0857   PROT 7.1   LABALBU 3.5   *   *   ALKPHOS 76   BILITOT 1.43*     ABG:  Lab Results   Component Value Date    POCPH 7.414 2022    POCPCO2 40.9 2022    POCPO2 52.8 2022    POCHCO3 26.1 2022    NBEA NOT REPORTED 2022    PBEA 1 2022    YKQ7QCV NOT REPORTED 2022    LVIF9UOY 87 2022 FIO2 NOT REPORTED 01/09/2022     Lab Results   Component Value Date/Time    SPECIAL NOT REPORTED 03/30/2012 06:02 PM     Lab Results   Component Value Date/Time    CULTURE NO GROWTH 03/30/2012 06:02 PM    CULTURE  03/30/2012 06:02 PM     Performed at [facility] [id number] [place] [address] Kingsley Baird  [phone]       Radiology:  XR ABDOMEN (KUB) (SINGLE AP VIEW)    Result Date: 1/11/2022  Nonobstructive bowel gas pattern. CT HEAD WO CONTRAST    Result Date: 1/8/2022  No acute intracranial abnormality. RECOMMENDATIONS: Unavailable     XR CHEST PORTABLE    Result Date: 1/12/2022  Mild interval increase in right lower lung field opacities, when compared to the previous study performed 1 day earlier. XR CHEST PORTABLE    Result Date: 1/11/2022  No significant interval change, when compared to the previous study performed 01/08/2022, as described above. XR CHEST PORTABLE    Result Date: 1/8/2022  Bilateral lower lung field reticulonodular prominence which is nonspecific and may represent bronchiolitis, fibrosis, COVID-19 pneumonia or other infectious/inflammatory process. US RETROPERITONEAL COMPLETE    Result Date: 1/10/2022  Nonvisualization of the right kidney. Limited exam as described. Otherwise unremarkable exam. RECOMMENDATIONS: Unavailable       Physical Examination:        Physical Exam  Vitals and nursing note reviewed. Constitutional:       General: He is not in acute distress. HENT:      Head: Normocephalic and atraumatic. Eyes:      Conjunctiva/sclera: Conjunctivae normal.      Pupils: Pupils are equal, round, and reactive to light. Cardiovascular:      Rate and Rhythm: Normal rate and regular rhythm. Heart sounds: No murmur heard. Pulmonary:      Effort: Pulmonary effort is normal. No accessory muscle usage or respiratory distress. Breath sounds: No stridor. No decreased breath sounds, wheezing, rhonchi or rales.    Abdominal:      General: Bowel sounds are normal. There is no distension. Palpations: Abdomen is soft. Abdomen is not rigid. Tenderness: There is no abdominal tenderness. There is no guarding. Musculoskeletal:         General: No tenderness. Skin:     General: Skin is warm and dry. Findings: No erythema, lesion or rash. Neurological:      Mental Status: He is alert and oriented to person, place, and time. Cranial Nerves: No cranial nerve deficit. Motor: No seizure activity. Psychiatric:         Behavior: Behavior is cooperative.          Assessment:        Hospital Problems           Last Modified POA    * (Principal) Pneumonia due to COVID-19 virus 1/9/2022 Yes    AMS (altered mental status) 1/9/2022 Yes    COPD without exacerbation ([address] 1/9/2022 Yes    A-fib ([address] 1/9/2022 Yes    Gastroesophageal reflux disease without esophagitis 1/9/2022 Yes    Neuropathy 1/9/2022 Yes    JO (acute kidney injury) ([address] 1/9/2022 Yes    Elevated troponin 1/9/2022 Yes    COVID-19 1/13/2022 Yes    Acute respiratory failure with hypercapnia ([address] 1/13/2022 Yes          Plan:        COVID 19 Pneumonia :   Steroids   Vit D  Incentive spirometry  Titrate inflammatory markers     Acute hypoxemic respiratory failure: Likely secondary to Covid 19 pneumonia, continue supplemental oxygen    Dementia / PD at baseline: Continue chronic medications    Metabolic encephalopathy : secondary to above, improving, can try to remove restrain and reassess     COPD exacerbation: continue breathing treatment, continue steroids, mucolytics and home aerosols    JO : Resolved    Hypokalemia : replace and continue to monitor     AFIB / flutter : S/P pacer placement 2014     DVT and GI prophylaxis    Discussed with the patient and the nurse      Shannan Herman MD  1/14/2022  7:52 AM

## 2022-01-14 NOTE — PROGRESS NOTES
Pulmonary Critical Care Progress Note  BRAULIO Albright/Alejandro Diana MD     Patient seen for the follow up of acute hypoxic respiratory insufficiency, Pneumonia due to COVID-19 virus     Subjective:  He is resting in bed, 1 sitter at bedside. Right now he is fairly sleepy, just finished working with physical therapy. He is still in restraints secondary to pulling at his lines. He has been more appropriate and less agitated over the last 24 hours. He is on Venturi mask at 6 L, SPO2 88%. RT at bedside, going to switch him to a nonrebreather. Examination:  Vitals: BP (!) 154/95   Pulse 69   Temp 99 °F (37.2 °C) (Axillary)   Resp 18   Ht 5' 11\" (1.803 m)   Wt 287 lb 7 oz (130.4 kg)   SpO2 93%   BMI 40.09 kg/m²   General appearance: In bed, sleepy  Neck: No JVD  Lungs: Moderate to decreased air exchange, no crackles or wheezing  Heart: regular rate and rhythm, S1, S2 normal, no gallop  Abdomen: Soft, non tender, + BS  Extremities: no cyanosis or clubbing.  No significant edema    LABs:  BMP:   Recent Labs     01/13/22  0857 01/14/22  0830    135   K 3.3* 3.3*   CO2 25 23   BUN 24* 32*   CREATININE 0.93 0.93   LABGLOM >60 >60   GLUCOSE 152* 137*     ABG:  Lab Results   Component Value Date    DMU3HDU NOT REPORTED 01/09/2022    FIO2 NOT REPORTED 01/09/2022       Lab Results   Component Value Date    POCPH 7.414 01/09/2022    POCPCO2 40.9 01/09/2022    POCPO2 52.8 01/09/2022    POCHCO3 26.1 01/09/2022    NBEA NOT REPORTED 01/09/2022    PBEA 1 01/09/2022    FEH4WSG NOT REPORTED 01/09/2022    ZGJW0ZIE 87 01/09/2022    FIO2 NOT REPORTED 01/09/2022     Radiology:  X-ray chest 1/12/2022      Impression:  · Acute hypoxic respiratory insufficiency  · COVID-19 infection  · Altered mental status/encephalopathy  · Acute kidney injury  · Suspected obstructive sleep apnea/Obesity  · A. fib, anxiety, arthritis, GERD    Recommendations:  · Airborne isolation  · Oxygen via Ventimask secondary to mouth breathing, keep SPO2 90% or greater  · BiPAP support as necessary  · Incentive spirometry every hour while wake  · Prone as tolerated  · Decadron  · Albuterol via nebulizer four times a day  · Encourage oral intake  · Nephrology signed off. · Monitor mental status  · Seroquel 50 mg twice daily  · Labs: CBC and BMP in am  · DVT prophylaxis with subcu heparin  · PT/OT  · Discussed with RT  · Above assessment and plan will be reviewed with Dr. Abimael iRvas.   Patient plan will be finalized following review by Dr. Abimael Rivas  · Will follow with you    PATRICK Patel-CNP   Pulmonary Critical Care and Sleep Medicine,  Patient seen under the supervision of Alok Cobian MD, [facility]

## 2022-01-14 NOTE — PROGRESS NOTES
Occupational Therapy  Facility/Department: [facility] PROGRESSIVE CARE  Daily Treatment Note  NAME: Severa Skill  :   MRN: [id number]    Date of Service: 2022   RN Herbie Joe reports patient is medically stable for therapy treatment this date. Chart reviewed prior to treatment and patient is agreeable for therapy. All lines intact and patient positioned comfortably at end of treatment. All patient needs addressed prior to ending therapy session. Discharge Recommendations:  Patient would benefit from continued therapy after discharge   Pt currently functioning below baseline. Would suggest additional therapy at time of discharge to maximize long term outcomes and prevent re-admission. Please refer to AM-PAC score for current level of function. Assessment   Performance deficits / Impairments: Decreased functional mobility ; Decreased ADL status; Decreased strength;Decreased safe awareness;Decreased cognition;Decreased endurance;Decreased sensation;Decreased balance;Decreased posture  Assessment: Pt with deficits of strength, bed mobility, transfers,  balance, safety awareness and endurance this session,  & required 2 assist for safety & transfers. With current deficits, Pt HIGH risk for falls & requires continued OT to maximize independence with functional mobility, balance, safety awareness & activity tolerance. Prognosis: Fair  OT Education: OT Role;Plan of Care;Precautions; ADL Adaptive Strategies;Transfer Training;Energy Conservation  Patient Education: participation in therapy, pursed lip breathing, cognitive reorientation, sensory stimulation with lights on/blinds opened, pressure relief  Barriers to Learning: cognition, tending to be agitated/agressive  REQUIRES OT FOLLOW UP: Yes  Activity Tolerance  Activity Tolerance: Treatment limited secondary to decreased cognition;Patient limited by fatigue  Safety Devices  Safety Devices in place: Yes  Type of devices: Call light within reach;Nurse notified; Left in bed;Patient at risk for falls; Bed alarm in place         Patient Diagnosis(es): The primary encounter diagnosis was Altered mental status, unspecified altered mental status type. Diagnoses of Acute respiratory failure with hypercapnia ([address] and COVID-19 were also pertinent to this visit. has a past medical history of Anxiety, Arthritis, Atrial fib/flutter, transient, Bradycardia, COPD (chronic obstructive pulmonary disease) ([address] Dementia ([facility] Depression, GERD (gastroesophageal reflux disease), Neuropathy, Parkinson's disease ([facility] Pneumonia, and Syncope.   has a past surgical history that includes Breast surgery; Cholecystectomy; back surgery; Cardiac catheterization (2010); Tonsillectomy; Hand surgery (Right); Cardiac pacemaker placement (); ablation of dysrhythmic focus (11-); and transesophageal echocardiogram (11-).     Restrictions  Restrictions/Precautions  Restrictions/Precautions: General Precautions,Fall Risk  Position Activity Restriction  Other position/activity restrictions: pt in wrist restraints, has 1:1 sitter  Subjective   General  Chart Reviewed: Yes  Patient assessed for rehabilitation services?: Yes  Family / Caregiver Present: No (1:1 sitter present)  General Comment  Comments: Pt supine in bed agreeable  Vital Signs  Patient Currently in Pain: Yes (9/10 all throughout back)   Orientation  Orientation  Overall Orientation Status: Impaired  Orientation Level: Oriented to person;Disoriented to situation;Disoriented to time;Oriented to place  Objective             Balance  Sitting Balance: Minimal assistance (Min A required for balance d/t posterior/lateral leaning)  Standing Balance: Maximum assistance (x2 w/RW)  Standing Balance  Time: Approx 30 sec  Functional Mobility  Functional Mobility Comments: Not appriopriate at this time  Bed mobility  Rolling to Left: Dependent/Total  Rolling to Right: Dependent/Total  Supine to Sit: Maximum assistance;2 Person assistance  Sit to Supine: 2 Person assistance;Maximum assistance  Scooting: Maximal assistance;2 Person assistance  Comment: Pt requiring increased time/effort to complete and x2 staff for safe transfer to EOB. Max verbal/tactile cueing for proper tech, use of bed rails, pursed lip breathing, pacing, and awareness/assist with lines. Transfers  Sit to stand: Maximum assistance;2 Person assistance  Stand to sit: Maximum assistance;2 Person assistance  Transfer Comments: Pt able to complete 1 stand with Max A x2 w/RW. But only able to tolerate stand for approx 30 seconds d/t increased HR and fatigue. Pt's HR increased to 240 bpm and after resting EOB decreased to 117 bpm in seconds.  Pt's SpO2 decreased as low as 87-88% throughout treatment and remained at that range while supine in bed at end of treatment                       Cognition  Overall Cognitive Status: Exceptions  Arousal/Alertness: Inconsistent responses to stimuli  Following Commands: Inconsistently follows commands  Attention Span: Difficulty attending to directions  Memory: Decreased recall of biographical Information;Decreased recall of recent events  Safety Judgement: Decreased awareness of need for assistance;Decreased awareness of need for safety  Problem Solving: Assistance required to generate solutions;Assistance required to implement solutions;Decreased awareness of errors;Assistance required to correct errors made;Assistance required to identify errors made  Insights: Not aware of deficits  Initiation: Requires cues for all  Sequencing: Requires cues for all                                         Plan   Plan  Times per week: 3-4x/week  Current Treatment Recommendations: Strengthening,Balance Training,Functional Mobility Training,Endurance Training,Safety Education & Training,Self-Care / ADL,Patient/Caregiver Education & Training,Equipment Evaluation, Education, & procurement,Positioning,Cognitive Reorientation,Cognitive/Perceptual Training                                                  AM-PAC Score        AM-[facility] Inpatient Daily Activity Raw Score: 8 (01/14/22 1105)  AM-PAC Inpatient ADL T-Scale Score : 22.86 (01/14/22 1105)  ADL Inpatient CMS 0-100% Score: 85.69 (01/14/22 1105)  ADL Inpatient CMS G-Code Modifier : CM (01/14/22 1105)    Goals  Short term goals  Time Frame for Short term goals: by discharge, pt will  Short term goal 1: tolerate reassessment of ADL transfers/mob as tolerated  Short term goal 2: demo min A with simple grooming tasks and self feeding following set up and min cues for initiation/sequencing  Short term goal 3: participate in AROM of UEs for inc strength for mob/ADLs  Short term goal 4: demo mod Ax 1 with bed mob with rails/controls  Patient Goals   Patient goals : not stated       Therapy Time   Co-Treat Concurrent Group Co-treatment   Time In 0915         Time Out 0943         Minutes 29               Co-treatment with PT warranted secondary to decreased safety and independence requiring 2 skilled therapy professionals to address individual discipline's goals. OT addressing preparation for ADL transfer, sitting balance for increased ADL performance, sitting/activity tolerance, functional reaching, environmental safety/scanning, fall prevention, functional mobility for ADL transfers, ability to sequence and follow directions and functional UE strength. Upon writer exit, call light within reach, pt retired to bed. All lines intact and patient positioned comfortably. All patient needs addressed prior to ending therapy session. Chart reviewed prior to treatment and patient is agreeable for therapy. RN reports patient is medically stable for therapy treatment this date.       FERMIN Walter

## 2022-01-14 NOTE — PLAN OF CARE
Problem: Non-Violent Restraints  Goal: Removal from restraints as soon as assessed to be safe  1/13/2022 2333 by Kayla Marc RN  Outcome: Ongoing     Problem: Pain:  Goal: Pain level will decrease  Description: Pain level will decrease  1/13/2022 2333 by Kayla Marc RN  Outcome: Ongoing     Problem: Skin Integrity:  Goal: Will show no infection signs and symptoms  Description: Will show no infection signs and symptoms  1/13/2022 2333 by Kayla Marc RN  Outcome: Ongoing     Problem: Falls - Risk of:  Goal: Will remain free from falls  Description: Will remain free from falls  1/13/2022 2333 by Kayla Marc RN  Outcome: Ongoing     Problem: Airway Clearance - Ineffective  Goal: Achieve or maintain patent airway  1/13/2022 2333 by Kayla Marc RN  Outcome: Ongoing

## 2022-01-14 NOTE — RT PROTOCOL NOTE
RT Inhaler-Nebulizer Bronchodilator Protocol Note    There is a bronchodilator order in the chart from a provider indicating to follow the RT Bronchodilator Protocol and there is an Initiate RT Inhaler-Nebulizer Bronchodilator Protocol order as well (see protocol at bottom of note). CXR Findings:  XR CHEST PORTABLE    Result Date: 1/14/2022  No significant interval change, when compared to the previous study performed 01/12/2022. The findings from the last RT Protocol Assessment were as follows:   History Pulmonary Disease: Chronic pulmonary disease  Respiratory Pattern: Regular pattern and RR 12-20 bpm  Breath Sounds: Slightly diminished and/or crackles  Cough: Weak, productive  Indication for Bronchodilator Therapy: Decreased or absent breath sounds  Bronchodilator Assessment Score: 6    Aerosolized bronchodilator medication orders have been revised according to the RT Inhaler-Nebulizer Bronchodilator Protocol below. Respiratory Therapist to perform RT Therapy Protocol Assessment initially then follow the protocol. Repeat RT Therapy Protocol Assessment PRN for score 0-3 or on second treatment, BID, and PRN for scores above 3. No Indications - adjust the frequency to every 6 hours PRN wheezing or bronchospasm, if no treatments needed after 48 hours then discontinue using Per Protocol order mode. If indication present, adjust the RT bronchodilator orders based on the Bronchodilator Assessment Score as indicated below. Use Inhaler orders unless patient has one or more of the following: on home nebulizer, not able to hold breath for 10 seconds, is not alert and oriented, cannot activate and use MDI correctly, or respiratory rate 25 breaths per minute or more, then use the equivalent nebulizer order(s) with same Frequency and PRN reasons based on the score. If a patient is on this medication at home then do not decrease Frequency below that used at home.     0-3 - enter or revise RT bronchodilator order(s) to equivalent RT Bronchodilator order with Frequency of every 4 hours PRN for wheezing or increased work of breathing using Per Protocol order mode. 4-6 - enter or revise RT Bronchodilator order(s) to two equivalent RT bronchodilator orders with one order with BID Frequency and one order with Frequency of every 4 hours PRN wheezing or increased work of breathing using Per Protocol order mode. 7-10 - enter or revise RT Bronchodilator order(s) to two equivalent RT bronchodilator orders with one order with TID Frequency and one order with Frequency of every 4 hours PRN wheezing or increased work of breathing using Per Protocol order mode. 11-13 - enter or revise RT Bronchodilator order(s) to one equivalent RT bronchodilator order with QID Frequency and an Albuterol order with Frequency of every 4 hours PRN wheezing or increased work of breathing using Per Protocol order mode. Greater than 13 - enter or revise RT Bronchodilator order(s) to one equivalent RT bronchodilator order with every 4 hours Frequency and an Albuterol order with Frequency of every 2 hours PRN wheezing or increased work of breathing using Per Protocol order mode. RT to enter RT Home Evaluation for COPD & MDI Assessment order using Per Protocol order mode.     Electronically signed by Sandi Nettles RCP on 1/14/2022 at 3:32 PM

## 2022-01-14 NOTE — PROGRESS NOTES
Physical Therapy    Facility/Department: [facility] PROGRESSIVE CARE  Initial Assessment    NAME: Eve Batista  : 1960  MRN: [id number]    Date of Service: 2022    Discharge Recommendations:  Patient would benefit from continued therapy after discharge        Assessment   Body structures, Functions, Activity limitations: Decreased functional mobility ; Decreased strength;Decreased safe awareness;Decreased cognition;Decreased endurance;Decreased balance;Decreased posture  Assessment: Pt improved from 22 however significant increase in HR w/ standing, requires very close monitoring of HR when performing standing activities  Prognosis: Good  Decision Making: High Complexity  PT Education: PT Role;General Safety;Plan of Care  Patient Education: Requires reiteration of all education  Barriers to Learning: Cognitive status  Activity Tolerance  Activity Tolerance: Patient limited by endurance; Patient limited by cognitive status;Treatment limited secondary to medical complications (free text)       Patient Diagnosis(es): The primary encounter diagnosis was Altered mental status, unspecified altered mental status type. Diagnoses of Acute respiratory failure with hypercapnia ([address] and COVID-19 were also pertinent to this visit. has a past medical history of Anxiety, Arthritis, Atrial fib/flutter, transient, Bradycardia, COPD (chronic obstructive pulmonary disease) ([address] Dementia ([address] Depression, GERD (gastroesophageal reflux disease), Neuropathy, Parkinson's disease ([address] Pneumonia, and Syncope.   has a past surgical history that includes Breast surgery; Cholecystectomy; back surgery; Cardiac catheterization (); Tonsillectomy; Hand surgery (Right); Cardiac pacemaker placement (); ablation of dysrhythmic focus (2014); and transesophageal echocardiogram (2014).     Restrictions  Restrictions/Precautions  Restrictions/Precautions: General Precautions,Fall Risk  Required Braces or Orthoses?: No  Position Activity Restriction  Other position/activity restrictions: pt in wrist restraints, has 1:1 sitter  Vision/Hearing        Subjective  General  Chart Reviewed: Yes  Patient assessed for rehabilitation services?: Yes  Response To Previous Treatment: Patient with no complaints from previous session. Family / Caregiver Present: No  Diagnosis: Encephalopathy, COVID, AMS  Follows Commands: Impaired  Other (Comment): Responds appropriately but delayed  General Comment  Comments: OK for PT per Amrit TAYLOR  Pain Screening  Patient Currently in Pain: Yes  Pain Assessment  Pain Assessment: 0-10  Pain Level: 9  Pain Type: Chronic pain  Pain Location: Back  Pain Orientation: Right;Left;Lower; Upper  Vital Signs  Patient Currently in Pain: Yes       Orientation  Orientation  Orientation Level: Oriented to place;Oriented to time;Oriented to situation;Disoriented to place  Social/Functional History  Social/Functional History  Lives With: Spouse  Additional Comments: pt is too confused to provide history. Pt is  and per chart: Patient lives with wife and [facility] He also has shower seat, walker, cane and w/c in the home. Spouse wishes to take home as he has been in snf in past and did not have a good experience  Cognition   Cognition  Arousal/Alertness: Delayed responses to stimuli  Following Commands:  Follows one step commands with repetition    Objective     Observation/Palpation  Posture: Fair    AROM RLE (degrees)  RLE AROM: WFL  AROM LLE (degrees)  LLE AROM : WFL  Strength RLE  Strength RLE: WFL  Comment: Decreased tolerance  Strength LLE  Strength LLE: WFL  Strength RUE  Comment: See OT for B UE MMT  Tone RLE  RLE Tone: Normotonic  Tone LLE  LLE Tone: Normotonic  Motor Control  Gross Motor?: WFL  Sensation  Overall Sensation Status: WFL (No reported paresthesia)     Transfers  Sit to Stand: Maximum Assistance;2 Person Assistance  Stand to sit: Maximum Assistance;2 Person Assistance  Comment: Pts  prior to standing, up to 240 w/ standing,immeditely to sitting,  s/p 30 seconds sitting, notified RN  Ambulation  Ambulation?: No (Standing x 30 seconds only)     Balance  Posture: Fair  Sitting - Static: Good;-  Sitting - Dynamic: Fair;+  Standing - Static: Poor        Plan   Plan  Times per week: 1-2x/day,5-6 days/week  Current Treatment Recommendations: Strengthening,Balance Training,Transfer Training,Functional Mobility Training,Cognitive Reorientation,Gait Training,Endurance Training  Safety Devices  Type of devices: Left in bed,Bed alarm in place,Call light within reach,Gait belt  Restraints  Initially in place: No  Restraints: UE restraints    G-Code       OutComes Score                                                  AM-PAC Score  AM-PAC Inpatient Mobility Raw Score : 9 (01/14/22 [facility])  AM-PAC Inpatient T-Scale Score : 30.55 (01/14/22 1231)  Mobility Inpatient CMS 0-100% Score: 81.38 (01/14/22 [facility])  Mobility Inpatient CMS G-Code Modifier : CM (01/14/22 [facility])          Goals  Short term goals  Time Frame for Short term goals: 14 treatments  Short term goal 1: Independent bed mobility/ Transfers Min A x 1/ monitor HR  Short term goal 2: Initiate ambulation w/ RW/ Monitor HR  Short term goal 3: Good sitting balance/posture/ Fair + standing balance  Short term goal 4: Patient will be provided with cognitive stimulation/ Tolerate 30 min ther act  Patient Goals   Patient goals : No goals stated       Therapy Time   Individual Concurrent Group Co-treatment   Time In 0915 (Treatment time 22 minutes)         Time Out 0943         Minutes 29           Co-treatment with OT warranted secondary to decreased safety and independence requiring 2 skilled therapy professionals to address individual discipline's goals.          Mike López, PT

## 2022-01-14 NOTE — FLOWSHEET NOTE
Assessment completed as charted, bilat arm restraints removed prior to initiation of assessment patient, behavior appropriate at this time, 1:1 supervision continues

## 2022-01-15 NOTE — PROGRESS NOTES
Rectal tube was found outside of pt's body during brief change with a considerable amount of watery loose stool and was discarded. Pt is pooping watery unformed stools and states there is reduced abdominal pain now that the rectal tube has been removed, but still reports a lingering tenderness in the abdomen. Detail Level: Detailed What Was Performed First?: Curettage Size Of Lesion After Curettage: 1.5 Post-Care Instructions: I reviewed with the patient in detail post-care instructions. Patient is to keep the area dry for 48 hours, and not to engage in any swimming until the area is healed. Should the patient develop any fevers, chills, bleeding, severe pain patient will contact the office immediately. Bill As A Line Item Or As Units: Line Item Size Of Lesion In Cm: 0.7 Consent was obtained from the patient. The risks, benefits and alternatives to therapy were discussed in detail. Specifically, the risks of infection, scarring, bleeding, prolonged wound healing, nerve injury, incomplete removal, allergy to anesthesia and recurrence were addressed. Alternatives to ED&C, such as: surgical removal and XRT were also discussed.  Prior to the procedure, the treatment site was clearly identified and confirmed by the patient. All components of Universal Protocol/PAUSE Rule completed. Cautery Type: electrodesiccation Anesthesia Type: 1% lidocaine with epinephrine Additional Information: (Optional): The wound was cleaned, and a pressure dressing was applied.  The patient received detailed post-op instructions. Render Post-Care Instructions In Note?: yes Bill For Surgical Tray: no Number Of Curettages: 3

## 2022-01-15 NOTE — PROGRESS NOTES
[facility]  Office: [address], DO, Yvonne Dallas, DO, Dominga [facility], DO, Jodie Valladares Blood, DO, Veda Moore MD, Julio Alvarez MD, Duy Hartley MD, Scout Srinivasan MD, Dewey Pickens MD, Cholo Jimenez MD, Romina Ball MD, Jose Bobo, DO, Miles Cao, DO, Yoselin Brody MD,  Paul Reis, DO, Juliana Arellano MD, Lizz Carranza MD, Mary Garrison MD, Fantasma Stafford MD, Batsheva Valero MD, Erika Lopez MD, Jw Duvall MD, Rafaela Viera [facility], [facility], CNP, Sanjana Wheeler, CNP, Asif Solano, CNS, Garfield Quick, CNP, Melanie Payton, CNP, Bjorn Natarajan, CNP, Francisca Epley, CNP, Robb Myles, CNP, Perez Ordonez PA-C, Rene Pavon, DNP, Chin Cortes, DNP, Marybeth Wilburn, CNP, Galdino Jacome, CNP, Giovanny Vu, CNP, Lior Santana, CNP, Angela Gregory, [facility], Kolby Alvarez, [facility]    Progress Note    1/15/2022    7:45 AM    Name:   Carrie Loera  MRN:     [id number]     Acct:      [de-identified]   Room:   [address] [place] [address] Day:  7  Admit Date:  1/8/2022  3:33 PM    PCP:   Trey Kaplan MD  Code Status:  Full Code    Subjective:     C/C:   Chief Complaint   Patient presents with   [facility] Altered Mental Status     Interval History Status: worse     Patient seen and examined at bedside, no acute events overnight. Patient looks very tired today, not talking much with me   Patient vitals, labs and all providers notes were reviewed,from overnight shift and morning updates were noted and discussed with the nurse      Brief History: This is a 58-year-old male that presents with altered mental status. He has been found to have encephalopathy as well as COVID-19 infection. He is admitted for further evaluation and management. He has required oxygen but frequently pulls off.   He states he is not vaccinated as he does not believe he should have the vaccine for 4 to 5 years according to the news story that he had seen hydroxide, acetaminophen **OR** acetaminophen, guaiFENesin-dextromethorphan    Data:     Past Medical History:   has a past medical history of Anxiety, Arthritis, Atrial fib/flutter, transient, Bradycardia, COPD (chronic obstructive pulmonary disease) [address] Dementia [address] Depression, GERD (gastroesophageal reflux disease), Neuropathy, Parkinson's disease ([facility] Pneumonia, and Syncope. Social History:   reports that he has quit smoking. His smoking use included cigarettes. He has never used smokeless tobacco. He reports that he does not drink alcohol and does not use drugs. Family History:   Family History   Family history unknown: Yes       Vitals:  BP (!) 128/91   Pulse 92   Temp 99.1 °F (37.3 °C) (Axillary)   Resp 16   Ht 5' 11\" (1.803 m)   Wt 287 lb 7 oz (130.4 kg)   SpO2 93%   BMI 40.09 kg/m²   Temp (24hrs), Av.8 °F (37.1 °C), Min:97.6 °F (36.4 °C), Max:99.1 °F (37.3 °C)    No results for input(s): POCGLU in the last 72 hours. I/O (24Hr):     Intake/Output Summary (Last 24 hours) at 1/15/2022 0745  Last data filed at 2022 1230  Gross per 24 hour   Intake 150 ml   Output 600 ml   Net -450 ml       Labs:  Hematology:  Recent Labs     22  0857   WBC 4.9   RBC 5.76   HGB 15.1   HCT 46.7   MCV 81.1*   MCH 26.2   MCHC 32.3   RDW 14.3      MPV 9.3   CRP 9.3*     Chemistry:  Recent Labs     22  0857 22  0830    135   K 3.3* 3.3*   CL 98 99   CO2 25 23   GLUCOSE 152* 137*   BUN 24* 32*   CREATININE 0.93 0.93   ANIONGAP 14 13   LABGLOM >60 >60   GFRAA >60 >60   CALCIUM 9.0 9.3     Recent Labs     22  0857 22  0830   PROT 7.1 6.9   LABALBU 3.5 3.5   * 230*   * 501*   ALKPHOS 76 77   BILITOT 1.43* 1.54*   BILIDIR  --  0.92*     ABG:  Lab Results   Component Value Date    POCPH 7.414 2022    POCPCO2 40.9 2022    POCPO2 52.8 2022    POCHCO3 26.1 2022    NBEA NOT REPORTED 2022    PBEA 1 2022    WWU7BYP NOT REPORTED 01/09/2022    INAD8ZGD 87 01/09/2022    FIO2 NOT REPORTED 01/09/2022     Lab Results   Component Value Date/Time    SPECIAL NOT REPORTED 03/30/2012 06:02 PM     Lab Results   Component Value Date/Time    CULTURE NO GROWTH 03/30/2012 06:02 PM    CULTURE  03/30/2012 06:02 PM     Performed at [facility] [address] [facility], [facility] [phone]       Radiology:  XR ABDOMEN (KUB) (SINGLE AP VIEW)    Result Date: 1/11/2022  Nonobstructive bowel gas pattern. CT HEAD WO CONTRAST    Result Date: 1/8/2022  No acute intracranial abnormality. RECOMMENDATIONS: Unavailable     XR CHEST PORTABLE    Result Date: 1/12/2022  Mild interval increase in right lower lung field opacities, when compared to the previous study performed 1 day earlier. XR CHEST PORTABLE    Result Date: 1/11/2022  No significant interval change, when compared to the previous study performed 01/08/2022, as described above. XR CHEST PORTABLE    Result Date: 1/8/2022  Bilateral lower lung field reticulonodular prominence which is nonspecific and may represent bronchiolitis, fibrosis, COVID-19 pneumonia or other infectious/inflammatory process. US RETROPERITONEAL COMPLETE    Result Date: 1/10/2022  Nonvisualization of the right kidney. Limited exam as described. Otherwise unremarkable exam. RECOMMENDATIONS: Unavailable       Physical Examination:        Physical Exam  Vitals and nursing note reviewed. Constitutional:       General: He is not in acute distress. Appearance: He is ill-appearing. HENT:      Head: Normocephalic and atraumatic. Eyes:      Conjunctiva/sclera: Conjunctivae normal.      Pupils: Pupils are equal, round, and reactive to light. Cardiovascular:      Rate and Rhythm: Normal rate and regular rhythm. Heart sounds: No murmur heard. Pulmonary:      Effort: Pulmonary effort is normal. No accessory muscle usage or respiratory distress. Breath sounds: No stridor.  No decreased breath sounds, wheezing, rhonchi or rales. Abdominal:      General: Bowel sounds are normal. There is no distension. Palpations: Abdomen is soft. Abdomen is not rigid. Tenderness: There is no abdominal tenderness. There is no guarding. Musculoskeletal:         General: No tenderness. Skin:     General: Skin is warm and dry. Findings: No erythema, lesion or rash. Neurological:      Mental Status: He is alert and oriented to person, place, and time. Cranial Nerves: No cranial nerve deficit. Motor: No seizure activity. Psychiatric:         Behavior: Behavior is cooperative.          Assessment:        Hospital Problems           Last Modified POA    * (Principal) Pneumonia due to COVID-19 virus 1/9/2022 Yes    AMS (altered mental status) 1/9/2022 Yes    COPD without exacerbation ([address] 1/9/2022 Yes    A-fib ([address] 1/9/2022 Yes    Gastroesophageal reflux disease without esophagitis 1/9/2022 Yes    Neuropathy 1/9/2022 Yes    JO (acute kidney injury) ([address] 1/9/2022 Yes    Elevated troponin 1/9/2022 Yes    COVID-19 1/13/2022 Yes    Acute respiratory failure with hypercapnia ([address] 1/13/2022 Yes          Plan:        COVID 19 Pneumonia :   Steroids   Vit D  Incentive spirometry  Titrate inflammatory markers     Acute hypoxemic respiratory failure: Likely secondary to Covid 19 pneumonia, continue supplemental oxygen, repeat x-ray today  HFNC if needing    Dementia / PD at baseline: Continue chronic medications    Metabolic encephalopathy : secondary to above, significantly  Improving, will decrease Seroquel dose as he looks more sleepy this am    Successfully off restraint    COPD exacerbation: continue breathing treatment, continue steroids, mucolytics and home aerosols    JO : Resolved    Hypokalemia : replace and continue to monitor     AFIB / flutter : S/P pacer placement 2014     DVT and GI prophylaxis    Discussed with the patient and the nurse      Lis Arguelles MD  1/15/2022  7:45 AM

## 2022-01-15 NOTE — RT PROTOCOL NOTE
RT Inhaler-Nebulizer Bronchodilator Protocol Note    There is a bronchodilator order in the chart from a provider indicating to follow the RT Bronchodilator Protocol and there is an Initiate RT Inhaler-Nebulizer Bronchodilator Protocol order as well (see protocol at bottom of note). CXR Findings:  XR CHEST PORTABLE    Result Date: 1/14/2022  No significant interval change, when compared to the previous study performed 01/12/2022. The findings from the last RT Protocol Assessment were as follows:   History Pulmonary Disease: Chronic pulmonary disease  Respiratory Pattern: Regular pattern and RR 12-20 bpm  Breath Sounds: Slightly diminished and/or crackles  Cough: Strong, spontaneous, non-productive  Indication for Bronchodilator Therapy: Decreased or absent breath sounds  Bronchodilator Assessment Score: 4    Aerosolized bronchodilator medication orders have been revised according to the RT Inhaler-Nebulizer Bronchodilator Protocol below. Respiratory Therapist to perform RT Therapy Protocol Assessment initially then follow the protocol. Repeat RT Therapy Protocol Assessment PRN for score 0-3 or on second treatment, BID, and PRN for scores above 3. No Indications - adjust the frequency to every 6 hours PRN wheezing or bronchospasm, if no treatments needed after 48 hours then discontinue using Per Protocol order mode. If indication present, adjust the RT bronchodilator orders based on the Bronchodilator Assessment Score as indicated below. Use Inhaler orders unless patient has one or more of the following: on home nebulizer, not able to hold breath for 10 seconds, is not alert and oriented, cannot activate and use MDI correctly, or respiratory rate 25 breaths per minute or more, then use the equivalent nebulizer order(s) with same Frequency and PRN reasons based on the score. If a patient is on this medication at home then do not decrease Frequency below that used at home.     0-3 - enter or revise RT bronchodilator order(s) to equivalent RT Bronchodilator order with Frequency of every 4 hours PRN for wheezing or increased work of breathing using Per Protocol order mode. 4-6 - enter or revise RT Bronchodilator order(s) to two equivalent RT bronchodilator orders with one order with BID Frequency and one order with Frequency of every 4 hours PRN wheezing or increased work of breathing using Per Protocol order mode. 7-10 - enter or revise RT Bronchodilator order(s) to two equivalent RT bronchodilator orders with one order with TID Frequency and one order with Frequency of every 4 hours PRN wheezing or increased work of breathing using Per Protocol order mode. 11-13 - enter or revise RT Bronchodilator order(s) to one equivalent RT bronchodilator order with QID Frequency and an Albuterol order with Frequency of every 4 hours PRN wheezing or increased work of breathing using Per Protocol order mode. Greater than 13 - enter or revise RT Bronchodilator order(s) to one equivalent RT bronchodilator order with every 4 hours Frequency and an Albuterol order with Frequency of every 2 hours PRN wheezing or increased work of breathing using Per Protocol order mode. RT to enter RT Home Evaluation for COPD & MDI Assessment order using Per Protocol order mode.     Electronically signed by stephany jon RCP on 1/15/2022 at 9:42 AM

## 2022-01-15 NOTE — PROGRESS NOTES
Pulmonary Critical Care Progress Note       Patient seen for the follow up of acute hypoxic respiratory insufficiency, Pneumonia due to COVID-19 virus     Subjective:  He is   Still confused. He  Was in soft restraints. He has been  Getting on and off  agitated over . Uriel Rodas He is on 100% oxygen non-rebreather mask with saturation 92%. He was on 6 L by Venturi mask before according to record. He is not ambulating. Examination:  Vitals: /75   Pulse 99   Temp 98.6 °F (37 °C) (Axillary)   Resp 20   Ht 5' 11\" (1.803 m)   Wt 287 lb 7 oz (130.4 kg)   SpO2 (S) 95%   BMI 40.09 kg/m²   General appearance:   Awake slightly agitated confused  Neck: No JVD  Lungs:  Decreased breath sounds no crackles or wheezing  Heart: regular rate and rhythm, S1, S2 normal, no gallop  Abdomen: Soft, non tender, + BS  Extremities: no cyanosis or clubbing. No significant edema    LABs:  BMP:   Recent Labs     01/13/22  0857 01/13/22  0857 01/14/22  0830 01/15/22  0845      < > 135 137   K 3.3*   < > 3.3* 3.3*   CO2 25   < > 23 24   BUN 24*  --  32*  --    CREATININE 0.93  --  0.93  --    LABGLOM >60  --  >60  --    GLUCOSE 152*  --  137*  --     < > = values in this interval not displayed. ABG:  Lab Results   Component Value Date    RAU2SMN NOT REPORTED 01/09/2022    FIO2 NOT REPORTED 01/09/2022       Lab Results   Component Value Date    POCPH 7.414 01/09/2022    POCPCO2 40.9 01/09/2022    POCPO2 52.8 01/09/2022    POCHCO3 26.1 01/09/2022    NBEA NOT REPORTED 01/09/2022    PBEA 1 01/09/2022    ZJK5BNB NOT REPORTED 01/09/2022    ZLTZ0VDS 87 01/09/2022    FIO2 NOT REPORTED 01/09/2022   Results for Kimberly Meadows (MRN [id number]) as of 1/15/2022 18:38   Ref.  Range 1/9/2022 04:03 1/10/2022 05:23   D-Dimer, Zilphia Marika Latest Ref Range: 0.00 - 0.59 mg/L FEU 2.31 (H) 1.59 (H)     Radiology:  X-ray chest 1/14/2022   no significant interval change, when compared to the previous study performed   01/12/2022.     1/12      Impression:  · Acute hypoxic respiratory insufficiency  · COVID-19  Pneumonia  · Altered mental status/encephalopathy  · Acute kidney injury  · Suspected obstructive sleep apnea/Obesity  · A. fib, anxiety, arthritis, GERD  ·  elevated D-dimer    Recommendations:  · Oxygen via  Non-rebreather mass keep saturation above 90%  · BiPAP support 16/8  ·  transfer to ICU  ·  start Precedex drip  · Incentive spirometry every hour while wake  ·  DuoNeb by nebulizer 4 times daily  ·  Pulmicort 0.5 q.12 hours by nebulizer  ·  check chest x-ray/Lasix 20 mg IV x1 given  ·  Check D-dimer  ·  insert PICC line  · Prone as tolerated  ·  NPO  Except medsfor possible intubation  · Decadron increased to 10 mg daily  · Nephrology signed off.   · Monitor mental status /provide a sitter  · Seroquel 50 mg twice daily  ·  bedside physical therapy  ·  discussed with RN  ·  DVT prophylaxis on heparin 5000 q.8 hours    Lazarus Robin MD  Pulmonary Critical Care and Sleep Medicine,   [phone]

## 2022-01-15 NOTE — FLOWSHEET NOTE
Dr Seth Tafoya in to see patient, states that patient needs ICU placement and placed on Precedex, Dr Browning Males made aware via [facility]

## 2022-01-16 NOTE — PROGRESS NOTES
Patient transferred to room 1102 on the ICU unit @ 2100. All belongings brought with patient, telemetry applied, call light within reach. Patient oriented to room and voiced understanding to call out when needing assistance. Pt is Still on 1:1. Spoke with wife Neeta Cazares and updated her on her husbands status @21:12. All questions answered. Safety maintained during transfer.

## 2022-01-16 NOTE — PROGRESS NOTES
Called wife for consent for picc line, obtained consent with another RN via the phone with wife Robin Pollard who is at home with Covid herself. Wife very concerned that patient is not on home pain medications.   Sent a secure message to NP regarding wifes's concerns

## 2022-01-16 NOTE — PROGRESS NOTES
Pulmonary Critical Care Progress Note       Patient seen for the follow up of acute hypoxic respiratory insufficiency, Pneumonia due to COVID-19 virus     Subjective:  He was moved to ICU yesterday. He was placed on BiPAP at 90%. He was agitated and required Precedex drip. He has been placed on high flow is down to 60%. He is not ambulating. Examination:  Vitals: BP 95/63   Pulse 76   Temp 98.5 °F (36.9 °C) (Axillary)   Resp 29   Ht 5' 11\" (1.803 m)   Wt 287 lb 7 oz (130.4 kg)   SpO2 93%   BMI 40.09 kg/m²   General appearance:   Awake follows commands  Neck: No JVD  Lungs:  Decreased breath sounds no crackles or wheezing  Heart: regular rate and rhythm, S1, S2 normal, no gallop  Abdomen: Soft, non tender, + BS  Extremities: no cyanosis or clubbing. No significant edema    LABs:  BMP:   Recent Labs     01/14/22  0830 01/15/22  0845    137   K 3.3* 3.3*   CO2 23 24   BUN 32*  --    CREATININE 0.93  --    LABGLOM >60  --    GLUCOSE 137*  --    Results for Darshana Coburn (MRN [id number]) as of 1/16/2022 14:19   Ref.  Range 1/13/2022 08:57 1/14/2022 08:30 1/15/2022 08:45 1/16/2022 04:54   Albumin/Globulin Ratio Latest Ref Range: 1.0 - 2.5  NOT REPORTED NOT REPORTED  NOT REPORTED   Alk Phos Latest Ref Range: 40 - 129 U/L 76 77  71   ALT Latest Ref Range: 5 - 41 U/L 559 (H) 501 (H)  421 (H)   AST Latest Ref Range: <40 U/L 399 (H) 230 (H)  134 (H)   Bilirubin Latest Ref Range: 0.3 - 1.2 mg/dL 1.43 (H) 1.54 (H)  1.07   Bilirubin, Direct Latest Ref Range: <0.31 mg/dL  0.92 (H)  0.51 (H)   Bilirubin, Indirect Latest Ref Range: 0.00 - 1.00 mg/dL  0.62  0.56   Total Protein Latest Ref Range: 6.4 - 8.3 g/dL 7.1 6.9  6.5     ABG:  Lab Results   Component Value Date    OMI1ADW NOT REPORTED 01/09/2022    FIO2 NOT REPORTED 01/09/2022       Lab Results   Component Value Date    POCPH 7.414 01/09/2022    POCPCO2 40.9 01/09/2022    POCPO2 52.8 01/09/2022    POCHCO3 26.1 01/09/2022    NBEA NOT REPORTED 01/09/2022 PBEA 1 01/09/2022    OFU2ESJ NOT REPORTED 01/09/2022    UMKF1SIW 87 01/09/2022    FIO2 NOT REPORTED 01/09/2022   Results for Sunni Jefferson (MRN [id number]) as of 1/15/2022 18:38   Ref. Range 1/9/2022 04:03 1/10/2022 05:23   D-DimerAkshat Latest Ref Range: 0.00 - 0.59 mg/L FEU 2.31 (H) 1.59 (H)   Results for Sunni Jefferson (MRN [id number]) as of 1/16/2022 14:19   Ref. Range 1/9/2022 04:03 1/10/2022 05:23 1/15/2022 18:57   D-Dimer, Akshat Baer Latest Ref Range: 0.00 - 0.59 mg/L FEU 2.31 (H) 1.59 (H) 0.69 (H)     Radiology:  X-ray chest 1/15/2022  Bronchial wall thickening with reticulonodular opacities in the right lung   the left lung base, similar to prior.  This may reflect   infectious/inflammatory airways process it is not the typical appearance of   atypical viral pneumonitis.                 Impression:  · Acute hypoxic respiratory  failure requiring BiPAP and high flow  · COVID-19  Pneumonia  · Altered mental status/encephalopathy  · Acute kidney injury  · Suspected obstructive sleep apnea/Obesity  · A. fib, anxiety, arthritis, GERD  ·  elevated D-dimer  · Elevated liver function    Recommendations:  · Oxygen via high flow to keep saturation above 90%  · BiPAP support   · Precedex drip  · Incentive spirometry every hour while wake  ·  DuoNeb by nebulizer 4 times daily  ·  Pulmicort 0.5 q.12 hours by nebulizer  · Monitor D-dimer   · Prone as tolerated  · Okay to start clear liquids  · Decadron 10 mg daily  · Nephrology signed off.   · Monitor mental status /provide a sitter  · Seroquel 50 mg twice daily  ·  bedside physical therapy  ·  discussed with RN  ·  DVT prophylaxis on heparin 5000 q.8 hours    Christine Muller MD  Pulmonary Critical Care and Sleep Medicine,   [phone]  cc35  min

## 2022-01-16 NOTE — PROGRESS NOTES
Spoke with RT regarding high respiratory rate and possibly starting Bipap now that pt is on Precedex and has one on one sitter.

## 2022-01-16 NOTE — PROGRESS NOTES
[facility]  Office: [address], DO, Susanne Ward, DO, Dasia Sal, DO, Felix Gautam Blood, DO, Nadira Hess MD, Layton Petit MD, Giuliana Zimmerman MD, Ju Mcclure MD, Mateus Anderson MD, Rubina Davis MD, Clay Barrientos MD, Jerri Lunsford, DO, Estefani Finch, DO, Talia Bedoya MD,  Madisyn Alonso DO, Jody Novak MD, Jinny Santiago MD, Madonna George MD, Dimas Griffin MD, aRdha Gomez MD, Cristiana Pollack MD, Joanne Jane MD, Omega Hernandez [facility], [facility], [facility], Ariadne Cho, [facility], Jorje Coffey, CNS, Willa Siddiqui, CNP, Nolan Swan, CNP, Gage Vaughn, CNP, Julian De Jesus, CNP, Toshia Velasquez CNP, Purvi German PA-C, Artemio Primrose, [facility], Benjamin Riggs, [facility], Rani Babb, CNP, Brandi Beck, CNP, Veronica Pederson, CNP, Ralph Garcia, [facility], Lazarus Mais, CNP, [facility]    Progress Note    1/16/2022    12:36 PM    Name:   Troy Aguilar  MRN:     [id number]     [place]:      [de-identified]   Room:   [address] [place] [address] Day:  8  Admit Date:  1/8/2022  3:33 PM    PCP:   Sarah Mixon MD  Code Status:  Full Code    Subjective:     C/C:   Chief Complaint   Patient presents with    Altered Mental Status     Interval History Status: not changed. Remains confused  On precedex drip in icu on hfnc; tore off bipap earlier this am    Brief History:     Per my BRYAN:  Mei Erickson is a 59 y.o. Non- / non  male who presents with Altered Mental Status   and is admitted to the hospital for the management of COVID-19.     Since to the emergency room with altered mental status. Is confused and not able to provide any history for me. According to the ER nurse he was brought in by his wife after a fall at home. She states that he has been altered as well. No additional information available. He was found to have mild respiratory acidosis and started on BiPAP. Covid is positive.   He also had JO with BUN of 23 and creatinine of 3.09. Previous kidney function has been normal.\"    Review of Systems:     unobtainable      Medications: Allergies: Allergies   Allergen Reactions    Latex     Bee Venom     Prednisone Other (See Comments)     Mood swings       Current Meds:   Scheduled Meds:    QUEtiapine  25 mg Oral BID    dexamethasone  10 mg IntraVENous Q24H    ipratropium-albuterol  1 ampule Inhalation 4x daily    budesonide  0.5 mg Nebulization BID    gabapentin  100 mg Oral TID    pantoprazole  40 mg IntraVENous Daily    And    sodium chloride (PF)  10 mL IntraVENous Daily    ziprasidone  20 mg IntraMUSCular Once    And    sterile water  1.2 mL IntraMUSCular Once    heparin (porcine)  5,000 Units SubCUTAneous 3 times per day    busPIRone  15 mg Oral Daily    DULoxetine  60 mg Oral Daily    finasteride  5 mg Oral Daily    cetirizine  10 mg Oral Daily    rivastigmine  1 patch TransDERmal Daily    [Held by provider] spironolactone  25 mg Oral BID    sodium chloride flush  5-40 mL IntraVENous 2 times per day    Vitamin D  2,000 Units Oral Daily     Continuous Infusions:    dexmedetomidine (PRECEDEX) IV infusion 0.5 mcg/kg/hr (01/16/22 1000)    sodium chloride       PRN Meds: aluminum & magnesium hydroxide-simethicone, potassium chloride **OR** potassium alternative oral replacement **OR** potassium chloride, albuterol, metoprolol, LORazepam **OR** LORazepam, sodium chloride flush, sodium chloride, ondansetron **OR** ondansetron, magnesium hydroxide, acetaminophen **OR** acetaminophen, guaiFENesin-dextromethorphan    Data:     Past Medical History:   has a past medical history of Anxiety, Arthritis, Atrial fib/flutter, transient, Bradycardia, COPD (chronic obstructive pulmonary disease) ([facility] [address] Dementia ([facility] [address] Depression, GERD (gastroesophageal reflux disease), Neuropathy, Parkinson's disease ([facility] [address] Pneumonia, and Syncope. Social History:   reports that he has quit smoking.  His smoking use included cigarettes. He has never used smokeless tobacco. He reports that he does not drink alcohol and does not use drugs. Family History:   Family History   Family history unknown: Yes       Vitals:  /75   Pulse 82   Temp 98.5 °F (36.9 °C) (Axillary)   Resp 23   Ht 5' 11\" (1.803 m)   Wt 287 lb 7 oz (130.4 kg)   SpO2 93%   BMI 40.09 kg/m²   Temp (24hrs), Av.4 °F (36.9 °C), Min:97.8 °F (36.6 °C), Max:98.6 °F (37 °C)    No results for input(s): POCGLU in the last 72 hours. I/O (24Hr): Intake/Output Summary (Last 24 hours) at 2022 1236  Last data filed at 2022 0625  Gross per 24 hour   Intake 70.02 ml   Output --   Net 70.02 ml       Labs:  Hematology:  Recent Labs     01/15/22  1857   DDIMER 0.69*     Chemistry:  Recent Labs     22  0830 01/15/22  0845    137   K 3.3* 3.3*   CL 99 98   CO2 23 24   GLUCOSE 137*  --    BUN 32*  --    CREATININE 0.93  --    ANIONGAP 13 15   LABGLOM >60  --    GFRAA >60  --    CALCIUM 9.3  --      Recent Labs     22  0830 22  0454   PROT 6.9 6.5   LABALBU 3.5 3.5   * 134*   * 421*   ALKPHOS 77 71   BILITOT 1.54* 1.07   BILIDIR 0.92* 0.51*     ABG:  Lab Results   Component Value Date    POCPH 7.414 2022    POCPCO2 40.9 2022    POCPO2 52.8 2022    POCHCO3 26.1 2022    NBEA NOT REPORTED 2022    PBEA 1 2022    OMR2RHX NOT REPORTED 2022    VLXB8OWA 87 2022    FIO2 NOT REPORTED 2022     Lab Results   Component Value Date/Time    SPECIAL NOT REPORTED 2012 06:02 PM     Lab Results   Component Value Date/Time    CULTURE NO GROWTH 2012 06:02 PM    CULTURE  2012 06:02 PM     Performed at [facility] [id number] [place] [address] [phone]       Radiology:  XR ABDOMEN (KUB) (SINGLE AP VIEW)    Result Date: 2022  1. Unremarkable bowel gas pattern.      XR ABDOMEN (KUB) (SINGLE AP VIEW)    Result Date: 2022  Nonobstructive bowel gas pattern. US LIVER    Result Date: 1/14/2022  Significantly limited study due to patient difficulty with cooperation during the imaging examination. Possible hepatic steatosis. Gallbladder, pancreas and common duct not visualized. RECOMMENDATIONS: Unavailable     XR CHEST PORTABLE    Result Date: 1/15/2022  Bronchial wall thickening with reticulonodular opacities in the right lung the left lung base, similar to prior. This may reflect infectious/inflammatory airways process it is not the typical appearance of atypical viral pneumonitis. XR CHEST PORTABLE    Result Date: 1/14/2022  No significant interval change, when compared to the previous study performed 01/12/2022. XR CHEST PORTABLE    Result Date: 1/12/2022  Mild interval increase in right lower lung field opacities, when compared to the previous study performed 1 day earlier. XR CHEST PORTABLE    Result Date: 1/11/2022  No significant interval change, when compared to the previous study performed 01/08/2022, as described above. US RETROPERITONEAL COMPLETE    Result Date: 1/10/2022  Nonvisualization of the right kidney. Limited exam as described.   Otherwise unremarkable exam. RECOMMENDATIONS: Unavailable       Physical Examination:        General appearance:  Arousable, cooperative and no distress  Mental Status:  oriented to person and normal affect  Lungs:  clear to auscultation bilaterally, normal effort on hfnc  Heart:  regular rate and rhythm, no murmur  Abdomen:  soft, nontender, nondistended, normal bowel sounds, no masses, hepatomegaly, splenomegaly  Extremities:  no edema, redness, tenderness in the calves  Skin:  no gross lesions, rashes, induration    Assessment:        Hospital Problems           Last Modified POA    * (Principal) Pneumonia due to COVID-19 virus 1/9/2022 Yes    AMS (altered mental status) 1/9/2022 Yes    COPD without exacerbation ([address] 1/9/2022 Yes    A-fib ([address] 1/9/2022 Yes    Gastroesophageal reflux disease without esophagitis 1/9/2022 Yes    Neuropathy 1/9/2022 Yes    JO (acute kidney injury) ([facility] [address] 1/9/2022 Yes    Elevated troponin 1/9/2022 Yes    COVID-19 1/13/2022 Yes    Acute respiratory failure with hypercapnia ([facility] [address] 1/13/2022 Yes          Plan:        1. Wean hfnc/bipap as geeta; currently on hfnc  2. precedex drip, wean as able  3. On decadron per protocol  4. Monitor mental status  5.  D/w Dr Emily Hernandez Blood, DO  1/16/2022  12:36 PM

## 2022-01-16 NOTE — PLAN OF CARE
Problem: Pain:  Goal: Pain level will decrease  Description: Pain level will decrease  Outcome: Ongoing     Problem: Pain:  Goal: Control of acute pain  Description: Control of acute pain  Outcome: Ongoing     Problem: Pain:  Goal: Control of chronic pain  Description: Control of chronic pain  Outcome: Ongoing     Problem: Skin Integrity:  Goal: Will show no infection signs and symptoms  Description: Will show no infection signs and symptoms  Outcome: Ongoing     Problem: Skin Integrity:  Goal: Absence of new skin breakdown  Description: Absence of new skin breakdown  Outcome: Ongoing     Problem: Falls - Risk of:  Goal: Will remain free from falls  Description: Will remain free from falls  Outcome: Ongoing     Problem: Falls - Risk of:  Goal: Absence of physical injury  Description: Absence of physical injury  Outcome: Ongoing     Problem: Airway Clearance - Ineffective  Goal: Achieve or maintain patent airway  Outcome: Ongoing     Problem: Gas Exchange - Impaired  Goal: Absence of hypoxia  Outcome: Ongoing     Problem: Gas Exchange - Impaired  Goal: Promote optimal lung function  Outcome: Ongoing     Problem: Breathing Pattern - Ineffective  Goal: Ability to achieve and maintain a regular respiratory rate  Outcome: Ongoing     Problem: Body Temperature -  Risk of, Imbalanced  Goal: Ability to maintain a body temperature within defined limits  Outcome: Ongoing     Problem: Body Temperature -  Risk of, Imbalanced  Goal: Will regain or maintain usual level of consciousness  Outcome: Ongoing     Problem:  Body Temperature -  Risk of, Imbalanced  Goal: Complications related to the disease process, condition or treatment will be avoided or minimized  Outcome: Ongoing     Problem: Isolation Precautions - Risk of Spread of Infection  Goal: Prevent transmission of infection  Outcome: Ongoing     Problem: Nutrition Deficits  Goal: Optimize nutritional status  Outcome: Ongoing     Problem: Risk for Fluid Volume Deficit  Goal: Maintain normal heart rhythm  Outcome: Ongoing     Problem: Risk for Fluid Volume Deficit  Goal: Maintain absence of muscle cramping  Outcome: Ongoing     Problem: Risk for Fluid Volume Deficit  Goal: Maintain normal serum potassium, sodium, calcium, phosphorus, and pH  Outcome: Ongoing     Problem: Loneliness or Risk for Loneliness  Goal: Demonstrate positive use of time alone when socialization is not possible  Outcome: Ongoing     Problem: Fatigue  Goal: Verbalize increase energy and improved vitality  Outcome: Ongoing     Problem: Patient Education: Go to Patient Education Activity  Goal: Patient/Family Education  Outcome: Ongoing     Problem: Confusion - Acute:  Goal: Absence of continued neurological deterioration signs and symptoms  Description: Absence of continued neurological deterioration signs and symptoms  Outcome: Ongoing     Problem: Confusion - Acute:  Goal: Mental status will be restored to baseline  Description: Mental status will be restored to baseline  Outcome: Ongoing     Problem: Discharge Planning:  Goal: Ability to perform activities of daily living will improve  Description: Ability to perform activities of daily living will improve  Outcome: Ongoing     Problem: Discharge Planning:  Goal: Participates in care planning  Description: Participates in care planning  Outcome: Ongoing     Problem: Injury - Risk of, Physical Injury:  Goal: Will remain free from falls  Description: Will remain free from falls  Outcome: Ongoing     Problem: Injury - Risk of, Physical Injury:  Goal: Absence of physical injury  Description: Absence of physical injury  Outcome: Ongoing     Problem: Mood - Altered:  Goal: Mood stable  Description: Mood stable  Outcome: Ongoing     Problem: Mood - Altered:  Goal: Absence of abusive behavior  Description: Absence of abusive behavior  Outcome: Ongoing     Problem: Mood - Altered:  Goal: Verbalizations of feeling emotionally comfortable while being cared for

## 2022-01-16 NOTE — PLAN OF CARE
Problem: Pain:  Goal: Pain level will decrease  Description: Pain level will decrease  Outcome: Met This Shift  Goal: Control of acute pain  Description: Control of acute pain  Outcome: Met This Shift  Goal: Control of chronic pain  Description: Control of chronic pain  Outcome: Met This Shift     Problem: Skin Integrity:  Goal: Will show no infection signs and symptoms  Description: Will show no infection signs and symptoms  Outcome: Met This Shift  Goal: Absence of new skin breakdown  Description: Absence of new skin breakdown  Outcome: Met This Shift     Problem: Falls - Risk of:  Goal: Will remain free from falls  Description: Will remain free from falls  Outcome: Met This Shift  Goal: Absence of physical injury  Description: Absence of physical injury  Outcome: Met This Shift     Problem: Gas Exchange - Impaired  Goal: Absence of hypoxia  Outcome: Ongoing  Goal: Promote optimal lung function  Outcome: Ongoing     Problem: Nutrition Deficits  Goal: Optimize nutritional status  Outcome: Not Met This Shift     Problem: Confusion - Acute:  Goal: Absence of continued neurological deterioration signs and symptoms  Description: Absence of continued neurological deterioration signs and symptoms  Outcome: Met This Shift  Goal: Mental status will be restored to baseline  Description: Mental status will be restored to baseline  Outcome: Met This Shift     Problem: Injury - Risk of, Physical Injury:  Goal: Will remain free from falls  Description: Will remain free from falls  Outcome: Met This Shift  Goal: Absence of physical injury  Description: Absence of physical injury  Outcome: Met This Shift     Problem: Mood - Altered:  Goal: Mood stable  Description: Mood stable  Outcome: Met This Shift  Goal: Absence of abusive behavior  Description: Absence of abusive behavior  Outcome: Met This Shift     Problem: Psychomotor Activity - Altered:  Goal: Absence of psychomotor disturbance signs and symptoms  Description: Absence of psychomotor disturbance signs and symptoms  Outcome: Met This Shift     Problem: Sensory Perception - Impaired:  Goal: Demonstrations of improved sensory functioning will increase  Description: Demonstrations of improved sensory functioning will increase  Outcome: Met This Shift  Goal: Decrease in sensory misperception frequency  Description: Decrease in sensory misperception frequency  Outcome: Met This Shift  Goal: Able to refrain from responding to false sensory perceptions  Description: Able to refrain from responding to false sensory perceptions  Outcome: Met This Shift

## 2022-01-16 NOTE — FLOWSHEET NOTE
Patient in droplet isolation. No family present. Patient has high flow oxygen on. Patient appears to be sleeping and seems to be resting comfortably. Writer prays for patient from the hallway. Patient does not respond. Spiritual Care will follow as needed.        01/16/22 1215   Encounter Summary   Services provided to: Patient not available   Referral/Consult From: [facility]   (1/16/22 COVID-19)   Complexity of Encounter Low   Length of Encounter 15 minutes   Routine   Type Follow up   Assessment Unable to respond   Intervention Prayer   Outcome Did not respond

## 2022-01-16 NOTE — FLOWSHEET NOTE
Call received from pt's spouse who is voicing concern that pt is not receiving home pain meds stating pt has chronic pains that is only relieved with meds. Spouse informed that pt has not complained of pain today informed that nurse has been in room frequently today and pt has never complained of pain also updated spouse that pt has remained calm and co operative resp status discussed and spouse reassured that if pt did c/o pain the physician would be notified for pain medication.  Spouse thankful for information and satisfied with discussion

## 2022-01-16 NOTE — PROGRESS NOTES
RN reached out to Dr. Gabriella Kessler regarding his ICU transfer and precedex order and that no ICU beds are available for this patient at this time. RN, Rigo Bonner, spoke with Dr. Parish Petersen regarding pulmonary input. Dr. Parish Petersen wants to keep patient on PCU and continue to monitor on heated high flow. Writer notified Dr. Gabriella Kessler via secure message that patient would remain on unit.

## 2022-01-16 NOTE — FLOWSHEET NOTE
Pt on bipap with 90% FI02 unable to administer ammedications will discuss with physician upon rounds

## 2022-01-16 NOTE — PROGRESS NOTES
Called Access RN for picc line placement, might not be able to get here tonight. Will check with available nurse and return call with ETA.

## 2022-01-17 NOTE — PLAN OF CARE
Nutrition Problem #1: Inadequate protein-energy intake  Intervention: Food and/or Nutrient Delivery: Continue Current Diet,Start Oral Nutrition Supplement  Nutritional Goals: PO intakes are greater than 75% at meals

## 2022-01-17 NOTE — PROGRESS NOTES
Occupational Therapy  Facility/Department: [facility] ICU  Daily Treatment Note  NAME: Gagan Rider  :   MRN: [id number]    Date of Service: 2022   RN reports patient is medically stable for therapy treatment this date. Chart reviewed prior to treatment and patient is agreeable for therapy. All lines intact and patient positioned comfortably at end of treatment. All patient needs addressed prior to ending therapy session. Discharge Recommendations:  Patient would benefit from continued therapy after discharge   Pt currently functioning below baseline. Would suggest additional therapy at time of discharge to maximize long term outcomes and prevent re-admission. Please refer to AM-PAC score for current level of function. Assessment   Performance deficits / Impairments: Decreased functional mobility ; Decreased ADL status; Decreased strength;Decreased safe awareness;Decreased cognition;Decreased endurance;Decreased sensation;Decreased balance;Decreased posture  Assessment: Pt with deficits of strength, bed mobility, transfers,  balance, safety awareness and endurance this session,  & required 2 assist for safety & transfers. With current deficits, Pt HIGH risk for falls & requires continued OT to maximize independence with functional mobility, balance, safety awareness & activity tolerance. Prognosis: Fair  OT Education: OT Role;Plan of Care;Precautions; ADL Adaptive Strategies;Transfer Training;Energy Conservation;Home Exercise Program  Patient Education: participation in therapy, pursed lip breathing, cognitive reorientation, benefits of sitting up for pressure relief and overall strengthening, performing supine ROM on own  Barriers to Learning: cognition, tending to be agitated/agressive  REQUIRES OT FOLLOW UP: Yes  Activity Tolerance  Activity Tolerance: Treatment limited secondary to decreased cognition;Patient limited by fatigue  Safety Devices  Safety Devices in place: Yes  Type of devices: Call light within reach;Nurse notified; Left in bed;Patient at risk for falls; Bed alarm in place         Patient Diagnosis(es): The primary encounter diagnosis was Altered mental status, unspecified altered mental status type. Diagnoses of Acute respiratory failure with hypercapnia ([address] and COVID-19 were also pertinent to this visit. has a past medical history of Anxiety, Arthritis, Atrial fib/flutter, transient, Bradycardia, COPD (chronic obstructive pulmonary disease) ([address] Dementia ([facility] [address] Depression, GERD (gastroesophageal reflux disease), Neuropathy, Parkinson's disease ([facility] [address] Pneumonia, and Syncope.   has a past surgical history that includes Breast surgery; Cholecystectomy; back surgery; Cardiac catheterization (2010); Tonsillectomy; Hand surgery (Right); Cardiac pacemaker placement (); ablation of dysrhythmic focus (11-); and transesophageal echocardiogram (11-). Restrictions  Restrictions/Precautions  Restrictions/Precautions: General Precautions,Fall Risk  Required Braces or Orthoses?: No  Position Activity Restriction  Other position/activity restrictions: HF, has 1:1 sitter  Subjective   General  Chart Reviewed: Yes  Patient assessed for rehabilitation services?: Yes  Family / Caregiver Present:  (1:1 sitter present)  General Comment  Comments: Pt supine in bed agreeable to co-treat with PT. Pt had been asking about doing therapy all today per RN. Pt becomes aggravated very easily and does not always listen to edu. Orientation  Orientation  Overall Orientation Status: Impaired  Orientation Level: Oriented to person;Disoriented to situation;Disoriented to time;Oriented to place  Objective             Balance  Sitting Balance: Contact guard assistance (CGA-SBA. Pt sat EOB for approx 15 min until requesting to lay back down. Pt SpO2 fluctuated from high 80 to low 80s.  After decreasing to low 80s would increase back up to high 80s in under 10 seconds)  Bed mobility  Supine to Sit: Moderate assistance;2 Person assistance  Sit to Supine: Moderate assistance;2 Person assistance  Scooting: Minimal assistance (scooting to R for higher placement up in bed)  Comment: Pt requires increased time/effort to complete. Pt able to initiate bed mobility but requires assistance from x2 staff for progression of BLEs in/out of bed and bringing trunk upward d/t decreased strength. Max vc's for sequencing, proper tech, pursed lip breathing, pacing, upright sitting, and awareness/assist with multiple lines for increased safety. Transfers  Transfer Comments: N/T not apporpriate at this time d/t pt's decrease in SpO2                       Cognition  Overall Cognitive Status: Exceptions  Arousal/Alertness: Delayed responses to stimuli  Following Commands:  Follows one step commands with repetition  Attention Span: Difficulty attending to directions  Memory: Decreased recall of biographical Information;Decreased recall of recent events  Safety Judgement: Decreased awareness of need for assistance;Decreased awareness of need for safety  Problem Solving: Assistance required to generate solutions;Assistance required to implement solutions;Decreased awareness of errors;Assistance required to correct errors made;Assistance required to identify errors made  Insights: Not aware of deficits  Initiation: Requires cues for all  Sequencing: Requires cues for all                                         Plan   Plan  Times per week: 3-4x/week  Current Treatment Recommendations: Strengthening,Balance Training,Functional Mobility Training,Endurance Training,Safety Education & Training,Self-Care / ADL,Patient/Caregiver Education & Training,Equipment Evaluation, Education, & procurement,Positioning,Cognitive Reorientation,Cognitive/Perceptual Training                                               AM-PAC Score        AM-[facility] Inpatient Daily Activity Raw Score: 8 (01/17/22 8802)  AM-PAC Inpatient ADL T-Scale Score : 22.86 (01/17/22 1532)  ADL Inpatient CMS 0-100% Score: 85.69 (01/17/22 1532)  ADL Inpatient CMS G-Code Modifier : CM (01/17/22 1532)    Goals  Short term goals  Time Frame for Short term goals: by discharge, pt will  Short term goal 1: tolerate reassessment of ADL transfers/mob as tolerated  Short term goal 2: demo min A with simple grooming tasks and self feeding following set up and min cues for initiation/sequencing  Short term goal 3: participate in AROM of UEs for inc strength for mob/ADLs  Short term goal 4: demo mod Ax 1 with bed mob with rails/controls  Patient Goals   Patient goals : not stated       Therapy Time   Co-Treat Concurrent Group Co-treatment   Time In 1421         Time Out 1455         Minutes 29               Co-treatment with PT warranted secondary to decreased safety and independence requiring 2 skilled therapy professionals to address individual discipline's goals. OT addressing preparation for ADL transfer, sitting balance for increased ADL performance, sitting/activity tolerance, functional reaching, environmental safety/scanning, fall prevention, functional mobility for ADL transfers, ability to sequence and follow directions and functional UE strength. Upon writer exit, call light within reach, pt retired to bed. All lines intact and patient positioned comfortably. All patient needs addressed prior to ending therapy session. Chart reviewed prior to treatment and patient is agreeable for therapy. RN reports patient is medically stable for therapy treatment this date.     FERMIN Michelle

## 2022-01-17 NOTE — CONSULTS
Section of Cardiology   Consult Note      Reason for Consult: Ventricular arrhythmia  Requesting Physician: Parag Schafer DO    CHIEF COMPLAINT: Shortness of breath    History Obtained From:  electronic medical record, patient's nurse    HISTORY OF PRESENT ILLNESS:      The patient is a 59 y.o. male with significant past medical history of paroxysmal atrial fibrillation, permanent pacemaker who presents with shortness of breath and was found to have COVID-19 pneumonia and placed in isolation. Incidentally this morning the patient had short run of wide-complex tachycardia. The patient remained asymptomatic per nursing. The patient is on oxygen supply. No report of chest pain. The patient is agitated. Previous diagnostic testing for coronary artery disease includes: echocardiogram, EPS study, persantine thallium. Previous history of cardiac disease includes: atrial fibrillation. Coronary artery disease risk factors include: advanced age (older than 54 for men, 72 for women), male gender and smoking/ tobacco exposure. Past Medical History:    Past Medical History:   Diagnosis Date    Anxiety     Arthritis     Atrial fib/flutter, transient     Bradycardia     [facility]) [facility] [address]     Dementia ([facility] [address] 2017    Depression     GERD (gastroesophageal reflux disease)     Neuropathy     Parkinson's disease ([facility] [address]     Pneumonia     Syncope      Past Surgical History:    Past Surgical History:   Procedure Laterality Date    ABLATION OF DYSRHYTHMIC FOCUS  11-    BACK SURGERY      BREAST SURGERY      CARDIAC CATHETERIZATION  2010    CARDIAC PACEMAKER PLACEMENT      CHOLECYSTECTOMY      HAND SURGERY Right     thumb    TONSILLECTOMY      TRANSESOPHAGEAL ECHOCARDIOGRAM  11-     Home Medications:  Prior to Admission medications    Medication Sig Start Date End Date Taking?  Authorizing Provider   spironolactone (ALDACTONE) 25 MG tablet Take 25 mg by mouth 2 times daily   Yes Historical Provider, MD   famotidine (PEPCID) 40 MG tablet Take 40 mg by mouth daily   Yes Historical Provider, MD   Cholecalciferol (VITAMIN D3) 50 MCG (2000 UT) CAPS Take 50 Units by mouth once a week    Yes Historical Provider, MD   azithromycin (ZITHROMAX) 500 MG tablet Take 500 mg by mouth daily    Yes Historical Provider, MD   rivastigmine (EXELON) 4.6 MG/24HR Place 1 patch onto the skin daily   Yes Historical Provider, MD   budesonide (PULMICORT) 0.5 MG/2ML nebulizer suspension Take 1 ampule by nebulization 2 times daily   Yes Historical Provider, MD   ipratropium (ATROVENT) 0.02 % nebulizer solution Take 0.5 mg by nebulization 4 times daily   Yes Historical Provider, MD   albuterol (PROVENTIL) (2.5 MG/3ML) 0.083% nebulizer solution Take 2.5 mg by nebulization every 4 hours as needed for Wheezing    Yes Historical Provider, MD   HYDROcodone-acetaminophen (NORCO)  MG per tablet Take 1 tablet by mouth every 4 hours as needed. Yes Historical Provider, MD   finasteride (PROSCAR) 5 MG tablet Take 5 mg by mouth daily. Yes Historical Provider, MD   loratadine (CLARITIN) 10 MG tablet Take 1 capsule by mouth daily    Yes Historical Provider, MD   gabapentin (NEURONTIN) 600 MG tablet Take 1,200 mg by mouth 3 times daily. Takes 2 tabs (=1200mg) TID   Yes Historical Provider, MD   busPIRone (BUSPAR) 15 MG tablet Take 15 mg by mouth daily. Yes Historical Provider, MD   DULoxetine (CYMBALTA) 60 MG capsule Take 60 mg by mouth daily. Yes Historical Provider, MD   QUETIAPINE FUMARATE PO Take 75 mg by mouth nightly Takes 25mg + 50mg tabs for 75mg nightly dose   Yes Historical Provider, MD   diazepam (VALIUM) 10 MG tablet Take 10 mg by mouth 4 times daily as needed for Anxiety. Yes Historical Provider, MD   OxyCODONE HCl ER (OXYCONTIN) 60 MG T12A Take 60 mg by mouth 3 times daily.     Yes Historical Provider, MD   midodrine (PROAMATINE) 2.5 MG tablet Take 2.5 mg by mouth 3 times daily as needed.  Takes one or 2 tabs 3x day    Historical Provider, MD     Current Medications:    Current Facility-Administered Medications   Medication Dose Route Frequency Provider Last Rate Last Admin    morphine (PF) injection 2 mg  2 mg IntraVENous Q2H PRN PATRICK Pinto CNP        Or    morphine sulfate (PF) injection 4 mg  4 mg IntraVENous Q2H PRN PATRICK Pinto - CNP   4 mg at 01/17/22 0720    aluminum & magnesium hydroxide-simethicone (MAALOX) 200-200-20 MG/5ML suspension 30 mL  30 mL Oral Q6H PRN PATRICK Pinto - CNP   30 mL at 01/15/22 0417    potassium chloride (KLOR-CON M) extended release tablet 40 mEq  40 mEq Oral PRN Tammie Rayo MD   40 mEq at 01/15/22 1004    Or    potassium chloride 20 MEQ/15ML (10%) oral solution 40 mEq  40 mEq Oral PRN Tammie Rayo MD        Or    potassium chloride 10 mEq/100 mL IVPB (Peripheral Line)  10 mEq IntraVENous PRN Tammie Rayo MD        QUEtiapine (SEROQUEL) tablet 25 mg  25 mg Oral BID Tammie Rayo MD   25 mg at 01/17/22 0931    dexmedetomidine (PRECEDEX) 400 mcg in sodium chloride 0.9 % 100 mL infusion  0.2-1.4 mcg/kg/hr IntraVENous Continuous Pamella Keating MD 16.3 mL/hr at 01/17/22 1119 0.5 mcg/kg/hr at 01/17/22 1119    dexamethasone (PF) (DECADRON) injection 10 mg  10 mg IntraVENous Q24H Pamella Keating MD   10 mg at 01/16/22 1932    ipratropium-albuterol (DUONEB) nebulizer solution 1 ampule  1 ampule Inhalation 4x daily Pamella Keating MD   1 ampule at 01/17/22 1015    budesonide (PULMICORT) nebulizer suspension 500 mcg  0.5 mg Nebulization BID Pamella Keating MD   500 mcg at 01/17/22 0625    albuterol (PROVENTIL) nebulizer solution 2.5 mg  2.5 mg Nebulization Q4H PRN Karyn Carmichael MD        gabapentin (NEURONTIN) capsule 100 mg  100 mg Oral TID Tammie Rayo MD   100 mg at 01/17/22 0930    metoprolol (LOPRESSOR) injection 5 mg  5 mg IntraVENous Q6H PRN PATRICK Morse NP   5 mg at 01/13/22 9557  pantoprazole (PROTONIX) injection 40 mg  40 mg IntraVENous Daily Ginette Ascencio, APRN - NP   40 mg at 01/17/22 0931    And    sodium chloride (PF) 0.9 % injection 10 mL  10 mL IntraVENous Daily Ginette Ascencio, APRN - NP   10 mL at 01/17/22 0932    LORazepam (ATIVAN) injection 1 mg  1 mg IntraVENous Q6H PRN Chela Milton Orlop, DO   1 mg at 01/13/22 2340    Or    LORazepam (ATIVAN) injection 2 mg  2 mg IntraVENous Q6H PRN Chela Milton Orlop, DO   2 mg at 01/16/22 1926    ziprasidone (GEODON) injection 20 mg  20 mg IntraMUSCular Once [employer], DO        And    sterile water injection 1.2 mL  1.2 mL IntraMUSCular Once [employer], DO        heparin (porcine) injection 5,000 Units  5,000 Units SubCUTAneous 3 times per day [employer], DO   5,000 Units at 01/17/22 0530    busPIRone (BUSPAR) tablet 15 mg  15 mg Oral Daily Jinny Crater, APRN - CNP   15 mg at 01/17/22 0931    DULoxetine (CYMBALTA) extended release capsule 60 mg  60 mg Oral Daily Jinny Crater, APRN - CNP   60 mg at 01/17/22 0930    finasteride (PROSCAR) tablet 5 mg  5 mg Oral Daily Jinny Crater, APRN - CNP   5 mg at 01/17/22 0930    cetirizine (ZYRTEC) tablet 10 mg  10 mg Oral Daily Jinny Crater, APRN - CNP   10 mg at 01/17/22 0931    rivastigmine (EXELON) 4.6 MG/24HR 1 patch  1 patch TransDERmal Daily Jinny Crater, APRN - CNP   1 patch at 01/17/22 0122    [Held by provider] spironolactone (ALDACTONE) tablet 25 mg  25 mg Oral BID Jinny Crater, APRN - CNP        sodium chloride flush 0.9 % injection 5-40 mL  5-40 mL IntraVENous 2 times per day Jinny Crater, APRN - CNP   10 mL at 01/17/22 0931    sodium chloride flush 0.9 % injection 10 mL  10 mL IntraVENous PRN Jinny Crater, APRN - CNP        0.9 % sodium chloride infusion  25 mL IntraVENous PRN Jinny Crater, APRN - CNP        ondansetron (ZOFRAN-ODT) disintegrating tablet 4 mg  4 mg Oral Q8H PRN Wyonia Samples, APRN - CNP        Or    ondansetron [facility] injection 4 mg  4 mg IntraVENous Q6H PRN Wyonia Samples, APRN - CNP   4 mg at 01/11/22 1446    magnesium hydroxide (MILK OF MAGNESIA) 400 MG/5ML suspension 30 mL  30 mL Oral Daily PRN Wyonia Samples, APRN - CNP        acetaminophen (TYLENOL) tablet 650 mg  650 mg Oral Q6H PRN Wyonia Samples, APRN - CNP   650 mg at 01/16/22 1926    Or    acetaminophen (TYLENOL) suppository 650 mg  650 mg Rectal Q6H PRN Wyonia Samples, APRN - CNP        guaiFENesin-dextromethorphan (ROBITUSSIN DM) 100-10 MG/5ML syrup 5 mL  5 mL Oral Q4H PRN Wyonia Samples, APRN - CNP   5 mL at 01/14/22 2139    Vitamin D (CHOLECALCIFEROL) tablet 2,000 Units  2,000 Units Oral Daily Wyonia Samples, APRN - CNP   2,000 Units at 01/17/22 0930     Allergies:  Latex, Bee venom, and Prednisone    Social History:    Social History     Socioeconomic History    Marital status:      Spouse name: Not on file    Number of children: Not on file    Years of education: Not on file    Highest education level: Not on file   Occupational History    Not on file   Tobacco Use    Smoking status: Former Smoker     Types: Cigarettes    Smokeless tobacco: Never Used    Tobacco comment: quit 10 years ago   Substance and Sexual Activity    Alcohol use: No    Drug use: Never    Sexual activity: Not on file   Other Topics Concern    Not on file   Social History Narrative    Not on file     Social Determinants of Health     Financial Resource Strain:     Difficulty of Paying Living Expenses: Not on file   Food Insecurity:     Worried About [address] in the Last Year: Not on file    [address] in the Last Year: Not on file   Transportation Needs:     Lack of Transportation (Medical): Not on file    Lack of Transportation (Non-Medical):  Not on file   Physical Activity:     Days of Exercise per Week: Not on file    Minutes of Exercise per Session: Not on file   Stress:     Feeling of Stress : Not on file   Social Connections:     Frequency of Communication with Friends and Family: Not on file    Frequency of Social Gatherings with Friends and Family: Not on file    Attends [employer] Services: Not on file    Active Member of Clubs or Organizations: Not on file    Attends Club or Organization Meetings: Not on file    Marital Status: Not on file   Intimate Partner Violence:     Fear of Current or Ex-Partner: Not on file    Emotionally Abused: Not on file    Physically Abused: Not on file    Sexually Abused: Not on file   Housing Stability:     Unable to Pay for Housing in the Last Year: Not on file    Number of [place] in the Last Year: Not on file    Unstable Housing in the Last Year: Not on file     Family History:   Family History   Family history unknown: Yes       · REVIEW OF SYSTEMS   Not feasible due to patient's variation    PHYSICAL EXAM:    Vitals:    VITALS:  BP 99/68   Pulse 69   Temp 98.5 °F (36.9 °C) (Oral)   Resp 25   Ht 5' 11\" (1.803 m)   Wt 287 lb 7 oz (130.4 kg)   SpO2 92%   BMI 40.09 kg/m²   24HR INTAKE/OUTPUT:      Intake/Output Summary (Last 24 hours) at 1/17/2022 1145  Last data filed at 1/17/2022 0615  Gross per 24 hour   Intake 610.87 ml   Output 400 ml   Net 210.87 ml     Physical exam was deferred due to COVID-19 isolation status and to minimize the spread of the infection however through the glass door the patient appears to be comfortable and sleepy. DATA:   ECG: Admission EKG showed atrial paced rhythm  ECHO: Date: 2014  Reported to have normal LV systolic function  Stress Test:  Not performed to date  Angiography:  Not performed to date    Cardiology Labs:No results for input(s): MYOGLOBIN, CKTOTAL, CKMB, CKMBINDEX, TROPONINT, BNP in the last 72 hours.   Warfarin PT/INR:  Lab Results   Component Value Date    PROTIME 16.1 01/09/2022    INR 1.3 01/09/2022     CBC:  Lab Results Component Value Date    WBC 4.9 01/13/2022    RBC 5.76 01/13/2022    RBC 4.23 03/08/2012    HGB 15.1 01/13/2022    HCT 46.7 01/13/2022    MCV 81.1 01/13/2022    MCH 26.2 01/13/2022    MCHC 32.3 01/13/2022    RDW 14.3 01/13/2022     01/13/2022     03/08/2012    MPV 9.3 01/13/2022     CMP:  Lab Results   Component Value Date     01/15/2022    K 3.3 01/15/2022    CL 98 01/15/2022    CO2 24 01/15/2022    BUN 32 01/14/2022    CREATININE 0.93 01/14/2022    GFRAA >60 01/14/2022    LABGLOM >60 01/14/2022    GLUCOSE 137 01/14/2022    GLUCOSE 88 03/08/2012    CALCIUM 9.3 01/14/2022     Magnesium:    Lab Results   Component Value Date    MG 1.9 01/12/2022     PTT:    Lab Results   Component Value Date    APTT 38.1 01/09/2022     TSH:    Lab Results   Component Value Date    TSH 1.31 09/26/2020     BMP:  Lab Results   Component Value Date     01/15/2022    K 3.3 01/15/2022    CL 98 01/15/2022    CO2 24 01/15/2022    BUN 32 01/14/2022    LABALBU 3.5 01/16/2022    LABALBU 4.0 03/08/2012    CREATININE 0.93 01/14/2022    CALCIUM 9.3 01/14/2022    GFRAA >60 01/14/2022    LABGLOM >60 01/14/2022    GLUCOSE 137 01/14/2022    GLUCOSE 88 03/08/2012     LIVER PROFILE:  Recent Labs     01/16/22  0454   *   *   LABALBU 3.5   ALKPHOS 71   BILITOT 1.07   BILIDIR 0.51*   IBILI 0.56   PROT 6.5   GLOB NOT REPORTED   ALBUMIN NOT REPORTED     FLP:    Lab Results   Component Value Date    CHOL 136 09/26/2020    TRIG 186 09/26/2020    HDL 27 09/26/2020    LDLCHOLESTEROL 72 09/26/2020       IMPRESSION  1. Short nonsustained ventricular tachycardia  2. History of atrial fibrillation, status post ablation in 2014  3. Permanent pacemaker  4. Acute respiratory failure  5. COVID-19 pneumonia  6. Acute kidney injury, resolved  7. Elevated troponin, could be related to acute kidney injury and hypoxemia  8. Altered mental status  9. Elevated D-dimer  10.   Elevated cardiac enzymes    Patient Active Problem List   Diagnosis    AMS (altered mental status)    COPD without exacerbation [facility] [address]    A-fib ([facility] [address] [facility]    Gastroesophageal reflux disease without esophagitis    Neuropathy    JO (acute kidney injury) ([facility] [address]    Elevated troponin    Pneumonia due to COVID-19 virus    COVID-19    Acute respiratory failure with hypercapnia (HCC)           RECOMMENDATIONS:     Continue current medications  Management plan was discussed with patient     Patient needs to be on ProAmatine for low blood pressure but he receives Aldactone. He is not on chronic anticoagulation  For the most part his heart rate and blood pressure are stable and within normal range. Magnesium level was normal few days ago. Will observe his rhythm and intervene if needed. Keep potassium level above 4 and magnesium level above 2. Discussed with the patient's nurse  Thank you for consultation.       Electronically signed by Shon Navarro MD on 1/17/2022 at 11:45 AM     CC: Dominique Farris MD

## 2022-01-17 NOTE — CARE COORDINATION
Patient is currently on HF- 90%/50L. He is refusing to wear BiPAP. Keeps pulling apartment BiPAP mask. Writer spoke with spouse. [place] [facility] Ref[facility] sent. Continue to follow.

## 2022-01-17 NOTE — PROGRESS NOTES
Pulmonary Critical Care Progress Note       Patient seen for the follow up of acute hypoxic respiratory insufficiency, Pneumonia due to COVID-19 virus     Subjective:  He has been on high flow at 90%. He declined BiPAP in spite of Precedex. He still requires Precedex drip. He has been tolerating oral intake   he is not ambulating. Examination:  Vitals: /66   Pulse 88   Temp 98.5 °F (36.9 °C) (Oral)   Resp (!) 38   Ht 5' 11\" (1.803 m)   Wt 287 lb 7 oz (130.4 kg)   SpO2 92%   BMI 40.09 kg/m²   General appearance:   Awake follows commands  Neck: No JVD  Lungs:  Decreased breath sounds no crackles or wheezing  Heart: regular rate and rhythm, S1, S2 normal, no gallop  Abdomen: Soft, non tender, + BS  Extremities: no cyanosis or clubbing. No significant edema    LABs:  BMP:   Recent Labs     01/15/22  0845      K 3.3*   CO2 24   Results for Racheal Rivers (MRN [id number]) as of 1/16/2022 14:19   Ref.  Range 1/13/2022 08:57 1/14/2022 08:30 1/15/2022 08:45 1/16/2022 04:54   Albumin/Globulin Ratio Latest Ref Range: 1.0 - 2.5  NOT REPORTED NOT REPORTED  NOT REPORTED   Alk Phos Latest Ref Range: 40 - 129 U/L 76 77  71   ALT Latest Ref Range: 5 - 41 U/L 559 (H) 501 (H)  421 (H)   AST Latest Ref Range: <40 U/L 399 (H) 230 (H)  134 (H)   Bilirubin Latest Ref Range: 0.3 - 1.2 mg/dL 1.43 (H) 1.54 (H)  1.07   Bilirubin, Direct Latest Ref Range: <0.31 mg/dL  0.92 (H)  0.51 (H)   Bilirubin, Indirect Latest Ref Range: 0.00 - 1.00 mg/dL  0.62  0.56   Total Protein Latest Ref Range: 6.4 - 8.3 g/dL 7.1 6.9  6.5     ABG:  Lab Results   Component Value Date    KTC6ACP NOT REPORTED 01/09/2022    FIO2 NOT REPORTED 01/09/2022       Lab Results   Component Value Date    POCPH 7.414 01/09/2022    POCPCO2 40.9 01/09/2022    POCPO2 52.8 01/09/2022    POCHCO3 26.1 01/09/2022    NBEA NOT REPORTED 01/09/2022    PBEA 1 01/09/2022    MKN9FTQ NOT REPORTED 01/09/2022    KGIK9JSY 87 01/09/2022    FIO2 NOT REPORTED 01/09/2022 Results for Darshana Coburn (MRN [id number]) as of 1/15/2022 18:38   Ref. Range 1/9/2022 04:03 1/10/2022 05:23   D-Dimer, Angus Neocleus Latest Ref Range: 0.00 - 0.59 mg/L FEU 2.31 (H) 1.59 (H)   Results for Darshana Coburn (MRN [id number]) as of 1/16/2022 14:19   Ref. Range 1/9/2022 04:03 1/10/2022 05:23 1/15/2022 18:57   D-Dimer, JmScoutzie Latest Ref Range: 0.00 - 0.59 mg/L FEU 2.31 (H) 1.59 (H) 0.69 (H)    Results for Darshana Coburn (MRN [id number]) as of 1/17/2022 15:39   Ref. Range 1/13/2022 08:57 1/14/2022 08:30 1/15/2022 08:45 1/16/2022 04:54   Albumin Latest Ref Range: 3.5 - 5.2 g/dL 3.5 3.5  3.5   Globulin Latest Ref Range: 1.5 - 3.8 g/dL  NOT REPORTED  NOT REPORTED   Albumin/Globulin Ratio Latest Ref Range: 1.0 - 2.5  NOT REPORTED NOT REPORTED  NOT REPORTED   Alk Phos Latest Ref Range: 40 - 129 U/L 76 77  71   ALT Latest Ref Range: 5 - 41 U/L 559 (H) 501 (H)  421 (H)   AST Latest Ref Range: <40 U/L 399 (H) 230 (H)  134 (H)   Bilirubin Latest Ref Range: 0.3 - 1.2 mg/dL 1.43 (H) 1.54 (H)  1.07   Bilirubin, Direct Latest Ref Range: <0.31 mg/dL  0.92 (H)  0.51 (H)   Bilirubin, Indirect Latest Ref Range: 0.00 - 1.00 mg/dL  0.62  0.56   Total Protein Latest Ref Range: 6.4 - 8.3 g/dL 7.1 6.9  6.5     Radiology:  X-ray chest 1/15/2022  Bronchial wall thickening with reticulonodular opacities in the right lung   the left lung base, similar to prior.  This may reflect   infectious/inflammatory airways process it is not the typical appearance of   atypical viral pneumonitis.                    Impression:  · Acute hypoxic respiratory  failure requiring BiPAP and high flow  · COVID-19  Pneumonia  · Altered mental status/encephalopathy  · Acute kidney injury  · Suspected obstructive sleep apnea/Obesity  · A. fib, anxiety, arthritis, GERD  ·  elevated D-dimer  · Elevated liver function    Recommendations:  · Oxygen via high flow to keep saturation above 90%  · Patient declines BiPAP  · Precedex drip  · Incentive spirometry every hour while wake  ·  DuoNeb by nebulizer 4 times daily  ·  Pulmicort 0.5 q.12 hours by nebulizer  · Monitor D-dimer   · Prone as tolerated  · Clear liquids  · Lasix 40 IV x1  · Decadron 10 mg daily  · Nephrology signed off.   · Monitor mental status /provide a sitter  · Make Seroquel 75 mg twice daily  ·  bedside physical therapy  ·  discussed with RN  ·  DVT prophylaxis on heparin 5000 q.8 hours    Peter Bustamante MD  Pulmonary Critical Care and Sleep Medicine,   [phone]  cc35  min

## 2022-01-17 NOTE — PROGRESS NOTES
Comprehensive Nutrition Assessment    Type and Reason for Visit:  RD Nutrition Re-Screen/LOS (Length of stay)    Nutrition Recommendations/Plan:   1. Continue ADULT DIET; Clear Liquid; Mildly Thick (Nectar)  2. Start Ensure Clear 3x/day  3. Monitor p.o intakes, diet advancement/ tolerance, labs    Nutrition Assessment:  Patient assessed for length of stay. Patient is on high flow oxygen but would benefit from BiPap but patient pulls off the mask and is refusing to wear it. Diet advanced to clear liquids today. Will start Ensure Clear 3x/day. Advance diet when medically appropriate. Malnutrition Assessment:  Malnutrition Status: At risk for malnutrition (Comment)      Estimated Daily Nutrient Needs:  Energy (kcal):  8109-6315 kcal (11-14 kcal/kg); Weight Used for Energy Requirements:  Current     Protein (g):  102-109 gm (1.3-1.4 gm/kg); Weight Used for Protein Requirements:  Ideal          Nutrition Related Findings:  Edema: trace BUE. Active bowel sounds. High flow oxygen      Wounds:  None       Current Nutrition Therapies:    ADULT DIET;  Clear Liquid; Mildly Thick (Nectar)    Anthropometric Measures:  · Height: 5' 11\" (180.3 cm)  · Current Body Weight: 287 lb (130.2 kg)   · Admission Body Weight: 265 lb (120.2 kg)    · Ideal Body Weight: 172 lbs; % Ideal Body Weight 166.9 %   · BMI: 40  · BMI Categories: Obese Class 3 (BMI 40.0 or greater)       Nutrition Diagnosis:   · Inadequate protein-energy intake related to inadequate protein-energy intake,impaired respiratory function as evidenced by NPO or clear liquid status due to medical condition      Nutrition Interventions:   Food and/or Nutrient Delivery:  Continue Current Diet,Start Oral Nutrition Supplement  Nutrition Education/Counseling:  Education not indicated   Coordination of Nutrition Care:  Continue to monitor while inpatient    Goals:  PO intakes are greater than 75% at meals       Nutrition Monitoring and Evaluation:   Food/Nutrient Intake

## 2022-01-17 NOTE — PROGRESS NOTES
[facility]  Office: [address], DO, Wesley Gutierrez, DO, Russellbogdan Daniels, DO, Hermes Schafer, DO, Tiffanie Durham MD, Manju Urena MD, Esperanza Jerry MD, Lowell Richmond MD, Aura Ridley MD, Sulaiman Stoll MD, Dallas Pinon MD, Mendoza Linda, DO, Les Neumann DO, Chesley Saint, MD,  Glynda Fothergill, DO, Jose Griffin MD, Rand Hunt MD, Chuy Poole MD, Kathy Skinner MD, Karthik Rand MD, Dariusz Rubio MD, Verónica Lund MD, Leroy Padilla [facility], [facility] Rollyava, CNP, Dennie Hageman, CNP, Gissel Estrella, CNS, Diana Bejarano, CNP, Rylan Bauman, CNP, Bridget Jack, CNP, Aston Dorsey, CNP, Alina Haro, CNP, FELA BealC, Fabricio Eugene, DNP, Barb Lew, DNP, Deric Kee, CNP, Vinny Tavarez, CNP, Sajan Torres, CNP, Hussein Uribe CNP, Hannah Mills, [facility], Thom Cordon    Progress Note    1/17/2022    12:51 PM    Name:   Severa Skill  MRN:     [id number]     Arina Nim:      [de-identified]   Room:   [address] [place] [address] Day:  9  Admit Date:  1/8/2022  3:33 PM    PCP:   Dominique Farris MD  Code Status:  Full Code    Subjective:     C/C:   Chief Complaint   Patient presents with    Altered Mental Status     Interval History Status: not changed. Remains confused  Refuses bipap    Brief History:     Per my BRYAN:  Matthew Melendez is a 59 y.o. Non- / non  male who presents with Altered Mental Status   and is admitted to the hospital for the management of COVID-19.     Since to the emergency room with altered mental status. Is confused and not able to provide any history for me. According to the ER nurse he was brought in by his wife after a fall at home. She states that he has been altered as well. No additional information available. He was found to have mild respiratory acidosis and started on BiPAP. Covid is positive. He also had JO with BUN of 23 and creatinine of 3.09.   Previous kidney function has been normal.\"    Review of Systems:     unobtainable      Medications: Allergies: Allergies   Allergen Reactions    Latex     Bee Venom     Prednisone Other (See Comments)     Mood swings       Current Meds:   Scheduled Meds:    QUEtiapine  25 mg Oral BID    dexamethasone  10 mg IntraVENous Q24H    ipratropium-albuterol  1 ampule Inhalation 4x daily    budesonide  0.5 mg Nebulization BID    gabapentin  100 mg Oral TID    pantoprazole  40 mg IntraVENous Daily    And    sodium chloride (PF)  10 mL IntraVENous Daily    ziprasidone  20 mg IntraMUSCular Once    And    sterile water  1.2 mL IntraMUSCular Once    heparin (porcine)  5,000 Units SubCUTAneous 3 times per day    busPIRone  15 mg Oral Daily    DULoxetine  60 mg Oral Daily    finasteride  5 mg Oral Daily    cetirizine  10 mg Oral Daily    rivastigmine  1 patch TransDERmal Daily    [Held by provider] spironolactone  25 mg Oral BID    sodium chloride flush  5-40 mL IntraVENous 2 times per day    Vitamin D  2,000 Units Oral Daily     Continuous Infusions:    dexmedetomidine (PRECEDEX) IV infusion 0.5 mcg/kg/hr (01/17/22 1119)    sodium chloride       PRN Meds: morphine **OR** morphine, aluminum & magnesium hydroxide-simethicone, potassium chloride **OR** potassium alternative oral replacement **OR** potassium chloride, albuterol, metoprolol, LORazepam **OR** LORazepam, sodium chloride flush, sodium chloride, ondansetron **OR** ondansetron, magnesium hydroxide, acetaminophen **OR** acetaminophen, guaiFENesin-dextromethorphan    Data:     Past Medical History:   has a past medical history of Anxiety, Arthritis, Atrial fib/flutter, transient, Bradycardia, COPD (chronic obstructive pulmonary disease) ([address] Dementia ([address] Depression, GERD (gastroesophageal reflux disease), Neuropathy, Parkinson's disease ([address] Pneumonia, and Syncope. Social History:   reports that he has quit smoking.  His smoking use included cigarettes. He has never used smokeless tobacco. He reports that he does not drink alcohol and does not use drugs. Family History:   Family History   Family history unknown: Yes       Vitals:  BP 99/68   Pulse 69   Temp 98.5 °F (36.9 °C) (Oral)   Resp 25   Ht 5' 11\" (1.803 m)   Wt 287 lb 7 oz (130.4 kg)   SpO2 92%   BMI 40.09 kg/m²   Temp (24hrs), Av.5 °F (36.9 °C), Min:98 °F (36.7 °C), Max:99.1 °F (37.3 °C)    No results for input(s): POCGLU in the last 72 hours. I/O (24Hr): Intake/Output Summary (Last 24 hours) at 2022 1251  Last data filed at 2022 0615  Gross per 24 hour   Intake 610.87 ml   Output 400 ml   Net 210.87 ml       Labs:  Hematology:  Recent Labs     01/15/22  1857   DDIMER 0.69*     Chemistry:  Recent Labs     01/15/22  0845      K 3.3*   CL 98   CO2 24   ANIONGAP 15     Recent Labs     22  0454   PROT 6.5   LABALBU 3.5   *   *   ALKPHOS 71   BILITOT 1.07   BILIDIR 0.51*     ABG:  Lab Results   Component Value Date    POCPH 7.414 2022    POCPCO2 40.9 2022    POCPO2 52.8 2022    POCHCO3 26.1 2022    NBEA NOT REPORTED 2022    PBEA 1 2022    TSW9WXT NOT REPORTED 2022    NAIO2HPL 87 2022    FIO2 NOT REPORTED 2022     Lab Results   Component Value Date/Time    SPECIAL NOT REPORTED 2012 06:02 PM     Lab Results   Component Value Date/Time    CULTURE NO GROWTH 2012 06:02 PM    CULTURE  2012 06:02 PM     Performed at [facility] [address], [address] [phone]       Radiology:  XR ABDOMEN (KUB) (SINGLE AP VIEW)    Result Date: 2022  1. Unremarkable bowel gas pattern. XR ABDOMEN (KUB) (SINGLE AP VIEW)    Result Date: 2022  Nonobstructive bowel gas pattern. US LIVER    Result Date: 2022  Significantly limited study due to patient difficulty with cooperation during the imaging examination. Possible hepatic steatosis.   Gallbladder, pancreas and common duct not visualized. RECOMMENDATIONS: Unavailable     XR CHEST PORTABLE    Result Date: 1/15/2022  Bronchial wall thickening with reticulonodular opacities in the right lung the left lung base, similar to prior. This may reflect infectious/inflammatory airways process it is not the typical appearance of atypical viral pneumonitis. XR CHEST PORTABLE    Result Date: 1/14/2022  No significant interval change, when compared to the previous study performed 01/12/2022. XR CHEST PORTABLE    Result Date: 1/12/2022  Mild interval increase in right lower lung field opacities, when compared to the previous study performed 1 day earlier. XR CHEST PORTABLE    Result Date: 1/11/2022  No significant interval change, when compared to the previous study performed 01/08/2022, as described above. US RETROPERITONEAL COMPLETE    Result Date: 1/10/2022  Nonvisualization of the right kidney. Limited exam as described.   Otherwise unremarkable exam. RECOMMENDATIONS: Unavailable       Physical Examination:        General appearance:  Arousable, cooperative and no distress  Mental Status:  oriented to person and normal affect  Lungs:  clear to auscultation bilaterally, normal effort on hfnc  Heart:  regular rate and rhythm, no murmur  Abdomen:  soft, nontender, nondistended, normal bowel sounds, no masses, hepatomegaly, splenomegaly  Extremities:  no edema, redness, tenderness in the calves  Skin:  no gross lesions, rashes, induration    Assessment:        Hospital Problems           Last Modified POA    * (Principal) Pneumonia due to COVID-19 virus 1/9/2022 Yes    AMS (altered mental status) 1/9/2022 Yes    COPD without exacerbation ([address] 1/9/2022 Yes    A-fib ([address] 1/9/2022 Yes    Gastroesophageal reflux disease without esophagitis 1/9/2022 Yes    Neuropathy 1/9/2022 Yes    JO (acute kidney injury) ([address] 1/9/2022 Yes    Elevated troponin 1/9/2022 Yes    COVID-19 1/13/2022 Yes    Acute respiratory failure with

## 2022-01-17 NOTE — PROGRESS NOTES
Pulled apart bipap mask, obtained a new one after placing pt on high flow. Pt refused to wear bipap, was alert and oriented at the time and threatened to hit writter if I put it back on him.

## 2022-01-17 NOTE — PROGRESS NOTES
Had 11 beat run v tach with BP drop, returned to SR.  NP on call ordered cardiology consult, spoke with Dr Guicho Blanchard  Will see in am.

## 2022-01-18 NOTE — PROGRESS NOTES
Section of Cardiology  Progress Note      Date:  1/18/2022  Patient: Adriane Casey  Admission:  1/8/2022  3:33 PM  Admit DX: Acute respiratory failure with hypercapnia (HCC) [J96.02]  Altered mental status, unspecified altered mental status type [R41.82]  AMS (altered mental status) [R41.82]  COVID-19 [U07.1]  Age:  59 y.o., 1960     LOS: 10 days     Reason for evaluation:   arrhythmia      SUBJECTIVE:     The patient was seen and examined. Notes and labs reviewed. There were not complications over night. Information obtained from the patient's nurse. No reports of arrhythmia overnight. O2 requirement is declining. The patient is less demanding daily according to his nurse      OBJECTIVE:    Telemetry: Sinus  /69   Pulse 66   Temp 97.8 °F (36.6 °C) (Oral)   Resp 27   Ht 5' 11\" (1.803 m)   Wt 287 lb 7 oz (130.4 kg)   SpO2 94%   BMI 40.09 kg/m²     Intake/Output Summary (Last 24 hours) at 1/18/2022 0940  Last data filed at 1/18/2022 8585  Gross per 24 hour   Intake 575.29 ml   Output 2100 ml   Net -1524.71 ml       EXAM:   Physical exam was deferred due to COVID-19 isolation status and to minimize the spread of the infection however through the glass door the patient appeared to be comfortable and interactive with his nurse.     Current Inpatient Medications:   QUEtiapine  75 mg Oral BID    cefTRIAXone (ROCEPHIN) IV  1,000 mg IntraVENous Q24H    dexamethasone  10 mg IntraVENous Q24H    ipratropium-albuterol  1 ampule Inhalation 4x daily    budesonide  0.5 mg Nebulization BID    gabapentin  100 mg Oral TID    pantoprazole  40 mg IntraVENous Daily    And    sodium chloride (PF)  10 mL IntraVENous Daily    ziprasidone  20 mg IntraMUSCular Once    And    sterile water  1.2 mL IntraMUSCular Once    heparin (porcine)  5,000 Units SubCUTAneous 3 times per day    busPIRone  15 mg Oral Daily    DULoxetine  60 mg Oral Daily    finasteride  5 mg Oral Daily    cetirizine  10 mg Oral Daily  rivastigmine  1 patch TransDERmal Daily    [Held by provider] spironolactone  25 mg Oral BID    sodium chloride flush  5-40 mL IntraVENous 2 times per day    Vitamin D  2,000 Units Oral Daily       IV Infusions (if any):   dexmedetomidine (PRECEDEX) IV infusion 0.4 mcg/kg/hr (01/18/22 0826)    sodium chloride         Diagnostics:   EKG: . ECHO: .   Ejection fraction: %  Stress Test: .  Cardiac Angiography: .    Labs:   CBC:   Recent Labs     01/18/22  0510   WBC 9.1   HGB 13.2   HCT 40.8        BMP:   Recent Labs     01/18/22  0510   *   K 3.9   CO2 23   BUN 38*   CREATININE 0.98   LABGLOM >60   GLUCOSE 161*     No results found for: BNP  PT/INR: No results for input(s): PROTIME, INR in the last 72 hours. APTT:No results for input(s): APTT in the last 72 hours. CARDIAC ENZYMES:No results for input(s): CKTOTAL, CKMB, CKMBINDEX, TROPONINT in the last 72 hours. FASTING LIPID PANEL:  Lab Results   Component Value Date    HDL 27 09/26/2020    TRIG 186 09/26/2020     LIVER PROFILE:  Recent Labs     01/16/22  0454   *   *   LABALBU 3.5       ASSESSMENT:  1. Short nonsustained ventricular tachycardia  2. History of atrial fibrillation, status post ablation in 2014  3. Permanent pacemaker  4. Acute respiratory failure  5. COVID-19 pneumonia  6. Acute kidney injury, resolved  7. Elevated troponin, could be related to acute kidney injury and hypoxemia  8. Altered mental status  9. Elevated D-dimer  10. Elevated cardiac enzymes    Patient Active Problem List   Diagnosis    AMS (altered mental status)    COPD without exacerbation (HCC)    A-fib (HCC)    Gastroesophageal reflux disease without esophagitis    Neuropathy    [facility]    Elevated troponin    Pneumonia due to COVID-19 virus    COVID-19    Acute respiratory failure with hypercapnia (HCC)       PLAN:    1. Stable cardiac status at this point in time.   2. It will not be surprising if the patient gets more arrhythmia depending on his respiratory status. Please see orders. Discussed with  and nursing.     Rosa Rhodes MD, MD

## 2022-01-18 NOTE — PROGRESS NOTES
[facility]  Office: [address], DO, Star Maria G, DO, Moustapha Criselda, DO, Tina Schafer, DO, Antionette Bee MD, Ragini Walton MD, Celine Stinson MD, Dafne Swanson MD, Daren Klinefelter, MD, Kenneth Rabago MD, Jaron Corley MD, Cher Collet, DO, Ricke Bosworth, DO, Maribell Parnell MD,  Princess Gonzalez, DO, Clme Nicholson MD, Afshin Salinas MD, Malcom Porras MD, Marline Porter MD, Matias Simms MD, Miguel Lopez MD, Aura Ge MD, Ju Arias, [facility], [facility], CNP, Erendira Kim, CNP, Ben Monteiro, CNS, Laure Lewis, CNP, Katerina Blanco, CNP, Vonnie Ryan, CNP, Myke Arauz, CNP, Steff Manuel, CNP, Temi Porras PA-C, Mandy Schwarz, [facility], Sana Bennett, [facility], Tracy Church, CNP, Luda Hickey, CNP, Hector Coburn, CNP, Fabian Fernandes, CNP, Shubham Ovalle, [facility], [facility]    Progress Note    1/18/2022    5:48 PM    Name:   Yolis Ridley  MRN:     [id number]     Acct:      [de-identified]   Room:   [address] [place] [address] Day:  8  Admit Date:  1/8/2022  3:33 PM    PCP:   Hang Avery MD  Code Status:  Full Code    Subjective:     C/C:   Chief Complaint   Patient presents with    Altered Mental Status     Interval History Status: improved. Patient remains confused but oriented and not as agitated. Denies any chest pain, shortness of breath, nausea or vomiting, fevers or chills. Continues on high flow oxygen, did use BiPAP for approximately 2 hours earlier today.     Brief History:     Per my BRYAN:  \"Shreyas Mcadams is a 59 y.o. Non- / non  male who presents with Altered Mental Status   and is admitted to the hospital for the management of COVID-19.     Since to the emergency room with altered mental status.  Is confused and not able to provide any history for me.  According to the ER nurse he was brought in by his wife after a fall at home. Blaise Torres states that he has been altered as well.  No additional information available. Macario Loera was found to have mild respiratory acidosis and started on BiPAP.  Covid is positive.  He also had JO with BUN of 23 and creatinine of 3.09.  Previous kidney function has been normal.\"    Review of Systems:     Patient remains confused, oriented however, denied any complaint, review of systems a limited value due to    Constitutional:  negative for chills, fevers, sweats  Respiratory:  negative for cough, dyspnea on exertion, shortness of breath, wheezing  Cardiovascular:  negative for chest pain, chest pressure/discomfort, lower extremity edema, palpitations  Gastrointestinal:  negative for abdominal pain, constipation, diarrhea, nausea, vomiting  Neurological:  negative for dizziness, headache    Medications: Allergies:     Allergies   Allergen Reactions    Latex     Bee Venom     Prednisone Other (See Comments)     Mood swings       Current Meds:   Scheduled Meds:    enoxaparin  30 mg SubCUTAneous BID    famotidine  20 mg Oral BID    QUEtiapine  100 mg Oral BID    cefTRIAXone (ROCEPHIN) IV  1,000 mg IntraVENous Q24H    ipratropium-albuterol  1 ampule Inhalation 4x daily    budesonide  0.5 mg Nebulization BID    gabapentin  100 mg Oral TID    ziprasidone  20 mg IntraMUSCular Once    And    sterile water  1.2 mL IntraMUSCular Once    busPIRone  15 mg Oral Daily    DULoxetine  60 mg Oral Daily    finasteride  5 mg Oral Daily    cetirizine  10 mg Oral Daily    rivastigmine  1 patch TransDERmal Daily    [Held by provider] spironolactone  25 mg Oral BID    sodium chloride flush  5-40 mL IntraVENous 2 times per day    Vitamin D  2,000 Units Oral Daily     Continuous Infusions:    dexmedetomidine (PRECEDEX) IV infusion 0.4 mcg/kg/hr (01/18/22 0833)    sodium chloride       PRN Meds: morphine **OR** morphine, aluminum & magnesium hydroxide-simethicone, potassium chloride **OR** potassium alternative oral replacement **OR** potassium chloride, albuterol, metoprolol, LORazepam **OR** LORazepam, sodium chloride flush, sodium chloride, ondansetron **OR** ondansetron, magnesium hydroxide, acetaminophen **OR** acetaminophen, guaiFENesin-dextromethorphan    Data:     Past Medical History:   has a past medical history of Anxiety, Arthritis, Atrial fib/flutter, transient, Bradycardia, COPD (chronic obstructive pulmonary disease) ([facility] Dementia ([facility] Depression, GERD (gastroesophageal reflux disease), Neuropathy, [facility]'s disease ([facility] Pneumonia, and Syncope. Social History:   reports that he has quit smoking. His smoking use included cigarettes. He has never used smokeless tobacco. He reports that he does not drink alcohol and does not use drugs. Family History:   Family History   Family history unknown: Yes       Vitals:  /75   Pulse 92   Temp 97.8 °F (36.6 °C)   Resp 24   Ht 5' 11\" (1.803 m)   Wt 287 lb 7 oz (130.4 kg)   SpO2 93%   BMI 40.09 kg/m²   Temp (24hrs), Av.9 °F (36.6 °C), Min:97.6 °F (36.4 °C), Max:98.4 °F (36.9 °C)    No results for input(s): POCGLU in the last 72 hours. I/O (24Hr):     Intake/Output Summary (Last 24 hours) at 2022 1748  Last data filed at 2022 1600  Gross per 24 hour   Intake 1275.29 ml   Output 1900 ml   Net -624.71 ml       Labs:  Hematology:  Recent Labs     01/15/22  1857 22  0510   WBC  --  9.1   RBC  --  4.96   HGB  --  13.2   HCT  --  40.8   MCV  --  82.3*   MCH  --  26.6   MCHC  --  32.4   RDW  --  14.1   PLT  --  285   MPV  --  9.8   DDIMER 0.69* 1.02*     Chemistry:  Recent Labs     22  0510   *   K 3.9   CL 94*   CO2 23   GLUCOSE 161*   BUN 38*   CREATININE 0.98   ANIONGAP 12   LABGLOM >60   GFRAA >60   CALCIUM 8.4*   PROBNP 694*     Recent Labs     22  0454   PROT 6.5   LABALBU 3.5   *   *   ALKPHOS 71   BILITOT 1.07   BILIDIR 0.51*     ABG:  Lab Results   Component Value Date    POCPH 7.414 2022    POCPCO2 40.9 2022    POCPO2 52.8 01/09/2022    POCHCO3 26.1 01/09/2022    NBEA NOT REPORTED 01/09/2022    PBEA 1 01/09/2022    SQK8TRS NOT REPORTED 01/09/2022    [id number] 87 01/09/2022    FIO2 NOT REPORTED 01/09/2022     Lab Results   Component Value Date/Time    SPECIAL NOT REPORTED 03/30/2012 06:02 PM     Lab Results   Component Value Date/Time    CULTURE NO GROWTH 03/30/2012 06:02 PM    CULTURE  03/30/2012 06:02 PM     Performed at [facility] [id number] [place] [address] [phone]       Radiology:  XR ABDOMEN (KUB) (SINGLE AP VIEW)    Result Date: 1/16/2022  1. Unremarkable bowel gas pattern. XR ABDOMEN (KUB) (SINGLE AP VIEW)    Result Date: 1/11/2022  Nonobstructive bowel gas pattern. US LIVER    Result Date: 1/14/2022  Significantly limited study due to patient difficulty with cooperation during the imaging examination. Possible hepatic steatosis. Gallbladder, pancreas and common duct not visualized. RECOMMENDATIONS: Unavailable     XR CHEST PORTABLE    Result Date: 1/17/2022  Persistent opacities at the lung bases with some clearing of right perihilar airspace disease. XR CHEST PORTABLE    Result Date: 1/15/2022  Bronchial wall thickening with reticulonodular opacities in the right lung the left lung base, similar to prior. This may reflect infectious/inflammatory airways process it is not the typical appearance of atypical viral pneumonitis. XR CHEST PORTABLE    Result Date: 1/14/2022  No significant interval change, when compared to the previous study performed 01/12/2022. XR CHEST PORTABLE    Result Date: 1/12/2022  Mild interval increase in right lower lung field opacities, when compared to the previous study performed 1 day earlier.        Physical Examination:        General appearance:  alert, cooperative and no distress  Mental Status:  oriented to person, place and time and normal affect  Lungs:  clear to auscultation bilaterally, normal effort  Heart:  regular rate and rhythm, no murmur  Abdomen:  soft, nontender, nondistended, normal bowel sounds, no masses, hepatomegaly, splenomegaly  Extremities:  no edema, redness, tenderness in the calves  Skin:  no gross lesions, rashes, induration    Assessment:        Hospital Problems           Last Modified POA    * (Principal) Pneumonia due to COVID-19 virus 1/9/2022 Yes    AMS (altered mental status) 1/9/2022 Yes    COPD without exacerbation ([address] 1/9/2022 Yes    A-fib ([address] 1/9/2022 Yes    Gastroesophageal reflux disease without esophagitis 1/9/2022 Yes    Neuropathy 1/9/2022 Yes    JO (acute kidney injury) ([address] 1/9/2022 Yes    Elevated troponin 1/9/2022 Yes    Encephalopathy due to COVID-19 virus 1/18/2022 Yes    Acute respiratory failure with hypercapnia ([address] 1/13/2022 Yes          Plan:        1. Continue high flow oxygen and BiPAP as needed, wean as able  2. Continue Decadron per protocol  3. Avoid nephrotoxic agents, JO resolved  4. Diet as tolerated  5. GI and DVT prophylaxis  6. PT and OT  7.  Transfer of ICU the next 24 to 48 hours if remains stable    Solis Mcmahon DO  1/18/2022  5:48 PM

## 2022-01-18 NOTE — PROGRESS NOTES
Physical Therapy  Facility/Department: [facility] ICU  Daily Treatment Note  NAME: Elsi Clark  : [birth year]  MRN: [id number]    Date of Service: 2022    Discharge Recommendations:  Patient would benefit from continued therapy after discharge     Pt currently functioning below baseline. Would suggest additional therapy at time of discharge to maximize long term outcomes and prevent re-admission. Please refer to AM-PAC score for current level of function. Assessment   Body structures, Functions, Activity limitations: Decreased functional mobility ; Decreased strength;Decreased safe awareness;Decreased cognition;Decreased endurance;Decreased balance;Decreased posture  Assessment: Able to tolerate scooting EOB and sitting up x 10 min. with quick recovery when SpO2 drops to low 80s. Averaged in mid 80s while up on high flow. Will continue to progress. Prognosis: Good  Decision Making: High Complexity  PT Education: PT Role;General Safety;Plan of Care  Patient Education: Educated on safety, progressing of treatment, Tomasa lord safety, pursed lip breathing, self pacing. REQUIRES PT FOLLOW UP: Yes  Activity Tolerance  Activity Tolerance: Patient limited by endurance; Patient limited by cognitive status;Treatment limited secondary to medical complications (free text)     Patient Diagnosis(es): The primary encounter diagnosis was Altered mental status, unspecified altered mental status type. Diagnoses of Acute respiratory failure with hypercapnia ([address] and COVID-19 were also pertinent to this visit. has a past medical history of Anxiety, Arthritis, Atrial fib/flutter, transient, Bradycardia, COPD (chronic obstructive pulmonary disease) ([address] Dementia ([address] Depression, GERD (gastroesophageal reflux disease), Neuropathy, Parkinson's disease ([address] Pneumonia, and Syncope.   has a past surgical history that includes Breast surgery; Cholecystectomy; back surgery; Cardiac catheterization (); Tonsillectomy;  Hand surgery (Right); Cardiac pacemaker placement (); ablation of dysrhythmic focus (11-); and transesophageal echocardiogram (11-). Restrictions  Restrictions/Precautions  Restrictions/Precautions: General Precautions,Fall Risk,Up as Tolerated  Required Braces or Orthoses?: No  Position Activity Restriction  Other position/activity restrictions: High flow O2  Subjective   General  Chart Reviewed: Yes  Additional Pertinent Hx: Dementia, 2 liters home 02  Response To Previous Treatment: Patient with no complaints from previous session. Family / Caregiver Present: No  Subjective  Subjective: Patient agreeable for PT treatment. Reported feeling better and even joking a bit during treatment. General Comment  Comments: OK for PT per Belinda Todd RN          Orientation  Orientation  Overall Orientation Status: Impaired  Orientation Level: Oriented to situation;Disoriented to place; Disoriented to time;Oriented to person  Cognition   Cognition  Overall Cognitive Status: Exceptions  Arousal/Alertness: Delayed responses to stimuli  Following Commands:  Follows one step commands with repetition  Attention Span: Difficulty attending to directions  Memory: Decreased recall of biographical Information;Decreased recall of recent events  Safety Judgement: Decreased awareness of need for assistance;Decreased awareness of need for safety  Problem Solving: Assistance required to generate solutions;Assistance required to implement solutions;Decreased awareness of errors;Assistance required to correct errors made;Assistance required to identify errors made  Insights: Not aware of deficits  Initiation: Requires cues for all  Sequencing: Requires cues for all  Objective   Bed mobility  Supine to Sit: Maximum assistance;2 Person assistance  Sit to Supine: Maximum assistance;2 Person assistance  Scooting: Maximal assistance;2 Person assistance  Comment: Patient required MAX VCs for hand placement, safety, use of bed rail, term goal 4: Patient will be provided with cognitive stimulation/ Tolerate 30 min ther act  Patient Goals   Patient goals : No goals stated    Plan    Plan  Times per week: 1-2x/day,5-6 days/week  Current Treatment Recommendations: Strengthening,Balance Training,Transfer Training,Functional Mobility Training,Cognitive Reorientation,Gait Training,Endurance Training  Safety Devices  Type of devices: All fall risk precautions in place,Patient at risk for falls,Call light within reach,Left in bed,Nurse notified,Sitter present  Restraints  Initially in place: No  Restraints: UE restraints     Therapy Time   Individual Concurrent Group Co-treatment   Time In      1322   Time Out      1353   Minutes      32        Co-treatment with OT warranted secondary to decreased safety and independence requiring 2 skilled therapy professionals to address individual discipline's goals. PT addressing pre gait trunk strengthening, weight shifting prior to transfers, transfer training and postural control in sitting/standing.   Valeriy Hunter, PTA

## 2022-01-18 NOTE — PROGRESS NOTES
Physical Therapy  Facility/Department: [facility] ICU  Daily Treatment Note  NAME: Sana Schultz  :   MRN: [id number]    Date of Service: 2022    Discharge Recommendations:  Pt currently functioning below baseline. Would suggest additional therapy at time of discharge to maximize long term outcomes and prevent re-admission. Please refer to AM-PAC score for current level of function. Assessment   Body structures, Functions, Activity limitations: Decreased functional mobility ; Decreased strength;Decreased safe awareness;Decreased cognition;Decreased endurance;Decreased balance;Decreased posture  Assessment: Able to tolerate scooting EOB and sitting up x 10 min. with quick recovery when SpO2 drops to low 80s. Averaged in mid 80s while up on high flow. Will continue to progress. Prognosis: Good  Decision Making: High Complexity  PT Education: PT Role;General Safety;Plan of Care  Patient Education: Requires reiteration of all education  REQUIRES PT FOLLOW UP: Yes  Activity Tolerance  Activity Tolerance: Patient limited by endurance; Patient limited by cognitive status;Treatment limited secondary to medical complications (free text)     Patient Diagnosis(es): The primary encounter diagnosis was Altered mental status, unspecified altered mental status type. Diagnoses of Acute respiratory failure with hypercapnia ([address] and COVID-19 were also pertinent to this visit. has a past medical history of Anxiety, Arthritis, Atrial fib/flutter, transient, Bradycardia, COPD (chronic obstructive pulmonary disease) ([address] Dementia ([address] Depression, GERD (gastroesophageal reflux disease), Neuropathy, Parkinson's disease ([address] Pneumonia, and Syncope.   has a past surgical history that includes Breast surgery; Cholecystectomy; back surgery; Cardiac catheterization (); Tonsillectomy;  Hand surgery (Right); Cardiac pacemaker placement (23); ablation of dysrhythmic focus (2014); and transesophageal echocardiogram (11-). Restrictions  Restrictions/Precautions  Restrictions/Precautions: General Precautions,Fall Risk,Up as Tolerated  Required Braces or Orthoses?: No  Position Activity Restriction  Other position/activity restrictions: High flow O2, has 1:1 sitter  Subjective   General  Chart Reviewed: Yes  Additional Pertinent Hx: Dementia, 2 liters home 02  Family / Caregiver Present: No          Orientation  Orientation  Orientation Level: Oriented to situation;Disoriented to place; Disoriented to time;Oriented to person  Cognition      Objective   Bed mobility  Supine to Sit: Moderate assistance;2 Person assistance  Sit to Supine: Moderate assistance;2 Person assistance  Scooting: Minimal assistance (scooting to R for higher placement up in bed)  Comment: Pt .requires increased time/effort to complete. Pt able to initiate bed mob. but requires assistance from x2 staff for progression of BLEs OOB and bringing trunk upward d/t decreased strength. Max VCx for sequencing, proper tech, pursed lip breathing, pacing, sitting with tall posture, and awareness/assist with multiple lines for increased safety. SpO2 averaging in mid 80s while sitting EOB on high flow O2. Pt. would quickly recover when dropping towards low 80s. Resting back in bed, pt's SpO2 in low 90s. Transfers  Comment: Not approp. to stand at this time. Balance  Posture: Poor  Sitting - Static: Good;-  Sitting - Dynamic: Fair;+  Exercises  Knee Long Arc Quad: 7  Ankle Pumps: 10  Comments: seated postural ex. - pt. with difficulty and not wanting to receive edu. as to importance of.  Pt. attempted to ant. pelvic tilt and retract scap. on several occasions. Sat EOB x 10 min. Able to scoot along EOB with min-mod A x 5 prior to returning to supine.                         G-Code     OutComes Score                                                     AM-PAC Score   raw score- 8          Goals  Short term goals  Time Frame for Short term goals: 14 treatments  Short term goal 1: Independent bed mobility/ Transfers Min A x 1/ monitor HR  Short term goal 2: Initiate ambulation w/ RW/ Monitor HR  Short term goal 3: Good sitting balance/posture/ Fair + standing balance  Short term goal 4: Patient will be provided with cognitive stimulation/ Tolerate 30 min ther act  Patient Goals   Patient goals : No goals stated    Plan    Plan  Times per week: 1-2x/day,5-6 days/week  Current Treatment Recommendations: Strengthening,Balance Training,Transfer Training,Functional Mobility Training,Cognitive Reorientation,Gait Training,Endurance Training  Safety Devices  Type of devices: All fall risk precautions in place,Patient at risk for falls,Call light within reach,Left in bed,Nurse notified,Sitter present  Restraints  Initially in place: No  Restraints: UE restraints     Therapy Time   Individual Concurrent Group Co-treatment   Time In 0         Time Out 1449         Minutes 32              Co-treatment with OT warranted secondary to decreased safety and independence requiring 2 skilled therapy professionals to address individual discipline's goals. PT addressing pre gait trunk strengthening, weight shifting for pre-transfer training and postural control in sitting.       Ivan Ackerman, PT

## 2022-01-18 NOTE — PROGRESS NOTES
Pulmonary Critical Care Progress Note       Patient seen for the follow up of acute hypoxic respiratory insufficiency, Pneumonia due to COVID-19 virus     Subjective:  He has been on high flow at 90%. He declined BiPAP in spite of Precedex. He still requires Precedex drip. He has been tolerating oral intake   he is not ambulating. Examination:  Vitals: /68   Pulse 71   Temp 97.6 °F (36.4 °C) (Oral)   Resp 22   Ht 5' 11\" (1.803 m)   Wt 287 lb 7 oz (130.4 kg)   SpO2 93%   BMI 40.09 kg/m²   General appearance:   Awake follows commands  Neck: No JVD  Lungs:  Decreased breath sounds no crackles or wheezing  Heart: regular rate and rhythm, S1, S2 normal, no gallop  Abdomen: Soft, non tender, + BS  Extremities: no cyanosis or clubbing. No significant edema    LABs:  BMP:   Recent Labs     01/18/22  0510   *   K 3.9   CO2 23   BUN 38*   CREATININE 0.98   LABGLOM >60   GLUCOSE 161*   Results for Lindsey Garcia (MRN [id number]) as of 1/16/2022 14:19   Ref.  Range 1/13/2022 08:57 1/14/2022 08:30 1/15/2022 08:45 1/16/2022 04:54   Albumin/Globulin Ratio Latest Ref Range: 1.0 - 2.5  NOT REPORTED NOT REPORTED  NOT REPORTED   Alk Phos Latest Ref Range: 40 - 129 U/L 76 77  71   ALT Latest Ref Range: 5 - 41 U/L 559 (H) 501 (H)  421 (H)   AST Latest Ref Range: <40 U/L 399 (H) 230 (H)  134 (H)   Bilirubin Latest Ref Range: 0.3 - 1.2 mg/dL 1.43 (H) 1.54 (H)  1.07   Bilirubin, Direct Latest Ref Range: <0.31 mg/dL  0.92 (H)  0.51 (H)   Bilirubin, Indirect Latest Ref Range: 0.00 - 1.00 mg/dL  0.62  0.56   Total Protein Latest Ref Range: 6.4 - 8.3 g/dL 7.1 6.9  6.5     ABG:  Lab Results   Component Value Date    JVL0OPL NOT REPORTED 01/09/2022    FIO2 NOT REPORTED 01/09/2022       Lab Results   Component Value Date    POCPH 7.414 01/09/2022    POCPCO2 40.9 01/09/2022    POCPO2 52.8 01/09/2022    POCHCO3 26.1 01/09/2022    NBEA NOT REPORTED 01/09/2022    PBEA 1 01/09/2022    JUW6HSN NOT REPORTED 01/09/2022    SRXY7EDR 87 01/09/2022    FIO2 NOT REPORTED 01/09/2022   Results for Dori Woodall (MRN [id number]) as of 1/15/2022 18:38   Ref. Range 1/9/2022 04:03 1/10/2022 05:23   D-Dimer, Melford Kitten Latest Ref Range: 0.00 - 0.59 mg/L FEU 2.31 (H) 1.59 (H)   Results for Dori Woodall (MRN [id number]) as of 1/16/2022 14:19   Ref. Range 1/9/2022 04:03 1/10/2022 05:23 1/15/2022 18:57   D-Dimer, Melford Kitten Latest Ref Range: 0.00 - 0.59 mg/L FEU 2.31 (H) 1.59 (H) 0.69 (H)    Results for Dori Woodall (MRN [id number]) as of 1/17/2022 15:39   Ref. Range 1/13/2022 08:57 1/14/2022 08:30 1/15/2022 08:45 1/16/2022 04:54   Albumin Latest Ref Range: 3.5 - 5.2 g/dL 3.5 3.5  3.5   Globulin Latest Ref Range: 1.5 - 3.8 g/dL  NOT REPORTED  NOT REPORTED   Albumin/Globulin Ratio Latest Ref Range: 1.0 - 2.5  NOT REPORTED NOT REPORTED  NOT REPORTED   Alk Phos Latest Ref Range: 40 - 129 U/L 76 77  71   ALT Latest Ref Range: 5 - 41 U/L 559 (H) 501 (H)  421 (H)   AST Latest Ref Range: <40 U/L 399 (H) 230 (H)  134 (H)   Bilirubin Latest Ref Range: 0.3 - 1.2 mg/dL 1.43 (H) 1.54 (H)  1.07   Bilirubin, Direct Latest Ref Range: <0.31 mg/dL  0.92 (H)  0.51 (H)   Bilirubin, Indirect Latest Ref Range: 0.00 - 1.00 mg/dL  0.62  0.56   Total Protein Latest Ref Range: 6.4 - 8.3 g/dL 7.1 6.9  6.5   Results for Dori Woodall (MRN [id number]) as of 1/18/2022 14:45   Ref.  Range 1/18/2022 05:10   Pro-BNP Latest Ref Range: <300 pg/mL 694 (H)     Radiology:  X-ray chest 1/17/2022  Persistent opacities at the lung bases with some clearing of right perihilar   airspace disease.             Impression:  · Acute hypoxic respiratory  failure requiring BiPAP and high flow  · COVID-19  Pneumonia  · Altered mental status/encephalopathy  · Acute kidney injury  · Suspected obstructive sleep apnea/Obesity  · A. fib, anxiety, arthritis, GERD  ·  elevated D-dimer  · Elevated liver function    Recommendations:  · Oxygen via high flow to keep saturation above 90%  · BiPAP as tolerated  · Precedex drip  · Incentive spirometry every hour while wake  ·  DuoNeb by nebulizer 4 times daily  ·  Pulmicort 0.5 q.12 hours by nebulizer  · Monitor D-dimer   · Prone as tolerated  · Thickened liquids  · Lasix 40 IV x1  · Decadron 10 mg daily  · Nephrology signed off.   · Monitor mental status   · Make [id number] mg twice daily  ·  bedside physical therapy  ·  discussed with RN  ·  DVT prophylaxis on heparin 5000 q.8 hours    Luc Soto MD  Pulmonary Critical Care and Sleep Medicine,   [phone]

## 2022-01-18 NOTE — PROGRESS NOTES
Occupational Therapy  Facility/Department: [facility] ICU  Daily Treatment Note  NAME: Camilo Arana  :   MRN: [id number]    Date of Service: 2022   BRANDON Reed reports patient is medically stable for therapy treatment this date. Chart reviewed prior to treatment and patient is agreeable for therapy. All lines intact and patient positioned comfortably at end of treatment. All patient needs addressed prior to ending therapy session. Discharge Recommendations:  Patient would benefit from continued therapy after discharge   Pt currently functioning below baseline. Would suggest additional therapy at time of discharge to maximize long term outcomes and prevent re-admission. Please refer to AM-PAC score for current level of function. Assessment   Performance deficits / Impairments: Decreased functional mobility ; Decreased ADL status; Decreased strength;Decreased safe awareness;Decreased cognition;Decreased endurance;Decreased sensation;Decreased balance;Decreased posture  Assessment: Pt with deficits of strength, bed mobility, transfers,  balance, safety awareness and endurance this session,  & required 2 assist for safety & transfers. With current deficits, Pt HIGH risk for falls & requires continued OT to maximize independence with functional mobility, balance, safety awareness & activity tolerance. Prognosis: Fair  OT Education: OT Role;Plan of Care;Precautions; ADL Adaptive Strategies;Transfer Training;Energy Conservation  Patient Education: participation in therapy, pursed lip breathing, benefits of sitting up for pressure relief and overall strengthening, postural awareness  Barriers to Learning: cognition, tending to be agitated/agressive  REQUIRES OT FOLLOW UP: Yes  Activity Tolerance  Activity Tolerance: Treatment limited secondary to decreased cognition;Patient limited by fatigue;Patient Tolerated treatment well  Safety Devices  Safety Devices in place: Yes  Type of devices: Call light within reach;Nurse notified; Left in bed;Patient at risk for falls; Bed alarm in place         Patient Diagnosis(es): The primary encounter diagnosis was Altered mental status, unspecified altered mental status type. Diagnoses of Acute respiratory failure with hypercapnia ([address] and COVID-19 were also pertinent to this visit. has a past medical history of Anxiety, Arthritis, Atrial fib/flutter, transient, Bradycardia, COPD (chronic obstructive pulmonary disease) ([address] Dementia ([facility] [address] Depression, GERD (gastroesophageal reflux disease), Neuropathy, Parkinson's disease ([facility] [address] Pneumonia, and Syncope.   has a past surgical history that includes Breast surgery; Cholecystectomy; back surgery; Cardiac catheterization (2010); Tonsillectomy; Hand surgery (Right); Cardiac pacemaker placement (); ablation of dysrhythmic focus (11-); and transesophageal echocardiogram (11-). Restrictions  Restrictions/Precautions  Restrictions/Precautions: General Precautions,Fall Risk,Up as Tolerated  Required Braces or Orthoses?: No  Position Activity Restriction  Other position/activity restrictions: High flow O2  Subjective   General  Chart Reviewed: Yes  Patient assessed for rehabilitation services?: Yes  General Comment  Comments: Pt supine in bed agreeable to co-treat with PT.  Pt appearing happier today and not agitated with staff      Orientation  Orientation  Overall Orientation Status: Impaired  Orientation Level: Oriented to person;Disoriented to situation;Disoriented to time;Oriented to place  Objective             Balance  Sitting Balance: Contact guard assistance (CGA-SBA)  Standing Balance: Maximum assistance (x2 w/marisol stedy)  Standing Balance  Comment: Pt SpO2 decreasing to low 80s with standing and once resting supine in bed with HOB raised read above 90%  Functional Mobility  Functional Mobility Comments: Not appriopriate at this time  Bed mobility  Supine to Sit: 2 Person assistance;Maximum assistance  Sit to Supine: Maximum assistance;2 Person assistance  Scooting: Maximal assistance;2 Person assistance  Comment: Requires increased time/effort to complete. Needs assistance for progression of trunk and BLEs in/out of bed d/t decreased strength. Pt has difficulty sitting upright verbalizing he has a pinched nerve in his neck. Max vc's for proper posture, proper bed mobility, pursed lip breathing, pursed lip breathing. and awareness/assist with multiple lines. Pt sitting EOB for approx 8 min before standing in marisol stedy. Pt SpO2 difficult to read d/t inaccurate wavelength. Would show decreasing to low 80s and then gradually increase to high 80s  Transfers  Sit to stand: Maximum assistance;2 Person assistance (w/marisol stedy)  Stand to sit: Maximum assistance;2 Person assistance  Transfer Comments: Pt able to  marisol stedy to position higher up in bed. Pt's LLE has tendency to slide forward. Max vc's for slow/controlled transfer, pursed lip breathing, nose over toes, proper hand placement on marisol stedy, upright posture, and awareness/assist with lines for increased safety/decreased risk of falls. Cognition  Overall Cognitive Status: Exceptions  Arousal/Alertness: Delayed responses to stimuli  Following Commands:  Follows one step commands with repetition  Attention Span: Difficulty attending to directions  Memory: Decreased recall of biographical Information;Decreased recall of recent events  Safety Judgement: Decreased awareness of need for assistance;Decreased awareness of need for safety  Problem Solving: Assistance required to generate solutions;Assistance required to implement solutions;Decreased awareness of errors;Assistance required to correct errors made;Assistance required to identify errors made  Insights: Not aware of deficits  Initiation: Requires cues for all  Sequencing: Requires cues for all                                         Plan   Plan  Times per week: 3-4x/week  Current Treatment Recommendations: Strengthening,Balance Training,Functional Mobility Training,Endurance Training,Safety Education & Training,Self-Care / ADL,Patient/Caregiver Education & Training,Equipment Evaluation, Education, & procurement,Positioning,Cognitive Reorientation,Cognitive/Perceptual Training                                                  AM-PAC Score        AM-PAC Inpatient Daily Activity Raw Score: 8 (01/18/22 1553)  AM-PAC Inpatient ADL T-Scale Score : 22.86 (01/18/22 1553)  ADL Inpatient CMS 0-100% Score: 85.69 (01/18/22 1553)  ADL Inpatient CMS G-Code Modifier : CM (01/18/22 1553)    Goals  Short term goals  Time Frame for Short term goals: by discharge, pt will  Short term goal 1: tolerate reassessment of ADL transfers/mob as tolerated  Short term goal 2: demo min A with simple grooming tasks and self feeding following set up and min cues for initiation/sequencing  Short term goal 3: participate in AROM of UEs for inc strength for mob/ADLs  Short term goal 4: demo mod Ax 1 with bed mob with rails/controls  Patient Goals   Patient goals : not stated       Therapy Time   Co-treat Concurrent Group Co-treatment   Time In [facility]         Time Out 5865         Minutes 32               Co-treatment with PT warranted secondary to decreased safety and independence requiring 2 skilled therapy professionals to address individual discipline's goals. OT addressing preparation for ADL transfer, sitting balance for increased ADL performance, sitting/activity tolerance, functional reaching, environmental safety/scanning, fall prevention, functional mobility for ADL transfers, ability to sequence and follow directions and functional UE strength. Upon writer exit, call light within reach, pt retired to bed. All lines intact and patient positioned comfortably. All patient needs addressed prior to ending therapy session. Chart reviewed prior to treatment and patient is agreeable for therapy.   RN reports patient is medically stable for therapy treatment this date.       Felicity Mckeon FERMIN

## 2022-01-19 NOTE — PROGRESS NOTES
Occupational Therapy  Facility/Department: [facility] ICU  Daily Treatment Note  NAME: Kaur Camacho  :   MRN: [id number]    Date of Service: 2022   RN Oralia Gerard reports patient is medically stable for therapy treatment this date. Chart reviewed prior to treatment and patient is agreeable for therapy. All lines intact and patient positioned comfortably at end of treatment. All patient needs addressed prior to ending therapy session. Discharge Recommendations:  Patient would benefit from continued therapy after discharge   Pt currently functioning below baseline. Would suggest additional therapy at time of discharge to maximize long term outcomes and prevent re-admission. Please refer to AM-PAC score for current level of function. Assessment   Performance deficits / Impairments: Decreased functional mobility ; Decreased ADL status; Decreased strength;Decreased safe awareness;Decreased cognition;Decreased endurance;Decreased sensation;Decreased balance;Decreased posture  Assessment: Pt with deficits of strength, bed mobility, transfers,  balance, safety awareness and endurance this session,  & required 2 assist for safety & transfers. With current deficits, Pt HIGH risk for falls & requires continued OT to maximize independence with functional mobility, balance, safety awareness & activity tolerance. Prognosis: Fair  OT Education: OT Role;Plan of Care;Precautions; ADL Adaptive Strategies;Transfer Training;Energy Conservation  Patient Education: participation in therapy, pursed lip breathing, benefits of sitting up for pressure relief and overall strengthening, postural awareness  Barriers to Learning: cognition, tending to be agitated/agressive  REQUIRES OT FOLLOW UP: Yes  Activity Tolerance  Activity Tolerance: Treatment limited secondary to decreased cognition;Patient limited by fatigue;Patient Tolerated treatment well  Safety Devices  Safety Devices in place: Yes  Type of devices: Call light within reach;Nurse notified; Patient at risk for falls; Left in chair;Chair alarm in place         Patient Diagnosis(es): The primary encounter diagnosis was Altered mental status, unspecified altered mental status type. Diagnoses of Acute respiratory failure with hypercapnia ([address] and COVID-19 were also pertinent to this visit. has a past medical history of Anxiety, Arthritis, Atrial fib/flutter, transient, Bradycardia, COPD (chronic obstructive pulmonary disease) ([address] Dementia ([address] Depression, GERD (gastroesophageal reflux disease), Neuropathy, Parkinson's disease ([address] Pneumonia, and Syncope.   has a past surgical history that includes Breast surgery; Cholecystectomy; back surgery; Cardiac catheterization (2010); Tonsillectomy; Hand surgery (Right); Cardiac pacemaker placement (); ablation of dysrhythmic focus (11-); and transesophageal echocardiogram (11-).     Restrictions  Restrictions/Precautions  Restrictions/Precautions: General Precautions,Fall Risk,Up as Tolerated  Required Braces or Orthoses?: No  Position Activity Restriction  Other position/activity restrictions: High flow O2  Subjective   General  Chart Reviewed: Yes  Patient assessed for rehabilitation services?: Yes  Response to previous treatment: Patient with no complaints from previous session  Family / Caregiver Present:   Subjective  Subjective: \"Yeah I wanna sit up in that chair\"  General Comment  Comments: Pt supine in bed agreeable to co-treat with PT      Orientation  Orientation  Overall Orientation Status: Impaired  Orientation Level: Oriented to person;Disoriented to situation;Disoriented to time;Oriented to place  Objective             Balance  Sitting Balance: Minimal assistance (Min A required for upright sitting d/t L lateral lean)  Standing Balance: Maximum assistance (x2 with marisol lord)  Standing Balance  Comment: SpO2 maintained between high 80s to low 90s during transfer  Bed mobility  Supine to Sit: Maximum assistance;2 Person assistance  Sit to Supine: Unable to assess (pt retired to recliner)  Scooting: Maximal assistance;2 Person assistance  Comment: Pt requires increased time/effort to complete and x2 staff members for increased safety. Pt needed assistance with progression of trunk and BLEs to EOB. While sitting EOB pt was leaning heavily to left side. Max vc's for upright posture, scooting fully to EOB to place BLEs on floor to establish safe sitting balance, pused lip breathing, proper bed mobility tech, use of bed rails, and awareness/assist with lines for increased safety. Pt SpO2 fluctuated between mid/high 80s-low 90s. Difficulty reading SpO2 d/t inaccurate wavelength. Transfers  Sit to stand: Maximum assistance;2 Person assistance (w/marisol stedy)  Stand to sit: Maximum assistance;2 Person assistance  Transfer Comments: Josydu Medel stedy used for standing and transferring to recliner. Max vc's for slow/controlled transfer, pursed lip breathing, nose over toes, proper hand placement on marisol stedy, upright posture, and awareness/assist with MULTIPLE lines for increased safety/decreased risk of falls. Pt positioned in chair with multiple pillows around him to promote upright sitting in recliner. Cognition  Overall Cognitive Status: Exceptions  Arousal/Alertness: Delayed responses to stimuli  Following Commands:  Follows one step commands with repetition  Attention Span: Difficulty attending to directions  Memory: Decreased recall of biographical Information;Decreased recall of recent events  Safety Judgement: Decreased awareness of need for assistance;Decreased awareness of need for safety  Problem Solving: Assistance required to generate solutions;Assistance required to implement solutions;Decreased awareness of errors;Assistance required to correct errors made;Assistance required to identify errors made  Insights: Not aware of deficits  Initiation: Requires cues for all  Sequencing: Requires cues for all                                         Plan   Plan  Times per week: 3-4x/week  Current Treatment Recommendations: Strengthening,Balance Training,Functional Mobility Training,Endurance Training,Safety Education & Training,Self-Care / ADL,Patient/Caregiver Education & Training,Equipment Evaluation, Education, & procurement,Positioning,Cognitive Reorientation,Cognitive/Perceptual Training                                                    AM-PAC Score        AM-PAC Inpatient Daily Activity Raw Score: 8 (01/19/22 1507)  AM-PAC Inpatient ADL T-Scale Score : 22.86 (01/19/22 1507)  ADL Inpatient CMS 0-100% Score: 85.69 (01/19/22 1507)  ADL Inpatient CMS G-Code Modifier : CM (01/19/22 1507)    Goals  Short term goals  Time Frame for Short term goals: by discharge, pt will  Short term goal 1: tolerate reassessment of ADL transfers/mob as tolerated  Short term goal 2: demo min A with simple grooming tasks and self feeding following set up and min cues for initiation/sequencing  Short term goal 3: participate in AROM of UEs for inc strength for mob/ADLs  Short term goal 4: demo mod Ax 1 with bed mob with rails/controls  Patient Goals   Patient goals : not stated       Therapy Time   Co-Treat Concurrent Group Co-treatment   Time In 1117         Time Out 4671         Minutes 55               Co-treatment with PT warranted secondary to decreased safety and independence requiring 2 skilled therapy professionals to address individual discipline's goals. OT addressing preparation for ADL transfer, sitting balance for increased ADL performance, sitting/activity tolerance, functional reaching, environmental safety/scanning, fall prevention, functional mobility for ADL transfers, ability to sequence and follow directions and functional UE strength. Upon writer exit, call light within reach, pt retired to chair. All lines intact and patient positioned comfortably. Chair alarm in place.   All patient needs addressed prior to ending therapy session. Chart reviewed prior to treatment and patient is agreeable for therapy. RN reports patient is medically stable for therapy treatment this date.     FERMIN Read

## 2022-01-19 NOTE — PLAN OF CARE
Problem: Pain:  Goal: Pain level will decrease  Description: Pain level will decrease  Outcome: Ongoing  Goal: Control of acute pain  Description: Control of acute pain  Outcome: Ongoing  Goal: Control of chronic pain  Description: Control of chronic pain  Outcome: Ongoing     Problem: Falls - Risk of:  Goal: Will remain free from falls  Description: Will remain free from falls  Outcome: Ongoing  Goal: Absence of physical injury  Description: Absence of physical injury  Outcome: Ongoing     Problem: Airway Clearance - Ineffective  Goal: Achieve or maintain patent airway  Outcome: Ongoing     Problem: Airway Clearance - Ineffective  Goal: Achieve or maintain patent airway  Outcome: Ongoing     Problem: Gas Exchange - Impaired  Goal: Absence of hypoxia  Outcome: Ongoing  Goal: Promote optimal lung function  Outcome: Ongoing     Problem: Breathing Pattern - Ineffective  Goal: Ability to achieve and maintain a regular respiratory rate  Outcome: Ongoing     Problem: Injury - Risk of, Physical Injury:  Goal: Will remain free from falls  Description: Will remain free from falls  Outcome: Ongoing  Goal: Absence of physical injury  Description: Absence of physical injury  Outcome: Ongoing     Problem: Breathing Pattern - Ineffective  Goal: Ability to achieve and maintain a regular respiratory rate  Outcome: Ongoing

## 2022-01-19 NOTE — PROGRESS NOTES
Physical Therapy  Facility/Department: [facility] ICU  Daily Treatment Note  NAME: Danis Holloway  : [id number]  MRN: [id number]    Date of Service: 2022    Discharge Recommendations:  Patient would benefit from continued therapy after discharge     Pt currently functioning below baseline. Would suggest additional therapy at time of discharge to maximize long term outcomes and prevent re-admission. Please refer to AM-PAC score for current level of function. Assessment   Body structures, Functions, Activity limitations: Decreased functional mobility ; Decreased strength;Decreased safe awareness;Decreased cognition;Decreased endurance;Decreased balance;Decreased posture  Assessment: Patient is limited fatigued and increased Lt side lateral lean requiring increased assist today, RN notified. Patient was able to tolerate transfer to bed side recliner today. Prognosis: Good  Decision Making: High Complexity  PT Education: PT Role;General Safety;Plan of Care;Transfer Training;Energy Conservation; Injury Prevention;Pressure Relief; Functional Mobility Training;Goals  REQUIRES PT FOLLOW UP: Yes  Activity Tolerance  Activity Tolerance: Patient limited by endurance; Patient limited by cognitive status;Treatment limited secondary to medical complications (free text)     Patient Diagnosis(es): The primary encounter diagnosis was Altered mental status, unspecified altered mental status type. Diagnoses of Acute respiratory failure with hypercapnia ([address] and COVID-19 were also pertinent to this visit. has a past medical history of Anxiety, Arthritis, Atrial fib/flutter, transient, Bradycardia, COPD (chronic obstructive pulmonary disease) ([address] Dementia ([address] Depression, GERD (gastroesophageal reflux disease), Neuropathy, Parkinson's disease ([address] Pneumonia, and Syncope.   has a past surgical history that includes Breast surgery; Cholecystectomy; back surgery; Cardiac catheterization (); Tonsillectomy;  Hand surgery (Right); Cardiac pacemaker placement (); ablation of dysrhythmic focus (11-); and transesophageal echocardiogram (11-). Restrictions  Restrictions/Precautions  Restrictions/Precautions: General Precautions,Fall Risk,Up as Tolerated  Required Braces or Orthoses?: No  Position Activity Restriction  Other position/activity restrictions: High flow O2, ICU monitors and Rt UE IV  Subjective   General  Chart Reviewed: Yes  Additional Pertinent Hx: Dementia, 2 liters home 02  Response To Previous Treatment: Patient with no complaints from previous session. Family / Caregiver Present: No  Subjective  Subjective: Patient agreeable for PT treatment. Upon arrival noted patient leaning to the Lt side with poor correction with given VCs. General Comment  Comments: OK for PT per [facility]  RN  Pain Screening  Patient Currently in Pain: Yes  Vital Signs  Patient Currently in Pain: Yes       Orientation  Orientation  Overall Orientation Status: Impaired  Orientation Level: Oriented to situation;Disoriented to place; Disoriented to time;Oriented to person  Cognition   Cognition  Overall Cognitive Status: Exceptions  Arousal/Alertness: Delayed responses to stimuli  Following Commands:  Follows one step commands with repetition  Attention Span: Difficulty attending to directions  Memory: Decreased recall of biographical Information;Decreased recall of recent events  Safety Judgement: Decreased awareness of need for assistance;Decreased awareness of need for safety  Problem Solving: Assistance required to generate solutions;Assistance required to implement solutions;Decreased awareness of errors;Assistance required to correct errors made;Assistance required to identify errors made  Insights: Not aware of deficits  Initiation: Requires cues for all  Sequencing: Requires cues for all  Objective   Bed mobility  Rolling to Left: Maximum assistance;2 Person assistance  Rolling to Right: Maximum assistance;2 Person assistance  Supine to Sit: Maximum assistance;2 Person assistance  Scooting: Maximal assistance;2 Person assistance  Comment: Patient required increased effort and time to complete bed mobiltiy and increased assistance from yesterday. Patient SpO2 88-90s. MAX VCs for hand placement, reaching for/use of bed rail, lof roll tech d/t neck pain, and line mgmt. Transfers  Sit to Stand: Maximum Assistance;2 Person Assistance  Stand to sit: Maximum Assistance;2 Person Assistance  Comment: Max x 2 A to perform STS in marisol lord transferred to bedside recliner with oswald lift sling under patient for staff to put him back in bed later. Patient SpO2 in high 80s, focus on pursed lip breathing  Ambulation  Ambulation?: No  Stairs/Curb  Stairs?: No  Neuromuscular Education  NDT Treatment: Gait ;Sitting;Standing  Neuromuscular Comments: VCs req for proper breathing linda (pursed lip breathing) during functional mobility. Tactile and VCs req for postural control during sit<>stands & amb to promote abdominal and erector spinae mm facilitation for increased stability and balance, decreasing kyphosis of the spine. Pt req VCs to correct for anterior translation of femur with squatting in addition to pressing firmly into ground with feet, to promote the appropriate body mechanics for sit<>stand transfers.   Balance  Posture: Poor  Sitting - Static: Good;-  Sitting - Dynamic: Fair;+  Standing - Static: Poor  Comments: Standing within [facility]     AM-PAC Score  AM-PAC Inpatient Mobility Raw Score : 8 (01/19/22 1802)  AM-PAC Inpatient T-Scale Score : 28.52 (01/19/22 1802)  Mobility Inpatient CMS 0-100% Score: 86.62 (01/19/22 1802)  Mobility Inpatient CMS G-Code Modifier : CM (01/19/22 1802)          Goals  Short term goals  Time Frame for Short term goals: 14 treatments  Short term goal 1: Independent bed mobility/ Transfers Min A x 1/ monitor HR  Short term goal 2: Initiate ambulation w/ RW/ Monitor HR  Short term goal 3: Good sitting

## 2022-01-19 NOTE — PROGRESS NOTES
[facility]  Office: [address], DO, Cricket Rodriguez, DO, Kathleen Minesh, DO, Emma Marie Blood, DO, Yulia Andersen MD, Marilee Acevedo MD, Zuleika Hagen MD, Goyo Banegas MD, Jeff Mayorga MD, Wayne Vaughn MD, Michelle Dias MD, Curtis Coronado, DO, Gabe Flores, DO, Cordelia Barthel, MD,  Ashutosh Barrientos, DO, Maninder Up MD, Emeterio Holloway MD, Elisa Evans MD, Arleen Sellers MD, Benjy Monae MD, Damaris Franks MD, Tabitha Phillips MD, Lillian Emmanuel, [facility], [facility] Lenny, CNP, Billie Donahue, CNP, Sai Matson, CNS, Mindy Jimenez, CNP, Jose Short, CNP, Tammi Mitchell, CNP, Eddy Andrade, CNP, Aure Minor, CNP, Tabatha Davidson PA-C, Gilma Tai, [facility], Rafael Cheung, [facility], Liban Ortiz, CNP, Bibi Lozano, CNP, Parvin Lambert, CNP, Lisa Harman, CNP, Michael Cuenca, CNP, Thom eDan [facility]    Progress Note    1/19/2022    5:50 PM    Name:   Cody Dumont  MRN:     [id number]     Acct:      [de-identified]   Room:   [address] [place] [address] Day:  6  Admit Date:  1/8/2022  3:33 PM    PCP:   Goran Clayton MD  Code Status:  Full Code    Subjective:     C/C:   Chief Complaint   Patient presents with    Altered Mental Status     Interval History Status: improved. Patient sitting up in chair, denies any complaints of chest pain, nausea vomiting, fevers or chills or other acute complaints. Mentation continues to improve, oriented but mildly confused.     Brief History:     Per my BRYAN:  \"Shreyas Mcadams is a 59 y.o. Non- / non  male who presents with Altered Mental Status   and is admitted to the hospital for the management of COVID-19.     Since to the emergency room with altered mental status.  Is confused and not able to provide any history for me.  According to the ER nurse he was brought in by his wife after a fall at home. Tyson Perez states that he has been altered as well.  No additional information available. Randall Breen was found to have mild respiratory acidosis and started on BiPAP.  Covid is positive.  He also had JO with BUN of 23 and creatinine of 3.09.  Previous kidney function has been normal.\"    Review of Systems:     Constitutional:  negative for chills, fevers, sweats  Respiratory:  negative for cough, dyspnea on exertion, shortness of breath, wheezing  Cardiovascular:  negative for chest pain, chest pressure/discomfort, lower extremity edema, palpitations  Gastrointestinal:  negative for abdominal pain, constipation, diarrhea, nausea, vomiting  Neurological:  negative for dizziness, headache    Medications: Allergies:     Allergies   Allergen Reactions    Latex     Bee Venom     Prednisone Other (See Comments)     Mood swings       Current Meds:   Scheduled Meds:    metoprolol tartrate  25 mg Oral BID    oxyCODONE  60 mg Oral TID    enoxaparin  30 mg SubCUTAneous BID    famotidine  20 mg Oral BID    QUEtiapine  100 mg Oral BID    ipratropium-albuterol  1 ampule Inhalation 4x daily    budesonide  0.5 mg Nebulization BID    gabapentin  100 mg Oral TID    ziprasidone  20 mg IntraMUSCular Once    And    sterile water  1.2 mL IntraMUSCular Once    busPIRone  15 mg Oral Daily    DULoxetine  60 mg Oral Daily    finasteride  5 mg Oral Daily    cetirizine  10 mg Oral Daily    rivastigmine  1 patch TransDERmal Daily    [Held by provider] spironolactone  25 mg Oral BID    sodium chloride flush  5-40 mL IntraVENous 2 times per day    Vitamin D  2,000 Units Oral Daily     Continuous Infusions:    dexmedetomidine (PRECEDEX) IV infusion Stopped (01/18/22 1210)    sodium chloride       PRN Meds: HYDROcodone-acetaminophen, aluminum & magnesium hydroxide-simethicone, potassium chloride **OR** potassium alternative oral replacement **OR** potassium chloride, albuterol, metoprolol, LORazepam **OR** LORazepam, sodium chloride flush, sodium chloride, ondansetron **OR** ondansetron, magnesium hydroxide, acetaminophen **OR** acetaminophen, guaiFENesin-dextromethorphan    Data:     Past Medical History:   has a past medical history of Anxiety, Arthritis, Atrial fib/flutter, transient, Bradycardia, COPD (chronic obstructive pulmonary disease) ([facility] [address] D[facility] ([email or web address] Depression, GERD (gastroesophageal reflux disease), Neuropathy, Parkinson's disease ([email or web address] Pneumonia, and Syncope. Social History:   reports that he has quit smoking. His smoking use included cigarettes. He has never used smokeless tobacco. He reports that he does not drink alcohol and does not use drugs. Family History:   Family History   Family history unknown: Yes       Vitals:  /71   Pulse 92   Temp 99 °F (37.2 °C) (Oral)   Resp 26   Ht 5' 11\" (1.803 m)   Wt 287 lb 7 oz (130.4 kg)   SpO2 96%   BMI 40.09 kg/m²   Temp (24hrs), Av.8 °F (37.1 °C), Min:98.3 °F (36.8 °C), Max:99.4 °F (37.4 °C)    No results for input(s): POCGLU in the last 72 hours. I/O (24Hr):     Intake/Output Summary (Last 24 hours) at 2022 1750  Last data filed at 2022 0600  Gross per 24 hour   Intake 784.07 ml   Output 1100 ml   Net -315.93 ml       Labs:  Hematology:  Recent Labs     22  0510 22  034   WBC 9.1 10.7   RBC 4.96 4.96   HGB 13.2 13.4   HCT 40.8 41.3   MCV 82.3* 83.3   MCH 26.6 27.0   MCHC 32.4 32.4   RDW 14.1 14.2    316   MPV 9.8 10.1   DDIMER 1.02*  --      Chemistry:  Recent Labs     22  0510 22  0346 22  1113   * 135  --    K 3.9 3.1*  --    CL 94* 93*  --    CO2 23 28  --    GLUCOSE 161* 117*  --    BUN 38* 35*  --    CREATININE 0.98 1.20  --    MG  --  2.1 2.0   ANIONGAP 12 14  --    LABGLOM >60 >60  --    GFRAA >60 >60  --    CALCIUM 8.4* 8.7  --    PROBNP 694*  --   --    No results for input(s): PROT, LABALBU, LABA1C, Z9CODVS, C0LPVIF, FT4, TSH, AST, ALT, LDH, GGT, ALKPHOS, LABGGT, BILITOT, BILIDIR, AMMONIA, AMYLASE, LIPASE, LACTATE, CHOL, HDL, LDLCHOLESTEROL, CHOLHDLRATIO, TRIG, VLDL, KIW56FF, PHENYTOIN, PHENYF, URICACID, POCGLU in the last 72 hours. ABG:  Lab Results   Component Value Date    POCPH 7.414 01/09/2022    POCPCO2 40.9 01/09/2022    POCPO2 52.8 01/09/2022    POCHCO3 26.1 01/09/2022    NBEA NOT REPORTED 01/09/2022    PBEA 1 01/09/2022    AXB4MTH NOT REPORTED 01/09/2022    DBIE7BPW 87 01/09/2022    FIO2 NOT REPORTED 01/09/2022     Lab Results   Component Value Date/Time    SPECIAL NOT REPORTED 03/30/2012 06:02 PM     Lab Results   Component Value Date/Time    CULTURE NO GROWTH 03/30/2012 06:02 PM    CULTURE  03/30/2012 06:02 PM     Performed at [place] [address] [phone]       Radiology:  XR ABDOMEN (KUB) (SINGLE AP VIEW)    Result Date: 1/16/2022  1. Unremarkable bowel gas pattern. US LIVER    Result Date: 1/14/2022  Significantly limited study due to patient difficulty with cooperation during the imaging examination. Possible hepatic steatosis. Gallbladder, pancreas and common duct not visualized. RECOMMENDATIONS: Unavailable     XR CHEST PORTABLE    Result Date: 1/17/2022  Persistent opacities at the lung bases with some clearing of right perihilar airspace disease. XR CHEST PORTABLE    Result Date: 1/15/2022  Bronchial wall thickening with reticulonodular opacities in the right lung the left lung base, similar to prior. This may reflect infectious/inflammatory airways process it is not the typical appearance of atypical viral pneumonitis. XR CHEST PORTABLE    Result Date: 1/14/2022  No significant interval change, when compared to the previous study performed 01/12/2022.        Physical Examination:        General appearance:  alert, cooperative and no distress  Mental Status:  oriented to person, place and time and normal affect  Lungs: Scattered rhonchi bilaterally, normal effort  Heart:  regular rate and rhythm, no murmur  Abdomen:  soft, nontender, nondistended, normal bowel sounds, no masses, hepatomegaly, splenomegaly  Extremities:  no edema, redness, tenderness in the calves  Skin:  no gross lesions, rashes, induration    Assessment:        Hospital Problems           Last Modified POA    * (Principal) Pneumonia due to COVID-19 virus 1/9/2022 Yes    AMS (altered mental status) 1/9/2022 Yes    COPD without exacerbation ([facility] [address] 1/9/2022 Yes    A-fib ([address] 1/9/2022 Yes    Gastroesophageal reflux disease without esophagitis 1/9/2022 Yes    Neuropathy 1/9/2022 Yes    JO (acute kidney injury) ([address] 1/9/2022 Yes    Elevated troponin 1/9/2022 Yes    Encephalopathy due to COVID-19 virus 1/18/2022 Yes    Acute respiratory failure with hypercapnia ([facility] [address] 1/13/2022 Yes          Plan:        1. Discontinue Rocephin  2. Has completed course of Decadron  3. Continue to wean high flow oxygen  4. GI and DVT prophylaxis  5. Avoid nephrotoxic agents, JO resolved  6. BiPAP as tolerated and as needed  7. PT and OT  8. Continue home maintenance medications as appropriate  9.  Transfer to stepdown in the next 24 hours if continues to improve/remain stable    Vasquez Summers DO  1/19/2022  5:50 PM None

## 2022-01-19 NOTE — PROGRESS NOTES
Pulmonary Critical Care Progress Note  Alex Valero MD     Patient seen for the follow up of acute hypoxic respiratory insufficiency, Pneumonia due to COVID-19 virus     Subjective:  He has been on high flow at 90% with 60 L flow. He declined BiPAP. Patient is off Precedex he has been tolerating oral intake. He is sitting up in the bedside chair. Examination:  Vitals: /72   Pulse 115   Temp 98.3 °F (36.8 °C) (Tympanic)   Resp 26   Ht 5' 11\" (1.803 m)   Wt 287 lb 7 oz (130.4 kg)   SpO2 96%   BMI 40.09 kg/m²   General appearance:   Awake follows commands  Neck: No JVD  Lungs:  Decreased breath sounds no crackles or wheezing  Heart: regular rate and rhythm, S1, S2 normal, no gallop  Abdomen: Soft, non tender, + BS  Extremities: no cyanosis or clubbing. No significant edema    LABs:  BMP:   Recent Labs     01/18/22  0510 01/19/22  0346   * 135   K 3.9 3.1*   CO2 23 28   BUN 38* 35*   CREATININE 0.98 1.20   LABGLOM >60 >60   GLUCOSE 161* 117*   Results for Dori Woodall (MRN [id number]) as of 1/16/2022 14:19   Ref.  Range 1/13/2022 08:57 1/14/2022 08:30 1/15/2022 08:45 1/16/2022 04:54   Albumin/Globulin Ratio Latest Ref Range: 1.0 - 2.5  NOT REPORTED NOT REPORTED  NOT REPORTED   Alk Phos Latest Ref Range: 40 - 129 U/L 76 77  71   ALT Latest Ref Range: 5 - 41 U/L 559 (H) 501 (H)  421 (H)   AST Latest Ref Range: <40 U/L 399 (H) 230 (H)  134 (H)   Bilirubin Latest Ref Range: 0.3 - 1.2 mg/dL 1.43 (H) 1.54 (H)  1.07   Bilirubin, Direct Latest Ref Range: <0.31 mg/dL  0.92 (H)  0.51 (H)   Bilirubin, Indirect Latest Ref Range: 0.00 - 1.00 mg/dL  0.62  0.56   Total Protein Latest Ref Range: 6.4 - 8.3 g/dL 7.1 6.9  6.5     ABG:  Lab Results   Component Value Date    BBU9OLN NOT REPORTED 01/09/2022    FIO2 NOT REPORTED 01/09/2022       Lab Results   Component Value Date    POCPH 7.414 01/09/2022    POCPCO2 40.9 01/09/2022    POCPO2 52.8 01/09/2022    POCHCO3 26.1 01/09/2022    NBEA NOT REPORTED 01/09/2022 PBEA 1 01/09/2022    HJP3SKA NOT REPORTED 01/09/2022    IWMP9OSV 87 01/09/2022    FIO2 NOT REPORTED 01/09/2022   Results for Racheal Bodily (MRN [id number]) as of 1/15/2022 18:38   Ref. Range 1/9/2022 04:03 1/10/2022 05:23   D-Dimer, [place] Latest Ref Range: 0.00 - 0.59 mg/L FEU 2.31 (H) 1.59 (H)   Results for Racheal Bodily (MRN [id number]) as of 1/16/2022 14:19   Ref. Range 1/9/2022 04:03 1/10/2022 05:23 1/15/2022 18:57   D-Dimer, [place] Latest Ref Range: 0.00 - 0.59 mg/L FEU 2.31 (H) 1.59 (H) 0.69 (H)    Results for Racheal Bodily (MRN [id number]) as of 1/17/2022 15:39   Ref. Range 1/13/2022 08:57 1/14/2022 08:30 1/15/2022 08:45 1/16/2022 04:54   Albumin Latest Ref Range: 3.5 - 5.2 g/dL 3.5 3.5  3.5   Globulin Latest Ref Range: 1.5 - 3.8 g/dL  NOT REPORTED  NOT REPORTED   Albumin/Globulin Ratio Latest Ref Range: 1.0 - 2.5  NOT REPORTED NOT REPORTED  NOT REPORTED   Alk Phos Latest Ref Range: 40 - 129 U/L 76 77  71   ALT Latest Ref Range: 5 - 41 U/L 559 (H) 501 (H)  421 (H)   AST Latest Ref Range: <40 U/L 399 (H) 230 (H)  134 (H)   Bilirubin Latest Ref Range: 0.3 - 1.2 mg/dL 1.43 (H) 1.54 (H)  1.07   Bilirubin, Direct Latest Ref Range: <0.31 mg/dL  0.92 (H)  0.51 (H)   Bilirubin, Indirect Latest Ref Range: 0.00 - 1.00 mg/dL  0.62  0.56   Total Protein Latest Ref Range: 6.4 - 8.3 g/dL 7.1 6.9  6.5   Results for Espstevie Bodily (MRN [id number]) as of 1/18/2022 14:45   Ref.  Range 1/18/2022 05:10   Pro-BNP Latest Ref Range: <300 pg/mL 694 (H)     Radiology:  X-ray chest 1/17/2022  Persistent opacities at the lung bases with some clearing of right perihilar   airspace disease.         Impression:  · Acute hypoxic respiratory  failure requiring BiPAP and high flow  · COVID-19  Pneumonia  · Altered mental status/encephalopathy  · Acute kidney injury  · Suspected obstructive sleep apnea/Obesity  · A. fib, anxiety, arthritis, GERD  · Elevated D-dimer  · Elevated liver function    Recommendations:  · Oxygen via high flow to keep saturation above 90%  · BiPAP as tolerated  · Precedex drip, as needed  · Incentive spirometry every hour while wake  · DuoNeb by nebulizer 4 times daily  · Pulmicort 0.5 q.12 hours by nebulizer  · Monitor D-dimer   · Prone as tolerated  · Advance diet  · X-ray chest in a.m.   · Decadron 10 mg daily  · Monitor mental status   · Continue [id number] mg twice daily  · Bedside physical therapy  · Discussed with RN  · DVT prophylaxis on heparin 5000 q.8 hours  · We will follow with you    Electronically signed by     Hussain Jcakson MD on 1/19/2022 at 2:12 PM  Pulmonary Critical Care and Sleep Medicine,  [facility]  Cell: [phone]  Office: [phone]

## 2022-01-19 NOTE — PROGRESS NOTES
Patient resting quietly in bed, complaining of chronic pain in neck and back pain, requesting PRN morphine early. Patient expressed that he takes norco and oxycontin pills at home, on call NP updated of lack of pain control with PRN morphine. Awaiting orders/plan of care.

## 2022-01-19 NOTE — PROGRESS NOTES
Section of Cardiology  Progress Note      Date:  1/19/2022  Patient: Adriane Casey  Admission:  1/8/2022  3:33 PM  Admit DX: Acute respiratory failure with hypercapnia (HCC) [J96.02]  Altered mental status, unspecified altered mental status type [R41.82]  AMS (altered mental status) [R41.82]  COVID-19 [U07.1]  Age:  59 y.o., 1960     LOS: 11 days     Reason for evaluation:   arrhythmia      SUBJECTIVE:     The patient was seen and examined. Notes and labs reviewed. There were not complications over night. Information obtained from the patient's nurse. The patient, through the glass door, appeared to be comfortable. He was having his breakfast on his own. He is still on high flow oxygen. According to his nurse he is interactive and has good demeanor. There is definite increase in the PVCs frequencies  However the patient remained asymptomatic. OBJECTIVE:    Telemetry: Sinus and occasional PVCs  /62   Pulse 101   Temp 99.3 °F (37.4 °C) (Oral)   Resp (!) 33   Ht 5' 11\" (1.803 m)   Wt 287 lb 7 oz (130.4 kg)   SpO2 94%   BMI 40.09 kg/m²     Intake/Output Summary (Last 24 hours) at 1/19/2022 1029  Last data filed at 1/19/2022 0600  Gross per 24 hour   Intake 1484.07 ml   Output 2500 ml   Net -1015.93 ml       EXAM:   Physical exam was deferred due to Covid 19 isolation status and to minimize the specimen of the infection.     Current Inpatient Medications:   enoxaparin  30 mg SubCUTAneous BID    famotidine  20 mg Oral BID    QUEtiapine  100 mg Oral BID    cefTRIAXone (ROCEPHIN) IV  1,000 mg IntraVENous Q24H    ipratropium-albuterol  1 ampule Inhalation 4x daily    budesonide  0.5 mg Nebulization BID    gabapentin  100 mg Oral TID    ziprasidone  20 mg IntraMUSCular Once    And    sterile water  1.2 mL IntraMUSCular Once    busPIRone  15 mg Oral Daily    DULoxetine  60 mg Oral Daily    finasteride  5 mg Oral Daily    cetirizine  10 mg Oral Daily    rivastigmine  1 patch TransDERmal Daily    [Held by provider] spironolactone  25 mg Oral BID    sodium chloride flush  5-40 mL IntraVENous 2 times per day    Vitamin D  2,000 Units Oral Daily       IV Infusions (if any):   dexmedetomidine (PRECEDEX) IV infusion Stopped (01/18/22 1210)    sodium chloride         Diagnostics:       Labs:   CBC:   Recent Labs     01/18/22  0510 01/19/22  0346   WBC 9.1 10.7   HGB 13.2 13.4   HCT 40.8 41.3    316     BMP:   Recent Labs     01/18/22  0510 01/19/22  0346   * 135   K 3.9 3.1*   CO2 23 28   BUN 38* 35*   CREATININE 0.98 1.20   LABGLOM >60 >60   GLUCOSE 161* 117*     No results found for: BNP  PT/INR: No results for input(s): PROTIME, INR in the last 72 hours. APTT:No results for input(s): APTT in the last 72 hours. CARDIAC ENZYMES:No results for input(s): CKTOTAL, CKMB, CKMBINDEX, TROPONINT in the last 72 hours. FASTING LIPID PANEL:  Lab Results   Component Value Date    HDL 27 09/26/2020    TRIG 186 09/26/2020     LIVER PROFILE:No results for input(s): AST, ALT, LABALBU in the last 72 hours. ASSESSMENT:  1.  Short nonsustained ventricular tachycardia  2.  History of atrial fibrillation, status post ablation in 2014  3.  Permanent pacemaker  4.  Acute respiratory failure  5.  COVID-19 pneumonia  6.  Acute kidney injury, resolved  7.  Elevated troponin, could be related to acute kidney injury and hypoxemia  8.  Altered mental status  9.  Elevated D-dimer  10.  Elevated cardiac enzymes  11.   Frequent PVCs in singles    Patient Active Problem List   Diagnosis    AMS (altered mental status)    COPD [facility]ation [facility] [address]    A-fib ([facility] [address]    Gastroesophageal reflux disease without esophagitis    Neuropathy    JO (acute kidney injury) ([facility] [address]    Elevated troponin    Pneumonia due to COVID-19 virus    Encephalopathy due to COVID-19 virus    Acute respiratory failure with hypercapnia (HCC)       PLAN:    Start oral Lopressor 25 mg twice daily  Keep potassium above 4 and magnesium above 2 and transfuse as needed    Please see orders. Discussed with  and nursing.     Joleen Bowman MD, MD

## 2022-01-19 NOTE — PROGRESS NOTES
Dieudonne Pendleton patient's wife was called back and updated on patient's status. All questions answered to wife's satisfaction and patient made aware that she called to check in on him.

## 2022-01-20 NOTE — PROGRESS NOTES
Physical Therapy  Facility/Department: [facility] ICU  Daily Treatment Note  NAME: Adonis Costello  : 1960  MRN: [id number]    Date of Service: 2022    Discharge Recommendations:  Patient would benefit from continued therapy after discharge     Pt currently functioning below baseline. Would suggest additional therapy at time of discharge to maximize long term outcomes and prevent re-admission. Please refer to AM-PAC score for current level of function. Assessment   Body structures, Functions, Activity limitations: Decreased functional mobility ; Decreased strength;Decreased safe awareness;Decreased cognition;Decreased endurance;Decreased balance;Decreased posture  Assessment: Patient demo significant improvement from last treatment, mod x 1 for supine to sit and max x 2 to amb 4 feet to chair with RW. Patient continues to demo decreased awareness of deficits, particularly of lateral lean, but receptive to cues and education, but poor retention. Prognosis: Good  Decision Making: High Complexity  PT Education: Disease Specific Education  Patient Education: Patient educated to deep breathing in through nose and out through mouth, educated to postural awareness, and safety with transfers. Patient accepting of education, but poor retention. Barriers to Learning: Cognitive status  REQUIRES PT FOLLOW UP: Yes  Activity Tolerance  Activity Tolerance: Patient limited by endurance; Patient limited by cognitive status     Patient Diagnosis(es): The primary encounter diagnosis was Altered mental status, unspecified altered mental status type. Diagnoses of Acute respiratory failure with hypercapnia ([address] and COVID-19 were also pertinent to this visit.      has a past medical history of Anxiety, Arthritis, Atrial fib/flutter, transient, Bradycardia, COPD (chronic obstructive pulmonary disease) ([address] Dementia ([address] Depression, GERD (gastroesophageal reflux disease), Neuropathy, Parkinson's disease ([address] Pneumonia, and Syncope.   has a past surgical history that includes Breast surgery; Cholecystectomy; back surgery; Cardiac catheterization (2010); Tonsillectomy; Hand surgery (Right); Cardiac pacemaker placement (); ablation of dysrhythmic focus (11-); and transesophageal echocardiogram (11-). Restrictions  Restrictions/Precautions  Restrictions/Precautions: General Precautions,Fall Risk,Up as Tolerated  Required Braces or Orthoses?: No  Position Activity Restriction  Other position/activity restrictions: Up with assist, marisol lord for RN staff, high flow O2, ICU monitors, IV  Subjective   General  Chart Reviewed: Yes  Additional Pertinent Hx: Dementia, 2 liters home 02  Response To Previous Treatment: Patient with no complaints from previous session. Family / Caregiver Present: No  Subjective  Subjective: Patient agreeable for PT treatment. Upon arrival noted patient leaning to the Rt side with pillows proping patient. General Comment  Comments: OK for PT per [facility] RN          Orientation  Orientation  Orientation Level: Oriented to place;Oriented to time;Oriented to person;Oriented to situation (Patient required increased time to answer questions)  Cognition   Cognition  Overall Cognitive Status: Exceptions  Arousal/Alertness: Delayed responses to stimuli  Following Commands: Follows one step commands with repetition  Attention Span: Difficulty dividing attention; Difficulty attending to directions  Memory: Decreased recall of biographical Information;Decreased recall of recent events  Safety Judgement: Decreased awareness of need for assistance;Decreased awareness of need for safety  Problem Solving: Assistance required to generate solutions;Assistance required to implement solutions;Decreased awareness of errors;Assistance required to correct errors made;Assistance required to identify errors made  Insights: Decreased awareness of deficits  Initiation: Requires cues for all  Sequencing: Requires cues for all  Objective   Bed mobility  Rolling to Left: Moderate assistance  Rolling to Right: Moderate assistance  Supine to Sit: Moderate assistance  Comment: Patient required max verbal and manual cues, demo right lateral lean, but responds to cues to correct. Transfers  Sit to Stand: Maximum Assistance;2 Person Assistance  Stand to sit: Maximum Assistance;2 Person Assistance  Bed to Chair: Maximum assistance;2 Person Assistance  Squat Pivot Transfers: 2 Person Assistance  Lateral Transfers: Maximum Assistance;2 Person Assistance  Comment: RW with all transfers, manual and verbal cues to push up from bed and place hands on walker, Right lateral and requires physical assist to correct. Ambulation  Ambulation?: Yes  Ambulation 1  Surface: level tile  Device: Rolling Walker  Other Apparatus: O2 (High Flow)  Assistance: Maximum assistance;2 Person assistance  Quality of Gait: Strong right lateral lean, leaning against therapist to maintain balance, short suffling steps. Gait Deviations: Slow Ayaka; Increased LIZZETTE; Decreased step length;Decreased step height;Shuffles;Decreased head and trunk rotation  Distance: 4 feet bed to chair. Neuromuscular Education  NDT Treatment: Gait ;Sitting;Standing  Neuromuscular Comments: postural control / righting in sitting and standing with manual and verbal cues. Balance  Posture: Poor (kyphotic, right lateral lean)  Sitting - Static: Fair  Sitting - Dynamic: Fair  Standing - Static: Poor;+ (RW)  Standing - Dynamic: Poor;+ (RW)  Comments: Right lateral lean in sitting and standing, decreased awareness of lean. needs cues to correct.   Exercises  Heelslides: 10x with assist  Ankle Pumps: 10      OutComes Score  Balance Score: 0 (01/20/22 1140)  Gait Score: 0 (01/20/22 1140)        Tinetti Total Score: 0 (01/20/22 1140)                                      AM-PAC Score  AM-PAC Inpatient Mobility Raw Score : 11 (01/20/22 1140)  AM-PAC Inpatient T-Scale Score : 33.86 (01/20/22 1140)  Mobility Inpatient CMS 0-100% Score: 72.57 (01/20/22 1140)  Mobility Inpatient CMS G-Code Modifier : CL (01/20/22 1140)          Goals  Short term goals  Time Frame for Short term goals: 14 treatments  Short term goal 1: Independent bed mobility/ Transfers Min A x 1/ monitor HR  Short term goal 2: Patient will amb 20 feet with RW and mod assist x 2  Short term goal 3: Good sitting balance/posture/ Fair + standing balance  Short term goal 4: Patient will be provided with cognitive stimulation/ Tolerate 30 min ther act  Patient Goals   Patient goals : No goals stated    Plan    Plan  Times per week: 1-2x/day,5-6 days/week  Current Treatment Recommendations: Strengthening,Balance Training,Transfer Training,Functional Mobility Training,Cognitive Reorientation,Gait Training,Endurance Training,Neuromuscular Re-education,Safety Education & Training,Patient/Caregiver Education & Training  Safety Devices  Type of devices: All fall risk precautions in place,Patient at risk for falls,Call light within reach,Nurse notified,Sitter present,Left in chair,Chair alarm in place,Gait belt  Restraints  Initially in place: No  Restraints: UE restraints   Co-treatment with OT warranted secondary to decreased safety and independence requiring 2 skilled therapy professionals to address individual discipline's goals. PT addressing pre gait trunk strengthening, weight shifting prior to transfers, transfer training and postural control in sitting.   Therapy Time   Individual Concurrent Group Co-treatment   Time In 0910         Time Out 0948         Minutes [address], [place]

## 2022-01-20 NOTE — PROGRESS NOTES
Occupational Therapy  Facility/Department: [facility] ICU  Daily Treatment Note  NAME: Abhay Trujillo  :   MRN: [id number]    Date of Service: 2022    Discharge Recommendations:  Patient would benefit from continued therapy after discharge    Pt currently functioning below baseline. Would suggest additional therapy at time of discharge to maximize long term outcomes and prevent re-admission. Please refer to AM-PAC score for current level of function. Assessment   Performance deficits / Impairments: Decreased functional mobility ; Decreased ADL status; Decreased strength;Decreased safe awareness;Decreased cognition;Decreased endurance;Decreased sensation;Decreased balance;Decreased posture  Assessment: Pt would benefit from continued skilled OT services to address deficits in areas of functional balance, functional reach, ADL completion, safety awareness, transfers, UE strength and functional mobility, all limiting safe return home to [facility]. Prognosis: Fair  OT Education: OT Role;Plan of Care;Precautions;Transfer Training;Energy Conservation  Patient Education: Breathing ex, benefits of upright posture  REQUIRES OT FOLLOW UP: Yes  Activity Tolerance  Activity Tolerance: Treatment limited secondary to decreased cognition;Patient limited by fatigue;Patient Tolerated treatment well  Safety Devices  Safety Devices in place: Yes  Type of devices: Call light within reach;Nurse notified; Patient at risk for falls; Left in chair;Chair alarm in place; All fall risk precautions in place;Gait belt         Patient Diagnosis(es): The primary encounter diagnosis was Altered mental status, unspecified altered mental status type. Diagnoses of Acute respiratory failure with hypercapnia ([address] and COVID-19 were also pertinent to this visit.       has a past medical history of Anxiety, Arthritis, Atrial fib/flutter, transient, Bradycardia, COPD (chronic obstructive pulmonary disease) ([address] Dementia ([address] Depression, GERD (gastroesophageal reflux disease), Neuropathy, Parkinson's disease ([facility] Pneumonia, and Syncope.   has a past surgical history that includes Breast surgery; Cholecystectomy; back surgery; Cardiac catheterization (2010); Tonsillectomy; Hand surgery (Right); Cardiac pacemaker placement (); ablation of dysrhythmic focus (11-); and transesophageal echocardiogram (11-). Restrictions  Restrictions/Precautions  Restrictions/Precautions: General Precautions,Fall Risk,Up as Tolerated  Required Braces or Orthoses?: No  Position Activity Restriction  Other position/activity restrictions: Up with assist, marisol lord for RN staff, high flow O2, ICU monitors, IV  Subjective   General  Chart Reviewed: Yes  Patient assessed for rehabilitation services?: Yes  Response to previous treatment: Patient with no complaints from previous session  Family / Caregiver Present: No  Subjective  Subjective: \"Yeah I wanna sit up in that chair\"  General Comment  Comments: Pt supine in bed agreeable to co-treat with PT      Orientation  Orientation  Overall Orientation Status: Impaired  Orientation Level: Oriented to person;Disoriented to situation;Disoriented to time;Oriented to place  Objective    ADL  Grooming:  (Declining to wash face seated EOB)  UE Bathing:  (MaxA washing back.)        Balance  Sitting Balance: Minimal assistance  Standing Balance: Maximum assistance (Heavy posterior lean requiring maxA and VC to correct.)  Bed mobility  Supine to Sit: Moderate assistance  Scooting: Moderate assistance  Comment: With max tactile, [place], and VC for hand placement and tech with fair carryover. VC for breathing tech throughout bed mobilty. Transfers  Stand Step Transfers: Maximum assistance;2 Person assistance  Sit to stand: Maximum assistance;2 Person assistance  Stand to sit: Maximum assistance;2 Person assistance  Transfer Comments: Stand from EOB to RW with max tactile and VC for hand placement on bed and RW.  Stand step from EOB to recliner with assist to manage RW and VC for sequencing and initiation                       Cognition  Overall Cognitive Status: Exceptions  Arousal/Alertness: Delayed responses to stimuli  Following Commands: Follows one step commands with repetition  Attention Span: Difficulty attending to directions; Difficulty dividing attention  Memory: Decreased recall of biographical Information;Decreased recall of recent events  Safety Judgement: Decreased awareness of need for assistance;Decreased awareness of need for safety  Problem Solving: Assistance required to generate solutions;Assistance required to implement solutions;Decreased awareness of errors;Assistance required to correct errors made;Assistance required to identify errors made  Insights: Decreased awareness of deficits  Initiation: Requires cues for all  Sequencing: Requires cues for all           Plan   Plan  Times per week: 3-4x/week  Current Treatment Recommendations: Strengthening,Balance Training,Functional Mobility Training,Endurance Training,Safety Education & Training,Self-Care / ADL,Patient/Caregiver Education & Training,Equipment Evaluation, Education, & procurement,Positioning,Cognitive Reorientation,Cognitive/Perceptual Training           AM-PAC Score        AM-PAC Inpatient Daily Activity Raw Score: 9 (01/20/22 1003)  AM-PAC Inpatient ADL T-Scale Score : 25.33 (01/20/22 1003)  ADL Inpatient CMS 0-100% Score: 79.59 (01/20/22 1003)  ADL Inpatient CMS G-Code Modifier : CL (01/20/22 1003)    Goals  Short term goals  Time Frame for Short term goals: by discharge, pt will  Short term goal 1: tolerate reassessment of ADL transfers/mob as tolerated  Short term goal 2: demo min A with simple grooming tasks and self feeding following set up and min cues for initiation/sequencing  Short term goal 3: participate in AROM of UEs for inc strength for mob/ADLs  Short term goal 4: demo mod Ax 1 with bed mob with rails/controls  Patient Goals Patient goals : not stated       Therapy Time   Individual Concurrent Group Co-treatment   Time In 46         Time Out 0133         Minutes 36           RN reports patient is medically stable for therapy treatment this date. Chart reviewed prior to treatment and patient is agreeable for therapy. All lines intact and patient positioned comfortably at end of treatment. All patient needs addressed prior to ending therapy session. Co-treatment with PT warranted secondary to decreased safety and independence requiring 2 skilled therapy professionals to address individual discipline's goals. OT addressing preparation for ADL transfer, sitting balance for increased ADL performance, sitting/activity tolerance, functional reaching, environmental safety/scanning, fall prevention, functional mobility for ADL transfers, ability to sequence and follow directions and functional UE strength.          FERMIN Selby

## 2022-01-20 NOTE — PROGRESS NOTES
Section of Cardiology  Progress Note      Date:  1/20/2022  Patient: Troy Aguilar  Admission:  1/8/2022  3:33 PM  Admit DX: Acute respiratory failure with hypercapnia (HCC) [J96.02]  Altered mental status, unspecified altered mental status type [R41.82]  AMS (altered mental status) [R41.82]  COVID-19 [U07.1]  Age:  59 y.o., 1960     LOS: 12 days     Reason for evaluation:   arrhythmia      SUBJECTIVE:     The patient was seen and examined. Notes and labs reviewed. There were not complications over night. Patient remained in isolation. He is receiving physical therapy. Still on high flow oxygen. According to his nurse the frequency of PVCs decreased after starting him on Lopressor. OBJECTIVE:    Telemetry: Sinus  BP 95/61   Pulse 97   Temp 98.5 °F (36.9 °C) (Oral)   Resp 18   Ht 5' 11\" (1.803 m)   Wt 287 lb 7 oz (130.4 kg)   SpO2 93%   BMI 40.09 kg/m²     Intake/Output Summary (Last 24 hours) at 1/20/2022 0854  Last data filed at 1/20/2022 0600  Gross per 24 hour   Intake --   Output 325 ml   Net -325 ml       EXAM:   Physical exam was deferred due to COVID-19 isolation status and to minimize the spread of the infection.     Current Inpatient Medications:   metoprolol tartrate  25 mg Oral BID    oxyCODONE  60 mg Oral TID    enoxaparin  30 mg SubCUTAneous BID    famotidine  20 mg Oral BID    QUEtiapine  100 mg Oral BID    ipratropium-albuterol  1 ampule Inhalation 4x daily    budesonide  0.5 mg Nebulization BID    gabapentin  100 mg Oral TID    ziprasidone  20 mg IntraMUSCular Once    And    sterile water  1.2 mL IntraMUSCular Once    busPIRone  15 mg Oral Daily    DULoxetine  60 mg Oral Daily    finasteride  5 mg Oral Daily    cetirizine  10 mg Oral Daily    rivastigmine  1 patch TransDERmal Daily    [Held by provider] spironolactone  25 mg Oral BID    sodium chloride flush  5-40 mL IntraVENous 2 times per day    Vitamin D  2,000 Units Oral Daily       IV Infusions (if and nursing.     Roxanne Glaser MD, MD

## 2022-01-20 NOTE — PROGRESS NOTES
Pulmonary Critical Care Progress Note  Eun Lopez MD     Patient seen for the follow up of acute hypoxic respiratory insufficiency, Pneumonia due to COVID-19 virus     Subjective:  He has been on high flow at 90% with 60 L flow. He used BiPAP last night. He is sitting up in a bedside recliner. He denies any chest pain. He has occasional cough. His shortness of breath is not much change. He has poor appetite. Examination:  Vitals: BP 95/61   Pulse 97   Temp 98.5 °F (36.9 °C) (Oral)   Resp 18   Ht 5' 11\" (1.803 m)   Wt 287 lb 7 oz (130.4 kg)   SpO2 93%   BMI 40.09 kg/m²   General appearance:   Awake follows commands  Neck: No JVD  Lungs:  Decreased breath sounds no crackles or wheezing  Heart: regular rate and rhythm, S1, S2 normal, no gallop  Abdomen: Soft, non tender, + BS  Extremities: no cyanosis or clubbing.  No significant edema    LABs:  BMP:   Recent Labs     01/19/22  0346 01/20/22  0402    135   K 3.1* 3.4*   CO2 28 27   BUN 35* 28*   CREATININE 1.20 1.13   LABGLOM >60 >60   GLUCOSE 117* 100*     ABG:  Lab Results   Component Value Date    PEF7CZF NOT REPORTED 01/09/2022    FIO2 NOT REPORTED 01/09/2022       Lab Results   Component Value Date    POCPH 7.414 01/09/2022    POCPCO2 40.9 01/09/2022    POCPO2 52.8 01/09/2022    POCHCO3 26.1 01/09/2022    NBEA NOT REPORTED 01/09/2022    PBEA 1 01/09/2022    IJJ2QKB NOT REPORTED 01/09/2022    QZFA9PLD 87 01/09/2022    FIO2 NOT REPORTED 01/09/2022       Radiology:  X-ray chest: 1/20/2022  Bibasilar infiltrate      Impression:  · Acute hypoxic respiratory  failure requiring BiPAP and high flow  · COVID-19  Pneumonia  · Altered mental status/encephalopathy  · Acute kidney injury  · Suspected obstructive sleep apnea/Obesity  · A. fib, anxiety, arthritis, GERD  · Elevated D-dimer  · Elevated liver function    Recommendations:  · Oxygen via high flow to keep saturation above 90%  · BiPAP while asleep and as needed  · Precedex drip, as needed  · Incentive spirometry every hour while wake  · DuoNeb by nebulizer 4 times daily  · Pulmicort 0.5 q.12 hours by nebulizer  · Monitor D-dimer   · Prone as tolerated  · Advance diet  · X-ray chest in a.m.   · Decadron 10 mg daily  · Monitor mental status   · Continue Seroquel 100 mg twice daily  · Bedside physical therapy  · Discussed with RN  · DVT prophylaxis on heparin 5000 q.8 hours  · We will follow with you    Electronically signed by     Ct Platt MD on 1/20/2022 at 9:39 AM  Pulmonary Critical Care and Sleep Medicine,  [facility]  Cell: [phone]  Office: [phone]

## 2022-01-20 NOTE — PROGRESS NOTES
Nutrition Assessment     Type and Reason for Visit: Reassess    Nutrition Recommendations/Plan:   1. Continue Regular diet  2. Start Ensure High Protein 2x/day  3. Monitor p.o intakes and labs     Nutrition Assessment:  Patient is able to eat and has been on high flow oxygen during the day and BiPap at night. Will continue Regular diet. Start Ensure High Protein 2x/day. Malnutrition Assessment:  Malnutrition Status: At risk for malnutrition (Comment)    Estimated Daily Nutrient Needs:  Energy (kcal): 7413-2627 kcal (11-14 kcal/kg); Weight Used for Energy Requirements:  Current     Protein (g): 102-109 gm (1.3-1.4 gm/kg); Weight Used for Protein Requirements:  Ideal          Nutrition Related Findings: Edema: +2 pitting BUE. Active bowel sounds. High flow oxygen      Current Nutrition Therapies:    ADULT DIET;  Regular    Anthropometric Measures:  · Height: 5' 11\" (180.3 cm)  · Current Body Wt: 287 lb (130.2 kg)   · BMI: 40    Nutrition Diagnosis:   · Inadequate protein-energy intake related to inadequate protein-energy intake,impaired respiratory function as evidenced by NPO or clear liquid status due to medical condition      Nutrition Interventions:   Food and/or Nutrient Delivery:  Continue Current Diet,Start Oral Nutrition Supplement  Nutrition Education/Counseling:  Education not indicated   Coordination of Nutrition Care:  Continue to monitor while inpatient    Goals:  PO intakes are greater than 75% at meals       Nutrition Monitoring and Evaluation:   Food/Nutrient Intake Outcomes:  Supplement Intake,Food and Nutrient Intake  Physical Signs/Symptoms Outcomes:  Biochemical Data,Fluid Status or Edema,Skin,Weight     Discharge Planning:    Continue current diet         Mery NEVILLEN, RDN, LDN  Lead Clinical Dietitian  RD Office Phone [phone]

## 2022-01-20 NOTE — PROGRESS NOTES
[facility]  Office: [address], DO, Rayne Toth, DO, Rivas Bosch, DO, Kishorjaky Avinacarmen Schafer, DO, Karen Aparicio MD, Mariano Jose MD, Delilah Lopez MD, Shannan Herman MD, Linnea Xiong MD, Ashley Lamar MD, Shahid Mitchell MD, Duane Clifton, DO, Grayson Gatica DO, Russell Li MD,  Ivan Kelly DO, Mahi Jarrett MD, Lorenzo Morse MD, Kaila Mcdowell MD, Nick Pena MD, Niki Lopez MD, Nataliya Park MD, Ruben Castillo MD, Rufus [facility], CNP, Meryl Dao, CNP, Shahid Laird, CNS, Barney Alvarez, CNP, Jose Neumann, CNP, Ailyn Andrews, CNP, Roxy Cohen, CNP, Fiona Gould, CNP, Mary Arango PA-C, Agustina Biswas, [facility], Anayeli Zimmerman, [facility], Luis A Jay, CNP, Valentin Cadena, CNP, Gilberto Pa, CNP, Love Dominguez, CNP, Yamini Gomez, [facility], [facility]    Progress Note    1/20/2022    11:23 AM    Name:   Jorge L Smith  MRN:     [id number]     June Gip:      [de-identified]   Room:   [address] [place] [address] Day:  12  Admit Date:  1/8/2022  3:33 PM    PCP:   Megha Avitia MD  Code Status:  Full Code    Subjective:     C/C:   Chief Complaint   Patient presents with    Altered Mental Status     Interval History Status: improved. Patient sitting up in a chair and denies any complaints of chest pain, shortness of breath, nausea or vomiting, fevers or chills. Has intermittent cough but denies any other acute complaints.   Continues to require high flow oxygen but weaned down to 75%    Brief History:     Per my BRYAN:  \"Shreyas Mcadasm is a 59 y.o. Non- / non  male who presents with Altered Mental Status   and is admitted to the hospital for the management of COVID-19.     Since to the emergency room with altered mental status.  Is confused and not able to provide any history for me.  According to the ER nurse he was brought in by his wife after a fall at home. Shreyas Whelan states that he has been altered as well.  No additional information available.  He was found to have mild respiratory acidosis and started on BiPAP.  Covid is positive.  He also had JO with BUN of 23 and creatinine of 3.09.  Previous kidney function has been normal.\"    Review of Systems:     Constitutional:  negative for chills, fevers, sweats  Respiratory:  negative for dyspnea on exertion, shortness of breath, wheezing  Cardiovascular:  negative for chest pain, chest pressure/discomfort, lower extremity edema, palpitations  Gastrointestinal:  negative for abdominal pain, constipation, diarrhea, nausea, vomiting  Neurological:  negative for dizziness, headache    Medications: Allergies:     Allergies   Allergen Reactions    Latex     Bee Venom     Prednisone Other (See Comments)     Mood swings       Current Meds:   Scheduled Meds:    metoprolol tartrate  25 mg Oral BID    oxyCODONE  60 mg Oral TID    enoxaparin  30 mg SubCUTAneous BID    famotidine  20 mg Oral BID    QUEtiapine  100 mg Oral BID    ipratropium-albuterol  1 ampule Inhalation 4x daily    budesonide  0.5 mg Nebulization BID    gabapentin  100 mg Oral TID    ziprasidone  20 mg IntraMUSCular Once    And    sterile water  1.2 mL IntraMUSCular Once    busPIRone  15 mg Oral Daily    DULoxetine  60 mg Oral Daily    finasteride  5 mg Oral Daily    cetirizine  10 mg Oral Daily    rivastigmine  1 patch TransDERmal Daily    [Held by provider] spironolactone  25 mg Oral BID    sodium chloride flush  5-40 mL IntraVENous 2 times per day    Vitamin D  2,000 Units Oral Daily     Continuous Infusions:    dexmedetomidine (PRECEDEX) IV infusion Stopped (01/18/22 1210)    sodium chloride       PRN Meds: HYDROcodone-acetaminophen, aluminum & magnesium hydroxide-simethicone, potassium chloride **OR** potassium alternative oral replacement **OR** potassium chloride, albuterol, metoprolol, LORazepam **OR** LORazepam, sodium chloride flush, sodium chloride, ondansetron **OR** ondansetron, magnesium hydroxide, acetaminophen **OR** acetaminophen, guaiFENesin-dextromethorphan    Data:     Past Medical History:   has a past medical history of Anxiety, Arthritis, Atrial fib/flutter, transient, Bradycardia, COPD (chronic obstructive pulmonary disease) ([facility] [address] Dementia ([facility] Depression, GERD (gastroesophageal reflux disease), Neuropathy, Parkinson's disease ([facility] Pneumonia, and Syncope. Social History:   reports that he has quit smoking. His smoking use included cigarettes. He has never used smokeless tobacco. He reports that he does not drink alcohol and does not use drugs. Family History:   Family History   Family history unknown: Yes       Vitals:  BP 98/65   Pulse 97   Temp 98.5 °F (36.9 °C) (Oral)   Resp 20   Ht 5' 11\" (1.803 m)   Wt 287 lb 7 oz (130.4 kg)   SpO2 93%   BMI 40.09 kg/m²   Temp (24hrs), Av °F (37.2 °C), Min:98.2 °F (36.8 °C), Max:101.7 °F (38.7 °C)    No results for input(s): POCGLU in the last 72 hours. I/O (24Hr):     Intake/Output Summary (Last 24 hours) at 2022 1123  Last data filed at 2022 0600  Gross per 24 hour   Intake --   Output 325 ml   Net -325 ml       Labs:  Hematology:  Recent Labs     22  0510 22  0346 22  0402   WBC 9.1 10.7 9.4   RBC 4.96 4.96 4.91   HGB 13.2 13.4 13.1   HCT 40.8 41.3 41.1   MCV 82.3* 83.3 83.7   MCH 26.6 27.0 26.7   MCHC 32.4 32.4 31.9   RDW 14.1 14.2 14.2    316 356   MPV 9.8 10.1 9.3   DDIMER 1.02*  --   --      Chemistry:  Recent Labs     22  0510 22  0346 22  1113 22  0402   * 135  --  135   K 3.9 3.1*  --  3.4*   CL 94* 93*  --  96*   CO2 23 28  --  27   GLUCOSE 161* 117*  --  100*   BUN 38* 35*  --  28*   CREATININE 0.98 1.20  --  1.13   MG  --  2.1 2.0 2.2   ANIONGAP 12 14  --  12   LABGLOM >60 >60  --  >60   GFRAA >60 >60  --  >60   CALCIUM 8.4* 8.7  --  8.7   PROBNP 694*  --   --   --    No results for input(s): PROT, LABALBU, LABA1C, H2OFDQK, K2CERVC, FT4, TSH, AST, ALT, LDH, GGT, ALKPHOS, LABGGT, BILITOT, BILIDIR, AMMONIA, AMYLASE, LIPASE, LACTATE, CHOL, HDL, LDLCHOLESTEROL, CHOLHDLRATIO, TRIG, VLDL, LWA18ZR, PHENYTOIN, PHENYF, URICACID, POCGLU in the last 72 hours. ABG:  Lab Results   Component Value Date    POCPH 7.414 01/09/2022    POCPCO2 40.9 01/09/2022    POCPO2 52.8 01/09/2022    POCHCO3 26.1 01/09/2022    NBEA NOT REPORTED 01/09/2022    PBEA 1 01/09/2022    EMU2WGR NOT REPORTED 01/09/2022    PTYO0FCX 87 01/09/2022    FIO2 NOT REPORTED 01/09/2022     Lab Results   Component Value Date/Time    SPECIAL NOT REPORTED 03/30/2012 06:02 PM     Lab Results   Component Value Date/Time    CULTURE NO GROWTH 03/30/2012 06:02 PM    CULTURE  03/30/2012 06:02 PM     Performed at [place] [address] [phone]       Radiology:  XR ABDOMEN (KUB) (SINGLE AP VIEW)    Result Date: 1/16/2022  1. Unremarkable bowel gas pattern. US LIVER    Result Date: 1/14/2022  Significantly limited study due to patient difficulty with cooperation during the imaging examination. Possible hepatic steatosis. Gallbladder, pancreas and common duct not visualized. RECOMMENDATIONS: Unavailable     XR CHEST PORTABLE    Result Date: 1/20/2022  Bibasilar atelectasis with probable right lower lobe infiltrate. Small effusions right-sided PICC tip position stable. XR CHEST PORTABLE    Result Date: 1/17/2022  Persistent opacities at the lung bases with some clearing of right perihilar airspace disease. XR CHEST PORTABLE    Result Date: 1/15/2022  Bronchial wall thickening with reticulonodular opacities in the right lung the left lung base, similar to prior. This may reflect infectious/inflammatory airways process it is not the typical appearance of atypical viral pneumonitis. XR CHEST PORTABLE    Result Date: 1/14/2022  No significant interval change, when compared to the previous study performed 01/12/2022.        Physical Examination:        General appearance:  alert, cooperative and no distress  Mental Status:  oriented to person, place and time and normal affect  Lungs:  clear to auscultation bilaterally, normal effort  Heart:  regular rate and rhythm, no murmur  Abdomen:  soft, nontender, nondistended, normal bowel sounds, no masses, hepatomegaly, splenomegaly  Extremities:  no edema, redness, tenderness in the calves  Skin:  no gross lesions, rashes, induration    Assessment:        Hospital Problems           Last Modified POA    * (Principal) Pneumonia due to COVID-19 virus 1/9/2022 Yes    AMS (altered mental status) 1/9/2022 Yes    COPD without exacerbation ([address] 1/9/2022 Yes    A-fib ([address] 1/9/2022 Yes    Gastroesophageal reflux disease without esophagitis 1/9/2022 Yes    Neuropathy 1/9/2022 Yes    JO (acute kidney injury) ([address] 1/9/2022 Yes    Elevated troponin 1/9/2022 Yes    Encephalopathy due to COVID-19 virus 1/18/2022 Yes    Acute respiratory failure with hypercapnia ([address] 1/13/2022 Yes          Plan:        1. Continue to wean FiO2 as tolerated  2. Decadron completed, monitor progress  3. GI and DVT prophylaxis  4. Monitor renal function, avoid nephrotoxic agents  5. BiPAP as tolerated and as needed  6. Increase activity, PT and OT  7. Supplement potassium and magnesium per scale  8. Continue home medications as ordered  9. Pulmonary evaluation progress  10. Cardiology evaluation progress  11.  Transfer to stepdown    Leonel Vargas DO  1/20/2022  11:23 AM

## 2022-01-20 NOTE — CARE COORDINATION
DISCHARGE PLAN:    Patient is currently on HF 60L/75%. Spoke with amy and they have not started precert yet. Patient HF is still to high. Patient has [employer] insurance. May be same day precert. Amy to follow.

## 2022-01-21 NOTE — PROGRESS NOTES
Updated Dr. Alfredo Perkins of 7 beat run of [facility] at  this am via person. Per Dr. Alfredo Perkins, give po 20 meq potassium x1 now.

## 2022-01-21 NOTE — PROGRESS NOTES
Patient transferred to room 1001. All belongings brought with patient, telemetry applied, call light within reach. Patient oriented to room and voiced understanding to call out when needing assistance.

## 2022-01-21 NOTE — PROGRESS NOTES
Patient requested to be switched from BIPAP to HHF. Once on HHF, patient desatted into the 62s. Patient placed back on BIPAP and SPO2 increased to 93%. RT down to assess. Dr. Maikol Xavier notified via [employer]. No new orders at this time. Will provide support and education as needed.

## 2022-01-21 NOTE — PROGRESS NOTES
NUTRITION NOTE    RN reports patient is not eating much likely due to poor dentition. Diet downgraded to Dysphagia minced and moist, requested Magic Cup 2x daily to aid oral intakes. Magic Cup added 2x daily.

## 2022-01-21 NOTE — PLAN OF CARE
Problem: Pain:  Goal: Pain level will decrease  Description: Pain level will decrease  Outcome: Ongoing     Problem: Pain:  Goal: Control of acute pain  Description: Control of acute pain  Outcome: Ongoing     Problem: Pain:  Goal: Control of chronic pain  Description: Control of chronic pain  Outcome: Ongoing  Note: Pain medication given on schedule     Problem: Skin Integrity:  Goal: Will show no infection signs and symptoms  Description: Will show no infection signs and symptoms  Outcome: Ongoing     Problem: Skin Integrity:  Goal: Absence of new skin breakdown  Description: Absence of new skin breakdown  Outcome: Ongoing     Problem: Falls - Risk of:  Goal: Will remain free from falls  Description: Will remain free from falls  Outcome: Ongoing     Problem: Falls - Risk of:  Goal: Absence of physical injury  Description: Absence of physical injury  Outcome: Ongoing     Problem: Airway Clearance - Ineffective  Goal: Achieve or maintain patent airway  Outcome: Ongoing     Problem: Gas Exchange - Impaired  Goal: Absence of hypoxia  Outcome: Ongoing     Problem: Gas Exchange - Impaired  Goal: Promote optimal lung function  Outcome: Ongoing     Problem: Breathing Pattern - Ineffective  Goal: Ability to achieve and maintain a regular respiratory rate  Outcome: Ongoing     Problem: Body Temperature -  Risk of, Imbalanced  Goal: Ability to maintain a body temperature within defined limits  Outcome: Ongoing     Problem: Body Temperature -  Risk of, Imbalanced  Goal: Will regain or maintain usual level of consciousness  Outcome: Ongoing     Problem:  Body Temperature -  Risk of, Imbalanced  Goal: Complications related to the disease process, condition or treatment will be avoided or minimized  Outcome: Ongoing     Problem: Isolation Precautions - Risk of Spread of Infection  Goal: Prevent transmission of infection  Outcome: Ongoing     Problem: Risk for Fluid Volume Deficit  Goal: Maintain normal heart rhythm  Outcome: of improved sensory functioning will increase  Description: Demonstrations of improved sensory functioning will increase  Outcome: Ongoing     Problem: Sensory Perception - Impaired:  Goal: Decrease in sensory misperception frequency  Description: Decrease in sensory misperception frequency  Outcome: Ongoing     Problem: Sensory Perception - Impaired:  Goal: Able to refrain from responding to false sensory perceptions  Description: Able to refrain from responding to false sensory perceptions  Outcome: Ongoing     Problem: Sensory Perception - Impaired:  Goal: Demonstrates accurate environmental perceptions  Description: Demonstrates accurate environmental perceptions  Outcome: Ongoing     Problem: Sensory Perception - Impaired:  Goal: Able to distinguish between reality-based and nonreality-based thinking  Description: Able to distinguish between reality-based and nonreality-based thinking  Outcome: Ongoing     Problem: Sensory Perception - Impaired:  Goal: Able to interrupt nonreality-based thinking  Description: Able to interrupt nonreality-based thinking  Outcome: Ongoing     Problem: Sleep Pattern Disturbance:  Goal: Appears well-rested  Description: Appears well-rested  Outcome: Ongoing     Problem: SKIN INTEGRITY  Goal: LTG - patient will maintain/improve skin integrity through proper skin care techniques  Outcome: Ongoing     Problem: Nutrition  Goal: Optimal nutrition therapy  Outcome: Ongoing

## 2022-01-21 NOTE — PROGRESS NOTES
Occupational Therapy  Facility/Department: [facility] PROGRESSIVE CARE  Daily Treatment Note  NAME: Adriane Casey  : [id number]  MRN: [id number]    RN Paul Uribe reports patient is medically stable for therapy treatment this date. Chart reviewed prior to treatment and patient is agreeable for therapy. All lines intact and patient positioned comfortably at end of treatment. All patient needs addressed prior to ending therapy session. Date of Service: 2022    Discharge Recommendations:  Patient would benefit from continued therapy after discharge    Pt currently functioning below baseline. Would suggest additional therapy at time of discharge to maximize long term outcomes and prevent re-admission. Please refer to AM-PAC score for current level of function. Assessment   Performance deficits / Impairments: Decreased functional mobility ; Decreased ADL status; Decreased strength;Decreased safe awareness;Decreased cognition;Decreased endurance;Decreased sensation;Decreased balance;Decreased posture  Assessment: Pt would benefit from continued skilled OT Services to increase I and safety during functional tasks to reduce caregiver burden as able. Prognosis: Fair  OT Education: OT Role;Plan of Care;Precautions; ADL Adaptive Strategies;Transfer Training;Energy Conservation  Patient Education: participation in therapy, pursed lip breathing, cognitive reorientation, pressure relief, proper bed mobility, OT POC, recommendations for continued thearpy  Barriers to Learning: cognition  REQUIRES OT FOLLOW UP: Yes  Activity Tolerance  Activity Tolerance: Treatment limited secondary to decreased cognition;Patient limited by fatigue;Patient limited by pain  Activity Tolerance: poor  Safety Devices  Safety Devices in place: Yes  Type of devices: Call light within reach;Nurse notified; Patient at risk for falls; All fall risk precautions in place;Gait belt;Left in bed;Bed alarm in place         Patient Diagnosis(es): The primary encounter diagnosis was Altered mental status, unspecified altered mental status type. Diagnoses of Acute respiratory failure with hypercapnia ([facility] and COVID-19 were also pertinent to this visit. has a past medical history of Anxiety, Arthritis, Atrial fib/flutter, transient, Bradycardia, COPD (chronic obstructive pulmonary disease) ([facility] [address] Dementia ([facility] [address] Depression, GERD (gastroesophageal reflux disease), Neuropathy, Parkinson's disease ([facility] [address] Pneumonia, and Syncope.   has a past surgical history that includes Breast surgery; Cholecystectomy; back surgery; Cardiac catheterization (2010); Tonsillectomy; Hand surgery (Right); Cardiac pacemaker placement (); ablation of dysrhythmic focus (11-); and transesophageal echocardiogram (11-). Restrictions  Restrictions/Precautions  Restrictions/Precautions: General Precautions,Fall Risk,Up as Tolerated  Required Braces or Orthoses?: No  Position Activity Restriction  Other position/activity restrictions: Up with assist, Yue lord for RN staff, high flow O2, ICU monitors, R UE IV       Subjective   General  Chart Reviewed: Yes  Patient assessed for rehabilitation services?: Yes  Response to previous treatment: Patient with no complaints from previous session  Family / Caregiver Present: No  Subjective  Subjective: \"what are you guys here for\"  General Comment  Comments: Pt resting in bed, agreeable to OT co-tx with PT. Pt appears somewhat lethargic requiring cues to keep eyes open throughout session. Pre Treatment Pain Screening  Comments / Details: Pt reporting pain in back with movements.   Vital Signs  Patient Currently in Pain: Yes       Objective    ADL  Grooming: Setup;Maximum assistance (to wipe face after spitting out ensure)  UE Dressing: Maximum assistance;Setup (for doffing soiled gown and donning clean gown while supine in bed)  LE Dressing: Dependent/Total (for donning B socks while supine in bed)  Toileting: Dependent/Total (pt was dep required staff memeber to hold urinal while urinated supine in bed)        Balance  Sitting Balance: Maximum assistance (for upright posture sitting on EOB)  Standing Balance: Unable to assess(comment) (Pt not appropriate at this time.)  Standing Balance  Time: sitting on EOB < 1 min  Activity: pt sat on EOB ~ 1 min then abruptly announced that he needed lay down to use the restroom. Pt assist back into bed. Functional Mobility  Functional Mobility Comments: Not appriopriate at this time  Bed mobility  Rolling to Left: Maximum assistance;2 Person assistance  Rolling to Right: Maximum assistance;2 Person assistance  Supine to Sit: Maximum assistance;2 Person assistance  Sit to Supine: Maximum assistance;2 Person assistance  Scooting: Maximal assistance;2 Person assistance  Comment: Pt completed multiple rolls  L <> R for linen change, pt required MAX verbal cues/hand over hand assist for proper bed mobility, pursed lip breathing, hand placement on bedrails, pacing self all to increase ease/safety. Cognition  Overall Cognitive Status: Exceptions  Arousal/Alertness: Delayed responses to stimuli  Following Commands: Follows one step commands with repetition  Attention Span: Difficulty dividing attention; Difficulty attending to directions  Memory: Decreased recall of biographical Information;Decreased recall of recent events  Safety Judgement: Decreased awareness of need for assistance;Decreased awareness of need for safety  Problem Solving: Assistance required to generate solutions;Assistance required to implement solutions;Decreased awareness of errors;Assistance required to correct errors made;Assistance required to identify errors made  Insights: Decreased awareness of deficits  Initiation: Requires cues for all  Sequencing: Requires cues for all  Cognition Comment: Pt very lethargic this date.                        Plan   Plan  Times per week: 3-4x/week  Current Treatment Recommendations: Strengthening,Balance Training,Functional Mobility Training,Endurance Training,Safety Education & Training,Self-Care / ADL,Patient/Caregiver Education & Training,Equipment Evaluation, Education, & procurement,Positioning,Cognitive Reorientation,Cognitive/Perceptual Training           AM-PAC Score: 9             Goals  Short term goals  Time Frame for Short term goals: by discharge, pt will  Short term goal 1: tolerate reassessment of ADL transfers/mob as tolerated  Short term goal 2: demo min A with simple grooming tasks and self feeding following set up and min cues for initiation/sequencing  Short term goal 3: participate in AROM of UEs for inc strength for mob/ADLs  Short term goal 4: demo mod Ax 1 with bed mob with rails/controls  Patient Goals   Patient goals : not stated       Therapy Time   Individual Concurrent Group Co-treatment   Time In 1345         Time Out 1408         Minutes [address] [facility], OT

## 2022-01-21 NOTE — PROGRESS NOTES
Pulmonary Critical Care Progress Note  Dany Sidhu CNP/Alejandro Diana MD     Patient seen for the follow up of acute hypoxic respiratory insufficiency, Pneumonia due to COVID-19 virus     Subjective:  He has been transferred out of intensive care unit. He used BiPAP overnight. He remains on high flow, currently on 85% / 60 L. He does not have chest pain. His shortness of breath is not much changed. He has occasional cough, mostly dry. Appetite is poor. Examination:  Vitals: /66   Pulse 105   Temp 101.7 °F (38.7 °C) (Axillary)   Resp 19   Ht 5' 11\" (1.803 m)   Wt 261 lb 14.5 oz (118.8 kg)   SpO2 98%   BMI 36.53 kg/m²   General appearance:   Awake follows commands, slow to respond  Neck: No JVD  Lungs:  Decreased breath sounds no crackles or wheezing  Heart: regular rate and rhythm, S1, S2 normal, no gallop  Abdomen: Soft, non tender, + BS  Extremities: no cyanosis or clubbing.  No significant edema    LABs:  BMP:   Recent Labs     22  0402 22  0743    134*   K 3.4* 3.9   CO2 27 25   BUN 28* 31*   CREATININE 1.13 1.19   LABGLOM >60 >60   GLUCOSE 100* 105*     ABG:  Lab Results   Component Value Date    QOQ9YPL NOT REPORTED 2022    FIO2 NOT REPORTED 2022       Lab Results   Component Value Date    POCPH 7.414 2022    POCPCO2 40.9 2022    POCPO2 52.8 2022    POCHCO3 26.1 2022    NBEA NOT REPORTED 2022    PBEA 1 2022    QNZ5THH NOT REPORTED 2022    WODG7SXJ 87 2022    FIO2 NOT REPORTED 2022       Radiology:  X-ray chest: 2022  Bibasilar infiltrate      Impression:  · Acute hypoxic respiratory  failure requiring BiPAP and high flow  · COVID-19  Pneumonia  · Altered mental status/encephalopathy  · Acute kidney injury  · Suspected obstructive sleep apnea/Obesity  · A. fib, anxiety, arthritis, GERD  · Elevated D-dimer  · Elevated liver function    Recommendations:  · Oxygen via high flow to keep saturation above 90%  · BiPAP while asleep and as needed  · Incentive spirometry every hour while wake  · DuoNeb by nebulizer 4 times daily  · Pulmicort 0.5 q.12 hours by nebulizer  · Diet as tolerated, encourage oral intake  · Monitor D-dimer   · Prone as tolerated  · Status post course of Decadron   · Continue Seroquel 100 mg twice daily/monitor mental status  · PT/OT  · Discussed with RN  · DVT prophylaxis on heparin 5000 q.8 hours  · Prophylaxis, Pepcid  · We will follow with you    Electronically signed by     PATRICK Montejo CNP on 1/21/2022 at 9:54 AM  Pulmonary Critical Care and Sleep Medicine,  [facility] AT [place]: [phone]

## 2022-01-21 NOTE — PLAN OF CARE
Problem: Pain:  Goal: Pain level will decrease  Description: Pain level will decrease  1/21/2022 1326 by Thomas Grier RN  Outcome: Ongoing   Pt rated elevated pain this am. Pt currently resting. Will continue to monitor. Problem: Skin Integrity:  Goal: Will show no infection signs and symptoms  Description: Will show no infection signs and symptoms  1/21/2022 1326 by Thomas Grier RN  Outcome: Ongoing  Pt did have a fever this am, since resolved. Notified Dr. Rivas Roth in person of this . Monitoring. Problem: Falls - Risk of:  Goal: Will remain free from falls  Description: Will remain free from falls  1/21/2022 1326 by Thomas Grier RN  Outcome: Ongoing   No falls noted this shift. Problem: Airway Clearance - Ineffective  Goal: Achieve or maintain patent airway  1/21/2022 1326 by Thomas Grier RN  Outcome: Ongoing   Pt continues on high flow via heated NC. Will continue to monitor. Problem: Gas Exchange - Impaired  Goal: Absence of hypoxia  1/21/2022 1326 by Thomas Grier RN  Outcome: Ongoing   Monitoring. Problem: Body Temperature -  Risk of, Imbalanced  Goal: Ability to maintain a body temperature within defined limits  1/21/2022 1326 by Thomas Grier RN  Outcome: Ongoing   Pt continues to have intermittent fevers. Resolved since this am. Will continue to monitor. Physician made aware of fever this am.  Problem: Risk for Fluid Volume Deficit  Goal: Maintain normal heart rhythm  1/21/2022 1326 by Thomas Grier RN  Outcome: Ongoing   Dr. Kandace Pradhan made aware of 7 beat run of vtach from this am. See note. Problem: Fatigue  Goal: Verbalize increase energy and improved vitality  1/21/2022 1326 by Thomas Grier RN  Outcome: Ongoing   Encouraging movement this shift. Problem: Patient Education: Go to Patient Education Activity  Goal: Patient/Family Education  1/21/2022 1326 by Thomas Grier RN  Outcome: Ongoing   Pt verbalized needs this shift. Encouraging po intake this shift.    Problem: Discharge Planning:  Goal: Ability to perform activities of daily living will improve  Description: Ability to perform activities of daily living will improve  Outcome: Ongoing   Pt to discharge to facility when safe to do so. Problem: Mood - Altered:  Goal: Mood stable  Description: Mood stable  1/21/2022 1326 by Mayra Madsen RN  Outcome: Ongoing   Pt less aggressive this shift. Pt very lethargic. Primary aware. Problem: Sensory Perception - Impaired:  Goal: Demonstrations of improved sensory functioning will increase  Description: Demonstrations of improved sensory functioning will increase  1/21/2022 1326 by Mayra Madsen RN  Outcome: Ongoing  Monitoring. Problem: Nutrition  Goal: Optimal nutrition therapy  1/21/2022 1326 by Mayra Madsen RN  Outcome: Ongoing   Pt has had poor intake- pt has no teeth. Notified physician to see if a downgraded diet may improve oral intake.

## 2022-01-21 NOTE — PROGRESS NOTES
Physical Therapy  Facility/Department: [facility] PROGRESSIVE CARE  Daily Treatment Note  NAME: Adriane Casey  : 1960  MRN: [id number]    Date of Service: 2022    Discharge Recommendations:  Patient would benefit from continued therapy after discharge     Pt currently functioning below baseline. Would suggest additional therapy at time of discharge to maximize long term outcomes and prevent re-admission. Please refer to AM-PAC score for current level of function. Assessment   Body structures, Functions, Activity limitations: Decreased functional mobility ; Decreased strength;Decreased safe awareness;Decreased cognition;Decreased endurance;Decreased balance;Decreased posture  Assessment: Patient demo functional decline compared to last visit, max x 2 for all bed mobility. Patient will benefit from skilled PT to improve bed mobility and transfers. Prognosis: Good  Decision Making: High Complexity  Patient Education: Patient educated on bed mobility techniques, poor retention of education. Barriers to Learning: Cognitive status  REQUIRES PT FOLLOW UP: Yes  Activity Tolerance  Activity Tolerance: Patient limited by endurance; Patient limited by cognitive status     Patient Diagnosis(es): The primary encounter diagnosis was Altered mental status, unspecified altered mental status type. Diagnoses of Acute respiratory failure with hypercapnia ([address] and COVID-19 were also pertinent to this visit. has a past medical history of Anxiety, Arthritis, Atrial fib/flutter, transient, Bradycardia, COPD (chronic obstructive pulmonary disease) ([address] Dementia ([address] Depression, GERD (gastroesophageal reflux disease), Neuropathy, Parkinson's disease ([address] Pneumonia, and Syncope.   has a past surgical history that includes Breast surgery; Cholecystectomy; back surgery; Cardiac catheterization (); Tonsillectomy;  Hand surgery (Right); Cardiac pacemaker placement (); ablation of dysrhythmic focus (2014); and transesophageal echocardiogram (11-). Restrictions  Restrictions/Precautions  Restrictions/Precautions: General Precautions,Fall Risk,Up as Tolerated  Required Braces or Orthoses?: No  Position Activity Restriction  Other position/activity restrictions: Up with assist, marisol lord for RN staff, high flow O2, telemetry, R UE IV  Subjective   General  Chart Reviewed: Yes  Additional Pertinent Hx: Dementia, 2 liters home 02  Response To Previous Treatment: Patient with no complaints from previous session. Family / Caregiver Present: No  Subjective  Subjective: Patient agreeable to PT, but appears less alert than yesterday. General Comment  Comments: Lucila Thapa RN reports patient medically appropriate for PT. Orientation     Cognition   Cognition  Overall Cognitive Status: Exceptions  Arousal/Alertness: Delayed responses to stimuli  Following Commands: Follows one step commands with repetition  Attention Span: Difficulty dividing attention; Difficulty attending to directions  Memory: Decreased recall of biographical Information;Decreased recall of recent events  Safety Judgement: Decreased awareness of need for assistance;Decreased awareness of need for safety  Problem Solving: Assistance required to generate solutions;Assistance required to implement solutions;Decreased awareness of errors;Assistance required to correct errors made;Assistance required to identify errors made  Insights: Decreased awareness of deficits  Initiation: Requires cues for all  Sequencing: Requires cues for all  Cognition Comment: Pt lethargic this date.   Objective   Bed mobility  Rolling to Left: Maximum assistance;2 Person assistance  Rolling to Right: Maximum assistance;2 Person assistance  Supine to Sit: Maximum assistance;2 Person assistance  Sit to Supine: Maximum assistance;2 Person assistance  Scooting: Maximal assistance;2 Person assistance  Comment: Patient requires max assist  2 for BUE and trunk for rolling and supine individual discipline's goals. PT addressing weight shifting prior to transfers and postural control in sitting.   Therapy Time   Individual Concurrent Group Co-treatment   Time In 1344         Time Out 1410         Minutes 26                 Kristina Nigel [place]

## 2022-01-21 NOTE — CARE COORDINATION
Discharge planning    Patient transferred from ICU and chart reviewed. Appreciate prior CM notes. Patient from home with spouse. Has 02 thru [facility] at 2-3 L 24/7. Wife reports patient  has underlying dementia. He was independent with his cane but does not drive. he also has shower seat, walker and w/c    COVID   HF 60 L/90% FIO2  Testing 1/8    [facility] is following but FIO2 too high to begin precert. CM notes [employer] insurance and will need to follow up with yaquelin as in [facility] listed as Jesus. If has changed will need to have wife bring in card.

## 2022-01-21 NOTE — PROGRESS NOTES
[facility]  Office: [address], DO, Jose Manuel Francois, DO, Gregg Elver, DO, Ernestina Harada Blood, DO, Governor Shaylee MD, Zonia Ortiz MD, Zo Wen MD, Shanti Basilio MD, Bindu Gonzalez MD, Juan Manuel Nicholson MD, Sage Yañez MD, Marysol Perez, DO, Ingrid Ponce, DO, Deanna Ochoa MD,  Kye Skelton, DO, Kris Galan MD, Marty Jimenes MD, Daina Root MD, Stephanie Abraham MD, Lillie Garay MD, Nohemy Morejon MD, Nathaniel Barnett MD, Deb Soto, [facility], [facility], CNP, Norma Kan, CNP, Jon Gtz, CNS, Vicente Jorgensen, CNP, Brandon Bobo, CNP, Vickie Perkins, CNP, Liz Ribera, CNP, Mario Gómez, CNP, Rehana Jean PA-C, Myron Gomes, [facility], Dianne Patino, [facility], Gloria Alvarez, CNP, Abbe De Los Santos, CNP, Jesse Blackman, CNP, Ghada Lo, CNP, Maxx Jaimes, CNP, Sandra Bacon [facility]    Progress Note    1/21/2022    1:07 PM    Name:   Adonis Costello  MRN:     [id number]     Acct:      [de-identified]   Room:   [address] [place] [address] Day:  13  Admit Date:  1/8/2022  3:33 PM    PCP:   Bill Yan MD  Code Status:  Full Code    Subjective:     C/C:   Chief Complaint   Patient presents with    Altered Mental Status     Interval History Status: improved. Patient sitting up in bed and denies any complaints of chest pain, shortness of breath, nausea or vomiting, fevers or chills. Has occasional cough but no sputum production.     Brief History:     Per my BRYAN:  \"Shreyas Mcadams is a 59 y.o. Non- / non  male who presents with Altered Mental Status   and is admitted to the hospital for the management of COVID-19.     Since to the emergency room with altered mental status.  Is confused and not able to provide any history for me.  According to the ER nurse he was brought in by his wife after a fall at home. Clive Quijano states that he has been altered as well.  No additional information available.  He was found to have mild respiratory acidosis and started on BiPAP.  Covid is positive.  He also had JO with BUN of 23 and creatinine of 3.09.  Previous kidney function has been normal.\"    Review of Systems:     Constitutional:  negative for chills, fevers, sweats  Respiratory:  negative for dyspnea on exertion, shortness of breath, wheezing  Cardiovascular:  negative for chest pain, chest pressure/discomfort, lower extremity edema, palpitations  Gastrointestinal:  negative for abdominal pain, constipation, diarrhea, nausea, vomiting  Neurological:  negative for dizziness, headache    Medications: Allergies:     Allergies   Allergen Reactions    Latex     Bee Venom     Prednisone Other (See Comments)     Mood swings       Current Meds:   Scheduled Meds:    metoprolol tartrate  25 mg Oral BID    oxyCODONE  60 mg Oral TID    enoxaparin  30 mg SubCUTAneous BID    famotidine  20 mg Oral BID    QUEtiapine  100 mg Oral BID    ipratropium-albuterol  1 ampule Inhalation 4x daily    budesonide  0.5 mg Nebulization BID    gabapentin  100 mg Oral TID    ziprasidone  20 mg IntraMUSCular Once    And    sterile water  1.2 mL IntraMUSCular Once    busPIRone  15 mg Oral Daily    DULoxetine  60 mg Oral Daily    finasteride  5 mg Oral Daily    cetirizine  10 mg Oral Daily    rivastigmine  1 patch TransDERmal Daily    [Held by provider] spironolactone  25 mg Oral BID    sodium chloride flush  5-40 mL IntraVENous 2 times per day    Vitamin D  2,000 Units Oral Daily     Continuous Infusions:    dexmedetomidine (PRECEDEX) IV infusion Stopped (01/18/22 1210)    sodium chloride       PRN Meds: HYDROcodone-acetaminophen, aluminum & magnesium hydroxide-simethicone, potassium chloride **OR** potassium alternative oral replacement **OR** potassium chloride, albuterol, metoprolol, LORazepam **OR** LORazepam, sodium chloride flush, sodium chloride, ondansetron **OR** ondansetron, magnesium hydroxide, acetaminophen **OR** acetaminophen, guaiFENesin-dextromethorphan    Data:     Past Medical History:   has a past medical history of Anxiety, Arthritis, Atrial fib/flutter, transient, Bradycardia, COPD (chronic obstructive pulmonary disease) ([facility] Dementia ([facility] Depression, GERD (gastroesophageal reflux disease), Neuropathy, Parkinson's disease ([facility] Pneumonia, and Syncope. Social History:   reports that he has quit smoking. His smoking use included cigarettes. He has never used smokeless tobacco. He reports that he does not drink alcohol and does not use drugs. Family History:   Family History   Family history unknown: Yes       Vitals:  /67   Pulse 100   Temp 98.1 °F (36.7 °C) (Oral)   Resp 18   Ht 5' 11\" (1.803 m)   Wt 261 lb 14.5 oz (118.8 kg)   SpO2 100%   BMI 36.53 kg/m²   Temp (24hrs), Av.2 °F (37.3 °C), Min:98.1 °F (36.7 °C), Max:101.7 °F (38.7 °C)    No results for input(s): POCGLU in the last 72 hours. I/O (24Hr):     Intake/Output Summary (Last 24 hours) at 2022 1307  Last data filed at 2022 0930  Gross per 24 hour   Intake 160 ml   Output --   Net 160 ml       Labs:  Hematology:  Recent Labs     22  0346 22  0402 22  0534   WBC 10.7 9.4 9.7   RBC 4.96 4.91 5.11   HGB 13.4 13.1 13.8   HCT 41.3 41.1 45.3   MCV 83.3 83.7 88.6   MCH 27.0 26.7 27.0   MCHC 32.4 31.9 30.5   RDW 14.2 14.2 14.2    356 309   MPV 10.1 9.3 9.9     Chemistry:  Recent Labs     22  0346 22  1113 22  0402 22  0743     --  135 134*   K 3.1*  --  3.4* 3.9   CL 93*  --  96* 96*   CO2 28  --  27 25   GLUCOSE 117*  --  100* 105*   BUN 35*  --  28* 31*   CREATININE 1.20  --  1.13 1.19   MG 2.1 2.0 2.2  --    ANIONGAP 14  --  12 13   LABGLOM >60  --  >60 >60   GFRAA >60  --  >60 >60   CALCIUM 8.7  --  8.7 8.6   No results for input(s): PROT, LABALBU, LABA1C, G5CSCML, Y1SXXSD, FT4, TSH, AST, ALT, LDH, GGT, ALKPHOS, LABGGT, BILITOT, BILIDIR, AMMONIA, AMYLASE, LIPASE, LACTATE, CHOL, HDL, LDLCHOLESTEROL, CHOLHDLRATIO, TRIG, VLDL, JNP63DW, PHENYTOIN, PHENYF, URICACID, POCGLU in the last 72 hours. ABG:  Lab Results   Component Value Date    POCPH 7.414 01/09/2022    POCPCO2 40.9 01/09/2022    POCPO2 52.8 01/09/2022    POCHCO3 26.1 01/09/2022    NBEA NOT REPORTED 01/09/2022    PBEA 1 01/09/2022    ANX2OEA NOT REPORTED 01/09/2022    HNWW1IYZ 87 01/09/2022    FIO2 NOT REPORTED 01/09/2022     Lab Results   Component Value Date/Time    SPECIAL NOT REPORTED 03/30/2012 06:02 PM     Lab Results   Component Value Date/Time    CULTURE NO GROWTH 03/30/2012 06:02 PM    CULTURE  03/30/2012 06:02 PM     Performed at [place] [address] [phone]       Radiology:  XR ABDOMEN (KUB) (SINGLE AP VIEW)    Result Date: 1/16/2022  1. Unremarkable bowel gas pattern. XR CHEST PORTABLE    Result Date: 1/21/2022  No significant change in bilateral airspace disease. XR CHEST PORTABLE    Result Date: 1/20/2022  Bibasilar atelectasis with probable right lower lobe infiltrate. Small effusions right-sided PICC tip position stable. XR CHEST PORTABLE    Result Date: 1/17/2022  Persistent opacities at the lung bases with some clearing of right perihilar airspace disease. XR CHEST PORTABLE    Result Date: 1/15/2022  Bronchial wall thickening with reticulonodular opacities in the right lung the left lung base, similar to prior. This may reflect infectious/inflammatory airways process it is not the typical appearance of atypical viral pneumonitis.        Physical Examination:        General appearance:  alert, cooperative and no distress  Mental Status:  oriented to person, place and time and normal affect  Lungs:  clear to auscultation bilaterally, normal effort, diminished throughout  Heart:  regular rate and rhythm, no murmur  Abdomen:  soft, nontender, nondistended, normal bowel sounds, no masses, hepatomegaly, splenomegaly  Extremities:  no edema, redness, tenderness in the calves  Skin:  no gross lesions, rashes, induration    Assessment:        Hospital Problems           Last Modified POA    * (Principal) Pneumonia due to COVID-19 virus 1/9/2022 Yes    AMS (altered mental status) 1/9/2022 Yes    COPD without exacerbation ([facility] 1/9/2022 Yes    A-fib ([facility] 1/9/2022 Yes    Gastroesophageal reflux disease without esophagitis 1/9/2022 Yes    Neuropathy 1/9/2022 Yes    JO (acute kidney injury) ([facility] 1/9/2022 Yes    Elevated troponin 1/9/2022 Yes    Encephalopathy due to COVID-19 virus 1/18/2022 Yes    Acute respiratory failure with hypercapnia ([facility] 1/13/2022 Yes          Plan:        1. High flow and BiPAP as needed, wean as able  2. Completed Decadron  3. GI and DVT prophylaxis  4. PT and OT  5. Monitor renal function, avoid nephrotoxic agents. 6. Home psychiatric medications as ordered  7. Pulmonary and cardiology evaluations continue  8. Discharge planning to LTAC versus skilled nursing facility pending oxygen requirements and recovery.     Adan Escalona,   1/21/2022  1:07 PM

## 2022-01-22 NOTE — PLAN OF CARE
Problem: Skin Integrity:  Goal: Will show no infection signs and symptoms  Description: Will show no infection signs and symptoms  Outcome: Ongoing   Continue to turn and reposition pt every 2 hours and prn, pillow support provided and extremities elevated. Waffle mattress inflated  accordingly. . Barrier cream applied as needed. No open areas to buttocks noted. Continue to monitor skin integrity t/o      Problem: Pain:  Goal: Pain level will decrease  Description: Pain level will decrease  Outcome: Ongoing   Pt with chronic pain  on scheduled pain meds but as pt lethargic and hypoxic holding pain meds.  Pt on 100 % bipap continue to monitor

## 2022-01-22 NOTE — PROGRESS NOTES
Section of Cardiology  Progress Note      Date:  1/21/2022  Patient: Eve Batista  Admission:  1/8/2022  3:33 PM  Admit DX: Acute respiratory failure with hypercapnia (HCC) [J96.02]  Altered mental status, unspecified altered mental status type [R41.82]  AMS (altered mental status) [R41.82]  COVID-19 [U07.1]  Age:  59 y.o., 1960     LOS: 13 days     Reason for evaluation:   arrhythmia      SUBJECTIVE:     The patient was seen and examined. Notes and labs reviewed. There were not complications over night. Information obtained from the patient's nurse. This was done this morning. At 6:30 AM the patient has 7 beat run of ventricular tachycardia. He remained asymptomatic. The patient denies chest pain . he still on high flow oxygen. OBJECTIVE:    Telemetry: Sinus  /76   Pulse 112 Comment: after iv metoprolol  Temp 99.3 °F (37.4 °C) (Oral)   Resp 16   Ht 5' 11\" (1.803 m)   Wt 261 lb 14.5 oz (118.8 kg)   SpO2 92%   BMI 36.53 kg/m²     Intake/Output Summary (Last 24 hours) at 1/21/2022 1910  Last data filed at 1/21/2022 1843  Gross per 24 hour   Intake 510 ml   Output 300 ml   Net 210 ml       EXAM:   Physical exam was deferred due to Covid 19 isolation status and to minimize the spent of infection.     Current Inpatient Medications:   metoprolol tartrate  25 mg Oral BID    oxyCODONE  60 mg Oral TID    enoxaparin  30 mg SubCUTAneous BID    famotidine  20 mg Oral BID    QUEtiapine  100 mg Oral BID    ipratropium-albuterol  1 ampule Inhalation 4x daily    budesonide  0.5 mg Nebulization BID    gabapentin  100 mg Oral TID    ziprasidone  20 mg IntraMUSCular Once    And    sterile water  1.2 mL IntraMUSCular Once    busPIRone  15 mg Oral Daily    DULoxetine  60 mg Oral Daily    finasteride  5 mg Oral Daily    cetirizine  10 mg Oral Daily    rivastigmine  1 patch TransDERmal Daily    [Held by provider] spironolactone  25 mg Oral BID    sodium chloride flush  5-40 mL IntraVENous 2 times per day    Vitamin D  2,000 Units Oral Daily       IV Infusions (if any):   dexmedetomidine (PRECEDEX) IV infusion Stopped (01/18/22 1210)    sodium chloride         Diagnostics:   EKG: . ECHO: .   Ejection fraction: %  Stress Test: .  Cardiac Angiography: .    Labs:   CBC:   Recent Labs     01/20/22  0402 01/21/22  0534   WBC 9.4 9.7   HGB 13.1 13.8   HCT 41.1 45.3    309     BMP:   Recent Labs     01/20/22  0402 01/21/22  0743    134*   K 3.4* 3.9   CO2 27 25   BUN 28* 31*   CREATININE 1.13 1.19   LABGLOM >60 >60   GLUCOSE 100* 105*     No results found for: BNP  PT/INR: No results for input(s): PROTIME, INR in the last 72 hours. APTT:No results for input(s): APTT in the last 72 hours. CARDIAC ENZYMES:No results for input(s): CKTOTAL, CKMB, CKMBINDEX, TROPONINT in the last 72 hours. FASTING LIPID PANEL:  Lab Results   Component Value Date    HDL 27 09/26/2020    TRIG 186 09/26/2020     LIVER PROFILE:No results for input(s): AST, ALT, LABALBU in the last 72 hours.     ASSESSMENT:  1.  Short nonsustained ventricular tachycardia  2.  History of atrial fibrillation, status post ablation in 2014  3.  Permanent pacemaker  4.  Acute respiratory failure  5.  COVID-19 pneumonia  6.  Acute kidney injury, resolved  7.  Elevated troponin, could be related to acute kidney injury and hypoxemia  8.  Altered mental status  9.  Elevated D-dimer  10.  Elevated cardiac enzymes  11.  Frequent PVCs in singles    Patient Active Problem List   Diagnosis    AMS (altered mental status)    COPD without exacerbation ([facility]    A-fib ([facility]    Gastroesophageal reflux disease without esophagitis    Neuropathy    JO (acute kidney injury) ([address]    Elevated troponin    Pneumonia due to COVID-19 virus    Encephalopathy due to COVID-19 virus    Acute respiratory failure with hypercapnia (HCC)       PLAN:    1. Magnesium level is normal.  Potassium is 3.9 and I recommend giving 20 mEq of potassium and keep potassium level above 4.    2. Continue beta blocker therapy. Please see orders. Discussed with  and nursing.     Johan Sears MD, MD

## 2022-01-22 NOTE — PROGRESS NOTES
Pulmonary Critical Care Progress Note       Patient seen for the follow up of acute hypoxic respiratory insufficiency, Pneumonia due to COVID-19 virus     Subjective:  remains on 80% / 60 L. He does not have chest pain. Vitals: /61   Pulse 102   Temp 99 °F (37.2 °C) (Axillary)   Resp 19   Ht 5' 11\" (1.803 m)   Wt 251 lb 15.8 oz (114.3 kg)   SpO2 93%   BMI 35.14 kg/m²   General appearance:   Awake follows commands  Neck: No JVD  Lungs:  Decreased breath sounds no crackles or wheezing  Heart: regular rate and rhythm, S1, S2 normal, no gallop  Abdomen: Soft, non tender, + BS  Extremities: no cyanosis or clubbing.  No significant edema    LABs:  BMP:   Recent Labs     01/21/22  0743 01/22/22  0617   * 139   K 3.9 4.6   CO2 25 22   BUN 31* 40*   CREATININE 1.19 1.66*   LABGLOM >60 42*   GLUCOSE 105* 124*     ABG:  Lab Results   Component Value Date    OPX9CXZ NOT REPORTED 01/09/2022    FIO2 NOT REPORTED 01/09/2022       Lab Results   Component Value Date    POCPH 7.414 01/09/2022    POCPCO2 40.9 01/09/2022    POCPO2 52.8 01/09/2022    POCHCO3 26.1 01/09/2022    NBEA NOT REPORTED 01/09/2022    PBEA 1 01/09/2022    CZY6XDS NOT REPORTED 01/09/2022    [id number] 87 01/09/2022    FIO2 NOT REPORTED 01/09/2022       Impression:  · Acute hypoxic respiratory  failure requiring BiPAP and high flow  · COVID-19  Pneumonia  · Altered mental status/encephalopathy  · Acute kidney injury  · Suspected obstructive sleep apnea/Obesity  · A. fib, anxiety, arthritis, GERD  · Elevated D-dimer  · Elevated liver function    Recommendations:  · Oxygen via high flow to keep saturation above 90%  · BiPAP while asleep and as needed  · Incentive spirometry every hour while wake  · DuoNeb by nebulizer 4 times daily  · Pulmicort 0.5 q.12 hours by nebulizer  · Modified diet  · Monitor D-dimer   · Prone as tolerated  · Status post course of Decadron   · Continue Seroquel 100 mg twice daily/monitor mental status  · PT/OT  · Discussed with RN  · DVT prophylaxis on heparin 5000 q.8 hours  · Prophylaxis, Pepcid    Addendum: CXR increase in RLL airspace consolidation and developing left pleural effusion  Would send to ICU for closer observation, lasix 20 mg IV x one now    Plan of care discussed with Dr Judeen Opitz, APRN - CNP on 1/22/2022 at 11:17 AM

## 2022-01-22 NOTE — FLOWSHEET NOTE
1350 . Pt desats to 86% -adjusted HHF to 90% without change. Writer put BIPAP on pt and sats went lower to 81%. Respiratory paged- bipap at 100% and pt with sats at 83%. Dr Sarahy Lopez called and message left. Writer called wife and notified of change in resp status and questions about code status. Answered all questions . Ramu Vega would like to speak with a doctor before making a decision. 1400 Dr Berkley Garibay rounding and updated on pts. Condition and that wife would like to be called. Sheila Ornelas NP also at [facility] and updated. Stat CXR done. , stat labs ordered. Pt is arousable and speaking when asked. Continue to monitor closely  1435- sats are now 94% on BIPAP 100%. resp treatment given and further orders received.  Will monitor closely

## 2022-01-22 NOTE — PROGRESS NOTES
Section of Cardiology  Progress Note      Date:  1/22/2022  Patient: Mili Alonzo  Admission:  1/8/2022  3:33 PM  Admit DX: Acute respiratory failure with hypercapnia (HCC) [J96.02]  Altered mental status, unspecified altered mental status type [R41.82]  AMS (altered mental status) [R41.82]  COVID-19 [U07.1]  Age:  59 y.o., 1960     LOS: 14 days     Reason for evaluation:   arrhythmia      SUBJECTIVE:     Notes and labs reviewed. There were not complications over night. No acute documented issues  Telemetry showed occasional PVCs. No Runs of NSVT      OBJECTIVE:    Telemetry: Sinus  /61   Pulse 102   Temp 99 °F (37.2 °C) (Axillary)   Resp 19   Ht 5' 11\" (1.803 m)   Wt 251 lb 15.8 oz (114.3 kg)   SpO2 95%   BMI 35.14 kg/m²     Intake/Output Summary (Last 24 hours) at 1/22/2022 0945  Last data filed at 1/21/2022 1843  Gross per 24 hour   Intake 350 ml   Output 300 ml   Net 50 ml       EXAM:   Physical exam was deferred due to Covid 19 isolation status and to minimize the spent of infection. Current Inpatient Medications:   metoprolol tartrate  25 mg Oral BID    oxyCODONE  60 mg Oral TID    enoxaparin  30 mg SubCUTAneous BID    famotidine  20 mg Oral BID    QUEtiapine  100 mg Oral BID    ipratropium-albuterol  1 ampule Inhalation 4x daily    budesonide  0.5 mg Nebulization BID    gabapentin  100 mg Oral TID    ziprasidone  20 mg IntraMUSCular Once    And    sterile water  1.2 mL IntraMUSCular Once    busPIRone  15 mg Oral Daily    DULoxetine  60 mg Oral Daily    finasteride  5 mg Oral Daily    cetirizine  10 mg Oral Daily    rivastigmine  1 patch TransDERmal Daily    [Held by provider] spironolactone  25 mg Oral BID    sodium chloride flush  5-40 mL IntraVENous 2 times per day    Vitamin D  2,000 Units Oral Daily       IV Infusions (if any):   dexmedetomidine (PRECEDEX) IV infusion Stopped (01/18/22 1210)    sodium chloride         Diagnostics:   EKG: . NSR with occasional PVCs      Labs:   CBC:   Recent Labs     01/21/22  0534 01/22/22  0100   WBC 9.7 16.9*   HGB 13.8 12.9*   HCT 45.3 41.5    326     BMP:   Recent Labs     01/21/22  0743 01/22/22  0617   * 139   K 3.9 4.6   CO2 25 22   BUN 31* 40*   CREATININE 1.19 1.66*   LABGLOM >60 42*   GLUCOSE 105* 124*     No results found for: BNP  PT/INR: No results for input(s): PROTIME, INR in the last 72 hours. APTT:No results for input(s): APTT in the last 72 hours. CARDIAC ENZYMES:No results for input(s): CKTOTAL, CKMB, CKMBINDEX, TROPONINT in the last 72 hours. FASTING LIPID PANEL:  Lab Results   Component Value Date    HDL 27 09/26/2020    TRIG 186 09/26/2020     LIVER PROFILE:No results for input(s): AST, ALT, LABALBU in the last 72 hours. ASSESSMENT:  1.  Short nonsustained ventricular tachycardia  2.  History of atrial fibrillation, status post ablation in 2014  3.  Permanent pacemaker  4.  Acute respiratory failure  5.  COVID-19 pneumonia  6.  Acute kidney injury, resolved  7.  Elevated troponin, could be related to acute kidney injury and hypoxemia  8.  Altered mental status  9.  Elevated D-dimer  10.  Elevated cardiac enzymes  11.  Frequent PVCs in singles    PLAN:    1. Maintain Mg2+ >2 and K+ >4.    2. Continue beta blocker therapy. 3. Continue treatment of COVID 19 as stressor which is exacerbating premature beats  4.  Will continue to follow      Please see orders    Riley Hensley MD, MD

## 2022-01-22 NOTE — PLAN OF CARE
Problem: Non-Violent Restraints  Goal: Patient's dignity will be maintained  1/21/2022 2214 by Jing Chavez RN  Outcome: Ongoing     Problem: Pain:  Goal: Control of chronic pain  Description: Control of chronic pain  1/21/2022 2214 by Jing Chavez RN  Outcome: Ongoing     Problem: Skin Integrity:  Goal: Will show no infection signs and symptoms  Description: Will show no infection signs and symptoms  1/21/2022 2214 by Jing Chavez RN  Outcome: Ongoing     Problem: Skin Integrity:  Goal: Absence of new skin breakdown  Description: Absence of new skin breakdown  1/21/2022 2214 by Jing Chavez RN  Outcome: Ongoing     Problem: Falls - Risk of:  Goal: Will remain free from falls  Description: Will remain free from falls  1/21/2022 2214 by Jing Chavez RN  Outcome: Ongoing     Problem: Falls - Risk of:  Goal: Absence of physical injury  Description: Absence of physical injury  1/21/2022 2214 by Jing Chavez RN  Outcome: Ongoing     Problem:  Body Temperature -  Risk of, Imbalanced  Goal: Complications related to the disease process, condition or treatment will be avoided or minimized  1/21/2022 2214 by Jing Chavez RN  Outcome: Ongoing     Problem: Isolation Precautions - Risk of Spread of Infection  Goal: Prevent transmission of infection  1/21/2022 2214 by Jing Chavez RN  Outcome: Ongoing     Problem: Loneliness or Risk for Loneliness  Goal: Demonstrate positive use of time alone when socialization is not possible  1/21/2022 2214 by Jing Chavez RN  Outcome: Ongoing     Problem: Patient Education: Go to Patient Education Activity  Goal: Patient/Family Education  1/21/2022 2214 by Jing Chavez RN  Outcome: Ongoing     Problem: Injury - Risk of, Physical Injury:  Goal: Will remain free from falls  Description: Will remain free from falls  1/21/2022 2214 by Jing Chavez RN  Outcome: Ongoing     Problem: Injury - Risk of, Physical Injury:  Goal: Absence of physical injury  Description: Absence of physical injury  1/21/2022 2214 by Heather Estrada RN  Outcome: Ongoing     Problem: VIOLENT RESTRAINTS  Goal: Patient's dignity will be maintained  1/21/2022 2214 by Heather Estrada RN  Outcome: Ongoing

## 2022-01-22 NOTE — PROGRESS NOTES
[facility]  Office: [address], DO, Kavita Beniteziphant, DO, Madi Osmantangelacarolee, DO, Gautam Curtis Blood, DO, Omega Mcguire MD, Fabian Parrish MD, Zita Elliott MD, Pawan Ruiz MD, Frandy Askew MD, Eleonora Collazo MD, Santiago Briones MD, Paula Gold, DO, Dayna Ada, DO, Cesia Ferreira MD,  Lexie Rabago, DO, Theron Pulliam MD, Elyssa Casas MD, Alex Lucero MD, Serenity Chaves MD, Mitchel Perry MD, Luz Marina Romero MD, Paulo Vance MD, Pricilla Fothergill, [facility], [facility], [facility], Benji Hagen, CNP, Lisa Webb, CNS, Ingrid Ashraf, CNP, Amador Mckenzie, CNP, Jose Amin, CNP, Chloe Cartagena, CNP, Sandip Fuller, CNP, Servando Douglas PA-C, Eli Olivarez, [facility], Francesca Salas, [facility], Venkatesh Nguyen, CNP, Penny Gann, CNP, Kaitlyn Vogt, CNP, Sylwia York, CNP, Oziel Casey CNP, [facility]    Progress Note    1/22/2022    1:31 PM    Name:   Tara Scott  MRN:     [id number]     Acct:      [de-identified]   Room:   [address] [place] [address] Day:  15  Admit Date:  1/8/2022  3:33 PM    PCP:   Jovita Garcia MD  Code Status:  Full Code    Subjective:     C/C:   Chief Complaint   Patient presents with   Kasey Current Altered Mental Status     Interval History Status: not changed. No focal weakness hypoxia today requiring BiPAP/high flow oxygen increased. He is resting comfortably, complains of shortness of breath and cough but denies any chest pain, sputum production, fevers or chills or other complaints. Had low-grade fever overnight, cultures were drawn.     Brief History:     Per my BRYAN:  \"Shreyas Mcadams is a 59 y.o. Non- / non  male who presents with Altered Mental Status   and is admitted to the hospital for the management of COVID-19.     Since to the emergency room with altered mental status.  Is confused and not able to provide any history for me.  According to the ER nurse he was brought in by his wife after a fall at home. Tyson Perez states that he has been altered as well.  No additional information available. Randall Breen was found to have mild respiratory acidosis and started on BiPAP.  Covid is positive.  He also had JO with BUN of 23 and creatinine of 3.09.  Previous kidney function has been normal.\"    Review of Systems:     Constitutional:  negative for chills, fevers, sweats  Respiratory: Positive for cough, dyspnea on exertion, shortness of breath, negative for wheezing  Cardiovascular:  negative for chest pain, chest pressure/discomfort, lower extremity edema, palpitations  Gastrointestinal:  negative for abdominal pain, constipation, diarrhea, nausea, vomiting  Neurological:  negative for dizziness, headache    Medications: Allergies:     Allergies   Allergen Reactions    Latex     Bee Venom     Prednisone Other (See Comments)     Mood swings       Current Meds:   Scheduled Meds:    metoprolol tartrate  25 mg Oral BID    oxyCODONE  60 mg Oral TID    enoxaparin  30 mg SubCUTAneous BID    famotidine  20 mg Oral BID    QUEtiapine  100 mg Oral BID    ipratropium-albuterol  1 ampule Inhalation 4x daily    budesonide  0.5 mg Nebulization BID    gabapentin  100 mg Oral TID    ziprasidone  20 mg IntraMUSCular Once    And    sterile water  1.2 mL IntraMUSCular Once    busPIRone  15 mg Oral Daily    DULoxetine  60 mg Oral Daily    finasteride  5 mg Oral Daily    cetirizine  10 mg Oral Daily    rivastigmine  1 patch TransDERmal Daily    [Held by provider] spironolactone  25 mg Oral BID    sodium chloride flush  5-40 mL IntraVENous 2 times per day    Vitamin D  2,000 Units Oral Daily     Continuous Infusions:    dexmedetomidine (PRECEDEX) IV infusion Stopped (01/18/22 1210)    sodium chloride       PRN Meds: ibuprofen, HYDROcodone-acetaminophen, aluminum & magnesium hydroxide-simethicone, potassium chloride **OR** potassium alternative oral replacement **OR** potassium chloride, albuterol, metoprolol, LORazepam **OR** LORazepam, sodium chloride flush, sodium chloride, ondansetron **OR** ondansetron, magnesium hydroxide, acetaminophen **OR** acetaminophen, guaiFENesin-dextromethorphan    Data:     Past Medical History:   has a past medical history of Anxiety, Arthritis, Atrial fib/flutter, transient, Bradycardia, COPD (chronic obstructive pulmonary disease) ([facility] [address] D[facility] Depression, GERD (gastroesophageal reflux disease), Neuropathy, Parkinson's disease ([facility] Pneumonia, and Syncope. Social History:   reports that he has quit smoking. His smoking use included cigarettes. He has never used smokeless tobacco. He reports that he does not drink alcohol and does not use drugs. Family History:   Family History   Family history unknown: Yes       Vitals:  BP 99/66   Pulse 97   Temp 98.8 °F (37.1 °C) (Axillary)   Resp 18   Ht 5' 11\" (1.803 m)   Wt 251 lb 15.8 oz (114.3 kg)   SpO2 90%   BMI 35.14 kg/m²   Temp (24hrs), Av.6 °F (37.6 °C), Min:97.2 °F (36.2 °C), Max:102.7 °F (39.3 °C)    No results for input(s): POCGLU in the last 72 hours. I/O (24Hr):     Intake/Output Summary (Last 24 hours) at 2022 1331  Last data filed at 2022 1843  Gross per 24 hour   Intake 250 ml   Output 300 ml   Net -50 ml       Labs:  Hematology:  Recent Labs     22  0402 22  0534 22  0100   WBC 9.4 9.7 16.9*   RBC 4.91 5.11 4.76   HGB 13.1 13.8 12.9*   HCT 41.1 45.3 41.5   MCV 83.7 88.6 87.2   MCH 26.7 27.0 27.1   MCHC 31.9 30.5 31.1   RDW 14.2 14.2 13.9    309 326   MPV 9.3 9.9 9.6     Chemistry:  Recent Labs     22  0402 22  0743 22  0617    134* 139   K 3.4* 3.9 4.6   CL 96* 96* 102   CO2 27 25 22   GLUCOSE 100* 105* 124*   BUN 28* 31* 40*   CREATININE 1.13 1.19 1.66*   MG 2.2  --   --    ANIONGAP 12 13 15   LABGLOM >60 >60 42*   GFRAA >60 >60 51*   CALCIUM 8.7 8.6 8.9   No results for input(s): PROT, LABALBU, LABA1C, R7ARMMF, U4TPDOF, FT4, TSH, AST, ALT, LDH, GGT, ALKPHOS, LABGGT, BILITOT, BILIDIR, AMMONIA, AMYLASE, LIPASE, LACTATE, CHOL, HDL, LDLCHOLESTEROL, CHOLHDLRATIO, TRIG, VLDL, TGI17QV, PHENYTOIN, PHENYF, URICACID, POCGLU in the last 72 hours. ABG:  Lab Results   Component Value Date    POCPH 7.414 01/09/2022    POCPCO2 40.9 01/09/2022    POCPO2 52.8 01/09/2022    POCHCO3 26.1 01/09/2022    NBEA NOT REPORTED 01/09/2022    PBEA 1 01/09/2022    IOP9AUL NOT REPORTED 01/09/2022    DSZR0FGJ 87 01/09/2022    FIO2 NOT REPORTED 01/09/2022     Lab Results   Component Value Date/Time    SPECIAL RIGHT HAND 15ML 01/22/2022 01:08 AM     Lab Results   Component Value Date/Time    CULTURE NO GROWTH <24 HRS 01/22/2022 01:08 AM       Radiology:  XR CHEST PORTABLE    Result Date: 1/21/2022  No significant change in bilateral airspace disease. XR CHEST PORTABLE    Result Date: 1/20/2022  Bibasilar atelectasis with probable right lower lobe infiltrate. Small effusions right-sided PICC tip position stable. XR CHEST PORTABLE    Result Date: 1/17/2022  Persistent opacities at the lung bases with some clearing of right perihilar airspace disease. XR CHEST PORTABLE    Result Date: 1/15/2022  Bronchial wall thickening with reticulonodular opacities in the right lung the left lung base, similar to prior. This may reflect infectious/inflammatory airways process it is not the typical appearance of atypical viral pneumonitis.        Physical Examination:        General appearance:  alert, cooperative and no distress  Mental Status:  oriented to person, place and time and normal affect  Lungs:  clear to auscultation bilaterally, normal effort, diminished throughout  Heart:  regular rate and rhythm, no murmur  Abdomen:  soft, nontender, nondistended, normal bowel sounds, no masses, hepatomegaly, splenomegaly  Extremities:  no edema, redness, tenderness in the calves  Skin:  no gross lesions, rashes, induration    Assessment:        Hospital Problems           Last Modified POA    * (Principal) Pneumonia due to COVID-19 virus 1/9/2022 Yes    AMS (altered mental status) 1/9/2022 Yes    COPD without exacerbation ([address] 1/9/2022 Yes    A-fib ([address] 1/9/2022 Yes    Gastroesophageal reflux disease without esophagitis 1/9/2022 Yes    Neuropathy 1/9/2022 Yes    JO (acute kidney injury) ([address] 1/9/2022 Yes    Elevated troponin 1/9/2022 Yes    Encephalopathy due to COVID-19 virus 1/18/2022 Yes    Acute respiratory failure with hypercapnia ([address] 1/13/2022 Yes          Plan:        1. Stat portable chest x-ray and labs ordered  2. Start Zosyn low-grade fever overnight and worsening confusion present. 3. GI DVT prophylaxis  4. Continue high flow oxygen/BiPAP  5. PT and OT as able  6. Monitor renal function, avoid nephrotoxic agents  7. Continue home maintenance medications  8. Aerosols as ordered  9.  See orders for details    Alistair Nose, DO  1/22/2022  1:31 PM

## 2022-01-22 NOTE — PLAN OF CARE
Conversation with patient's spouse and Gil Yañez and updated on current condition. Went over current x-ray findings and increased difficulty breathing with desaturations. Per patient's prior wishes he would not want to be intubated or maintained on machines that she would likely want a chance to improve. She requested to change CODE STATUS to DNR CC arrest with no intubation after discussion of different CODE STATUS options.

## 2022-01-23 NOTE — PROGRESS NOTES
[facility]  Office: [address], DO, Jose Manuel Delaware, DO, Gregg Elver, DO, Liss Harada Blood, DO, Governor Shaylee MD, Zonia Ortiz MD, Zo Wen MD, Shanti Basilio MD, Bindu Gonzalez MD, Juan Manuel Nicholson MD, Sage Yañez MD, Marysol Perez, DO, Ingrid Ponce, DO, Deanna Ochoa MD,  Kye Skelton, DO, Kris Galan MD, Marty Jimenes MD, Daina Root MD, Stephanie Abraham MD, Lillie Garay MD, Nohemy Morejon MD, Nathaniel Barnett MD, Deb Soto, Harmeet Eli, CNP, Norma Kan, CNP, Jon Gtz, CNS, Vicente Jorgensen, CNP, Brandon Bobo, CNP, Vickie Perkins, CNP, Liz Ribera, CNP, Mario Gómez, CNP, Rehana Jean PA-C, Myron Gomes, [facility], Dianne Patino, [facility], Gloria Alvarez, CNP, Abbe De Los Santos, CNP, Jesse Blackman, CNP, Ghada Lo, CNP, Maxx Jaimes, CNP, Sandra Bacon, [facility]    Progress Note    1/23/2022    2:21 PM    Name:   Adonis Costello  MRN:     [id number]     [place]:      [de-identified]   Room:   [address] [place] [address] Day:  13  Admit Date:  1/8/2022  3:33 PM    PCP:   Bill Yan MD  Code Status:  DNR-CCA    Subjective:     C/C:   Chief Complaint   Patient presents with   [facility] Altered Mental Status     Interval History Status: not changed. Patient is drowsy, arousable. Tolerating high flow oxygen, has been off his BiPAP this morning.   Does not respond to questions, eye-opening but does not follow commands    Brief History:     Per my BRYAN:  \"Shreyas Mcadams is a 59 y.o. Non- / non  male who presents with Altered Mental Status   and is admitted to the hospital for the management of COVID-19.     Since to the emergency room with altered mental status.  Is confused and not able to provide any history for me.  According to the ER nurse he was brought in by his wife after a fall at home. Clive Quijano states that he has been altered as well.  No additional information available.  He was found to have mild respiratory acidosis and started on BiPAP.  Covid is positive.  He also had JO with BUN of 23 and creatinine of 3.09.  Previous kidney function has been normal.\"    Review of Systems:     Constitutional:  negative for chills, fevers, sweats  Respiratory:  negative for cough, dyspnea on exertion, shortness of breath, wheezing  Cardiovascular:  negative for chest pain, chest pressure/discomfort, lower extremity edema, palpitations  Gastrointestinal:  negative for abdominal pain, constipation, diarrhea, nausea, vomiting  Neurological:  negative for dizziness, headache    Medications: Allergies:     Allergies   Allergen Reactions    Latex     Bee Venom     Prednisone Other (See Comments)     Mood swings       Current Meds:   Scheduled Meds:    piperacillin-tazobactam  4,500 mg IntraVENous Q8H    metoprolol tartrate  25 mg Oral BID    oxyCODONE  60 mg Oral TID    enoxaparin  30 mg SubCUTAneous BID    famotidine  20 mg Oral BID    QUEtiapine  100 mg Oral BID    ipratropium-albuterol  1 ampule Inhalation 4x daily    budesonide  0.5 mg Nebulization BID    gabapentin  100 mg Oral TID    busPIRone  15 mg Oral Daily    DULoxetine  60 mg Oral Daily    finasteride  5 mg Oral Daily    cetirizine  10 mg Oral Daily    rivastigmine  1 patch TransDERmal Daily    [Held by provider] spironolactone  25 mg Oral BID    sodium chloride flush  5-40 mL IntraVENous 2 times per day    Vitamin D  2,000 Units Oral Daily     Continuous Infusions:    sodium chloride 25 mL (01/22/22 1126)     PRN Meds: ibuprofen, HYDROcodone-acetaminophen, aluminum & magnesium hydroxide-simethicone, potassium chloride **OR** potassium alternative oral replacement **OR** potassium chloride, albuterol, metoprolol, LORazepam **OR** LORazepam, sodium chloride flush, sodium chloride, ondansetron **OR** ondansetron, magnesium hydroxide, acetaminophen **OR** acetaminophen, guaiFENesin-dextromethorphan    Data:     Past Medical History:   has a past medical history of Anxiety, Arthritis, Atrial fib/flutter, transient, Bradycardia, COPD (chronic obstructive pulmonary disease) ([facility] [address] Dementia ([facility] [address] Depression, GERD (gastroesophageal reflux disease), Neuropathy, Parkinson's disease ([facility] [address] Pneumonia, and Syncope. Social History:   reports that he has quit smoking. His smoking use included cigarettes. He has never used smokeless tobacco. He reports that he does not drink alcohol and does not use drugs. Family History:   Family History   Family history unknown: Yes       Vitals:  /70   Pulse 93   Temp 99.5 °F (37.5 °C) (Axillary)   Resp 18   Ht 5' 11\" (1.803 m)   Wt 247 lb 9.2 oz (112.3 kg)   SpO2 93%   BMI 34.53 kg/m²   Temp (24hrs), Av.8 °F (37.7 °C), Min:97.5 °F (36.4 °C), Max:102.2 °F (39 °C)    No results for input(s): POCGLU in the last 72 hours. I/O (24Hr):     Intake/Output Summary (Last 24 hours) at 2022 1421  Last data filed at 2022 0912  Gross per 24 hour   Intake --   Output 200 ml   Net -200 ml       Labs:  Hematology:  Recent Labs     22  0534 22  0100 22  1438   WBC 9.7 16.9*  --    RBC 5.11 4.76  --    HGB 13.8 12.9*  --    HCT 45.3 41.5  --    MCV 88.6 87.2  --    MCH 27.0 27.1  --    MCHC 30.5 31.1  --    RDW 14.2 13.9  --     326  --    MPV 9.9 9.6  --    DDIMER  --   --  2.32*     Chemistry:  Recent Labs     22  0743 22  0617 22  1438 22  1850 22  0508   * 139  --   --  142   K 3.9 4.6  --   --  4.1   CL 96* 102  --   --  103   CO2 25 22  --   --  27   GLUCOSE 105* 124*  --   --  146*   BUN 31* 40*  --   --  39*   CREATININE 1.19 1.66*  --   --  1.49*   ANIONGAP 13 15  --   --  12   LABGLOM >60 42*  --   --  48*   GFRAA >60 51*  --   --  58*   CALCIUM 8.6 8.9  --   --  8.6   PROBNP  --   --  894*  --   --    TROPHS  --   --  30* 31*  --    No results for input(s): PROT, LABALBU, LABA1C, N0KFONW, D2OOSME, FT4, TSH, AST, ALT, LDH, GGT, ALKPHOS, LABGGT, BILITOT, BILIDIR, AMMONIA, AMYLASE, LIPASE, LACTATE, CHOL, HDL, LDLCHOLESTEROL, CHOLHDLRATIO, TRIG, VLDL, HZV92HG, PHENYTOIN, PHENYF, URICACID, POCGLU in the last 72 hours. ABG:  Lab Results   Component Value Date    POCPH 7.388 01/22/2022    POCPCO2 48.5 01/22/2022    POCPO2 50.0 01/22/2022    POCHCO3 29.2 01/22/2022    NBEA NOT REPORTED 01/22/2022    PBEA 3 01/22/2022    CNF4EWS NOT REPORTED 01/22/2022    CBII0STW 84 01/22/2022    FIO2 100.0 01/22/2022     Lab Results   Component Value Date/Time    SPECIAL RIGHT HAND 15ML 01/22/2022 01:08 AM     Lab Results   Component Value Date/Time    CULTURE NO GROWTH 1 DAY 01/22/2022 01:08 AM       Radiology:  XR CHEST PORTABLE    Result Date: 1/22/2022  Patchy bilateral consolidative opacities right greater than left mildly worsened from the previous exam compatible with multifocal pneumonia. XR CHEST PORTABLE    Result Date: 1/21/2022  No significant change in bilateral airspace disease. XR CHEST PORTABLE    Result Date: 1/20/2022  Bibasilar atelectasis with probable right lower lobe infiltrate. Small effusions right-sided PICC tip position stable. XR CHEST PORTABLE    Result Date: 1/17/2022  Persistent opacities at the lung bases with some clearing of right perihilar airspace disease.        Physical Examination:        General appearance: Arousable, no acute distress  Mental Status: Orientation cannot be assessed due to drowsiness  Lungs:  clear to auscultation bilaterally, normal effort, diminished throughout, high flow oxygen use at 60 L 90% FiO2  Heart:  regular rate and rhythm, no murmur  Abdomen:  soft, nontender, nondistended, normal bowel sounds, no masses, hepatomegaly, splenomegaly  Extremities:  no edema, redness, tenderness in the calves  Skin:  no gross lesions, rashes, induration    Assessment:        Hospital Problems           Last Modified POA    * (Principal) Pneumonia due to COVID-19 virus 1/9/2022 Yes    AMS (altered mental status) 1/9/2022 Yes    COPD without exacerbation ([facility] [address] 1/9/2022 Yes    A-fib ([facility] [address] 1/9/2022 Yes    Gastroesophageal reflux disease without esophagitis 1/9/2022 Yes    Neuropathy 1/9/2022 Yes    JO (acute kidney injury) ([facility] [address] 1/9/2022 Yes    Elevated troponin 1/9/2022 Yes    Encephalopathy due to COVID-19 virus 1/18/2022 Yes    Acute respiratory failure with hypercapnia ([facility] [address] 1/13/2022 Yes          Plan:        1. Continue high flow oxygen and BiPAP, wean FiO2 as able  2. GI and DVT prophylaxis  3. PT and OT as able  4. Continue Zosyn, started yesterday for worsening right lower lobe infiltrate, possible aspiration  5. Await cultures  6. Avoid nephrotoxic agents  7. Aerosol protocol  8. Continue supportive measures.     Cande Escalona,   1/23/2022  2:21 PM

## 2022-01-23 NOTE — PLAN OF CARE
Problem: Non-Violent Restraints  Goal: Removal from restraints as soon as assessed to be safe  Outcome: Ongoing     Problem: Pain:  Goal: Pain level will decrease  Description: Pain level will decrease  1/22/2022 2324 by Romel Beltrán RN  Outcome: Ongoing     Problem: Skin Integrity:  Goal: Will show no infection signs and symptoms  Description: Will show no infection signs and symptoms  1/22/2022 2324 by Romel Beltrán RN  Outcome: Ongoing     Problem: Falls - Risk of:  Goal: Will remain free from falls  Description: Will remain free from falls  1/22/2022 2324 by Romel Beltrán RN  Outcome: Ongoing

## 2022-01-23 NOTE — PROGRESS NOTES
Section of Cardiology  Progress Note      Date:  1/23/2022  Patient: Yolis Face  Admission:  1/8/2022  3:33 PM  Admit DX: Acute respiratory failure with hypercapnia (HCC) [J96.02]  Altered mental status, unspecified altered mental status type [R41.82]  AMS (altered mental status) [R41.82]  COVID-19 [U07.1]  Age:  59 y.o., 1960     LOS: 15 days     Reason for evaluation:   arrhythmia      SUBJECTIVE:     Notes and labs reviewed. Pt had episode of hypoxia and somnolence overnight  , Trops 30-31  Has been in Sinus Tach in the monitor  Pt called on phone in room and he is doing well today. Awake, alert, denies CP, palpitations, edema. He is upset he can't move around his room. No complaints at this time medically      OBJECTIVE:    Telemetry: Sinus  /82   Pulse 120   Temp 97.6 °F (36.4 °C) (Axillary)   Resp 25   Ht 5' 11\" (1.803 m)   Wt 247 lb 9.2 oz (112.3 kg)   SpO2 94%   BMI 34.53 kg/m²     Intake/Output Summary (Last 24 hours) at 1/23/2022 8066  Last data filed at 1/23/2022 0912  Gross per 24 hour   Intake --   Output 200 ml   Net -200 ml       EXAM:   Physical exam was deferred due to Covid 19 isolation status and to minimize the spent of infection.     Current Inpatient Medications:   piperacillin-tazobactam  4,500 mg IntraVENous Q8H    metoprolol tartrate  25 mg Oral BID    oxyCODONE  60 mg Oral TID    enoxaparin  30 mg SubCUTAneous BID    famotidine  20 mg Oral BID    QUEtiapine  100 mg Oral BID    ipratropium-albuterol  1 ampule Inhalation 4x daily    budesonide  0.5 mg Nebulization BID    gabapentin  100 mg Oral TID    busPIRone  15 mg Oral Daily    DULoxetine  60 mg Oral Daily    finasteride  5 mg Oral Daily    cetirizine  10 mg Oral Daily    rivastigmine  1 patch TransDERmal Daily    [Held by provider] spironolactone  25 mg Oral BID    sodium chloride flush  5-40 mL IntraVENous 2 times per day    Vitamin D  2,000 Units Oral Daily       IV Infusions (if any):   sodium chloride 25 mL (01/22/22 1446)       Diagnostics:   EKG: . NSR with occasional PVCs      Labs:   CBC:   Recent Labs     01/21/22  0534 01/22/22  0100   WBC 9.7 16.9*   HGB 13.8 12.9*   HCT 45.3 41.5    326     BMP:   Recent Labs     01/22/22  0617 01/23/22  0508    142   K 4.6 4.1   CO2 22 27   BUN 40* 39*   CREATININE 1.66* 1.49*   LABGLOM 42* 48*   GLUCOSE 124* 146*     No results found for: BNP  PT/INR: No results for input(s): PROTIME, INR in the last 72 hours. APTT:No results for input(s): APTT in the last 72 hours. CARDIAC ENZYMES:  Recent Labs     01/22/22  1438 01/22/22  1850   TROPONINT NOT REPORTED NOT REPORTED     FASTING LIPID PANEL:  Lab Results   Component Value Date    HDL 27 09/26/2020    TRIG 186 09/26/2020     LIVER PROFILE:No results for input(s): AST, ALT, LABALBU in the last 72 hours. ASSESSMENT:  1.  Short nonsustained ventricular tachycardia  2.  History of atrial fibrillation, status post ablation in 2014  3.  Permanent pacemaker  4.  Acute respiratory failure  5.  COVID-19 pneumonia  6.  Acute kidney injury, resolved now returned  7.  Elevated troponin, could be related to acute kidney injury and hypoxemia  8.  Altered mental status  9.  Elevated D-dimer  10.  Elevated cardiac enzymes  11.  Frequent PVCs in singles    PLAN:  1. Maintain Mg2+ >2 and K+ >4.    2. Continue beta blocker therapy at current dose. 3. Continue treatment of COVID 19 as stressor which is exacerbating premature beats  4. 2D ECHO to assess LV function and fluid status in setting of recent JO, troponin elevation, BNP and sinus tachycardia      Please see orders.  Discussed with pt/nurse    Tayla Schwartz MD, MD

## 2022-01-23 NOTE — PROGRESS NOTES
Pulmonary Critical Care Progress Note       Patient seen for the follow up of acute hypoxic respiratory insufficiency, Pneumonia due to COVID-19 virus     Subjective:  Currently on 90% / 60 L. He c/o cough with sputum production  ABG sent yesterday for lethargy - no significant CO2 elevation  Vitals: /70   Pulse 93   Temp 99.5 °F (37.5 °C) (Axillary)   Resp 18   Ht 5' 11\" (1.803 m)   Wt 247 lb 9.2 oz (112.3 kg)   SpO2 93%   BMI 34.53 kg/m²   General appearance:   Awake follows commands, still lethargic  Neck: No JVD  Lungs:  Decreased breath sounds, weak cough, + rhonchi, 93-96% 02 sat  Heart: regular rate and rhythm, S1, S2 normal, no gallop  Abdomen: Soft, non tender, + BS  Extremities: no cyanosis or clubbing.  No significant edema    LABs:  BMP:   Recent Labs     01/22/22  0617 01/23/22  0508    142   K 4.6 4.1   CO2 22 27   BUN 40* 39*   CREATININE 1.66* 1.49*   LABGLOM 42* 48*   GLUCOSE 124* 146*     ABG:  Lab Results   Component Value Date    QWE1HXK NOT REPORTED 01/22/2022    FIO2 100.0 01/22/2022       Lab Results   Component Value Date    POCPH 7.388 01/22/2022    POCPCO2 48.5 01/22/2022    POCPO2 50.0 01/22/2022    POCHCO3 29.2 01/22/2022    NBEA NOT REPORTED 01/22/2022    PBEA 3 01/22/2022    JUS8NGC NOT REPORTED 01/22/2022    ZOWV4IDP 84 01/22/2022    FIO2 100.0 01/22/2022       Impression:  · Acute hypoxic respiratory  failure requiring BiPAP and high flow  · COVID-19  Pneumonia - worsening on CXR 1-22-22 - ABx  · Altered mental status/encephalopathy  · Acute kidney injury  · Suspected obstructive sleep apnea/Obesity  · A. fib, anxiety, arthritis, GERD  · Elevated D-dimer  · Elevated liver function    Recommendations:  · Oxygen via high flow to keep saturation above 90%  · BiPAP while asleep and as needed  · Incentive spirometry every hour while wake  · DuoNeb by nebulizer 4 times daily  · Pulmicort 0.5 q.12 hours by nebulizer  · Modified diet  · Monitor D-dimer   · Prone as tolerated  · Status post course of Decadron   · Continue Seroquel 100 mg twice daily/monitor mental status  · PT/OT  · DVT prophylaxis on heparin 5000 q.8 hours  · Prophylaxis, Pepcid    Plan of care discussed with PATRICK Bocanegra - CNP on 1/23/2022 at 12:24 PM

## 2022-01-24 PROBLEM — E43 SEVERE MALNUTRITION (HCC): Status: ACTIVE | Noted: 2022-01-01

## 2022-01-24 NOTE — PLAN OF CARE
Problem: Skin Integrity:  Goal: Will show no infection signs and symptoms  Description: Will show no infection signs and symptoms  Outcome: Ongoing  Goal: Absence of new skin breakdown  Description: Absence of new skin breakdown  Outcome: Ongoing     Turn every 2 hours

## 2022-01-24 NOTE — PLAN OF CARE
Problem: Pain:  Goal: Pain level will decrease  Description: Pain level will decrease  Outcome: Ongoing   Pt rated pain as high as 8 this shift. Scheduled oxycodone effective for pain control. Will continue to monitor. Problem: Skin Integrity:  Goal: Will show no infection signs and symptoms  Description: Will show no infection signs and symptoms  Outcome: Ongoing  No new s/s of infection noted this shift. Problem: Skin Integrity:  Goal: Absence of new skin breakdown  Description: Absence of new skin breakdown  Outcome: Ongoing    No new skin issues noted this shift. Problem: Falls - Risk of:  Goal: Will remain free from falls  Description: Will remain free from falls  Outcome: Ongoing   No falls noted this shift. Problem: Falls - Risk of:  Goal: Absence of physical injury  Description: Absence of physical injury  Outcome: Ongoing   No injury noted this shift. Problem: Gas Exchange - Impaired  Goal: Absence of hypoxia  Outcome: Ongoing   Pt continues on high flow heated via nc. Will continue to monitor. Problem: Breathing Pattern - Ineffective  Goal: Ability to achieve and maintain a regular respiratory rate  Outcome: Ongoing   Monitoring. Problem: Body Temperature -  Risk of, Imbalanced  Goal: Ability to maintain a body temperature within defined limits  Outcome: Ongoing   Pt afebrile this shift. Will continue to monitor. Problem: Nutrition Deficits  Goal: Optimize nutritional status  Outcome: Ongoing   Pt tolerating small meals, continuing to encourage po intake. Problem: Risk for Fluid Volume Deficit  Goal: Maintain normal heart rhythm  Outcome: Ongoing   Pt continues to tachycardic. See notes.

## 2022-01-24 NOTE — PROGRESS NOTES
Pulmonary Critical Care Progress Note  Madeleine Srinivasan CNP/Alejandro Diana MD     Patient seen for the follow up of acute hypoxic respiratory insufficiency, Pneumonia due to COVID-19 virus     Subjective:  He is resting in bed, one-to-one sitter at bedside. He used BiPAP overnight. He remains on high flow, currently on 90% / 60 L. He does not have chest pain. His shortness of breath is not much changed. He has occasional cough, mostly dry. Appetite is getting better. Examination:  Vitals: /74   Pulse 123   Temp 97.7 °F (36.5 °C) (Oral)   Resp 22   Ht 5' 11\" (1.803 m)   Wt 247 lb 9.2 oz (112.3 kg)   SpO2 93%   BMI 34.53 kg/m²   General appearance:   Awake follows commands, slow to respond  Neck: No JVD  Lungs:  Decreased breath sounds no crackles or wheezing  Heart: regular rate and rhythm, S1, S2 normal, no gallop  Abdomen: Soft, non tender, + BS  Extremities: no cyanosis or clubbing.  No significant edema    LABs:  BMP:   Recent Labs     01/23/22  0508 01/24/22  0506    143   K 4.1 4.0   CO2 27 29   BUN 39* 35*   CREATININE 1.49* 1.24*   LABGLOM 48* 59*   GLUCOSE 146* 117*     ABG:  Lab Results   Component Value Date    BKK9XGT NOT REPORTED 01/22/2022    FIO2 100.0 01/22/2022       Lab Results   Component Value Date    POCPH 7.388 01/22/2022    POCPCO2 48.5 01/22/2022    POCPO2 50.0 01/22/2022    POCHCO3 29.2 01/22/2022    NBEA NOT REPORTED 01/22/2022    PBEA 3 01/22/2022    OUN4YOS NOT REPORTED 01/22/2022    XVYN6JVH 84 01/22/2022    FIO2 100.0 01/22/2022       Radiology:  X-ray chest: 1/22/2022      Impression:  · Acute hypoxic respiratory  failure requiring BiPAP and high flow  · COVID-19  Pneumonia  · Altered mental status/encephalopathy  · Acute kidney injury  · Suspected obstructive sleep apnea/Obesity  · A. fib, anxiety, arthritis, GERD  · Elevated D-dimer  · Elevated liver function    Recommendations:  · Oxygen via high flow to keep saturation above 90%, currently on 60 L / 90% FiO2  · BiPAP while asleep and as needed  · Incentive spirometry every hour while wake  · DuoNeb by nebulizer 4 times daily  · Pulmicort 0.5 q.12 hours by nebulizer  · Zosyn  · Seroquel 100 mg twice daily  · Monitor mental status  · Diet as tolerated, encourage oral intake  · Prone as tolerated  · Status post course of Decadron   · PT/OT  · Discussed with RN  · DVT prophylaxis on Lovenox 30 mg twice daily  · GI prophylaxis, Pepcid  · DNR CCA  · We will follow with you    Electronically signed by     PATRICK Ayon CNP on 1/24/2022 at 9:33 AM  Pulmonary Critical Care and Sleep Medicine,  [facility] AT [place]: [phone]

## 2022-01-24 NOTE — PROGRESS NOTES
Comprehensive Nutrition Assessment    Type and Reason for Visit:  Reassess    Nutrition Recommendations/Plan:  1. Continue ADULT DIET; Dysphagia - Minced and Moist  2. Continue Ensure High Protein and Magic Cup 2x/day  3. Monitor p.o intakes and labs    Nutrition Assessment:  Patient continues to consume 51-75% of Ensure High Protein. Meal intakes are improving and patient ate 26-50% at breakfast this morning. It is noted that weight is trending down. According to electronic weight records patient has lost about 18 lbs since admission. Patient has acute severe malnutrition. Continue current diet and oral nutrition supplements. Malnutrition Assessment:  Malnutrition Status:  Severe malnutrition    Context:  Acute Illness     Findings of the 6 clinical characteristics of malnutrition:  Energy Intake:  7 - 50% or less of estimated energy requirements for 5 or more days  Weight Loss:  7 - Greater than 2% over 1 week     Body Fat Loss:  Unable to assess     Muscle Mass Loss:  Unable to assess    Fluid Accumulation:  No significant fluid accumulation Extremities   Strength:  Not Performed    Estimated Daily Nutrient Needs:  Energy (kcal):  9159-6391 kcal (11-14 kcal/kg); Weight Used for Energy Requirements:  Current     Protein (g):  102-109 gm (1.3-1.4 gm/kg); Weight Used for Protein Requirements:  Ideal              Nutrition Related Findings:  Edema: +2 pitting BUE, +1 non-pitting BLE. COVID-19 virus      Wounds:  None       Current Nutrition Therapies:    ADULT ORAL NUTRITION SUPPLEMENT; Breakfast, Dinner; Low Calorie/High Protein Oral Supplement  ADULT DIET;  Dysphagia - Minced and Moist  ADULT ORAL NUTRITION SUPPLEMENT; Breakfast, Lunch; Frozen Oral Supplement    Anthropometric Measures:  · Height: 5' 11\" (180.3 cm)  · Current Body Weight: 247 lb (112 kg)   · Admission Body Weight: 265 lb (120.2 kg)    · Usual Body Weight: 265 lb (120.2 kg)     · Ideal Body Weight: 172 lbs; % Ideal Body Weight 143.6 % · BMI: 34.5  · BMI Categories: Obese Class 3 (BMI 40.0 or greater)       Nutrition Diagnosis:   · Severe malnutrition,In context of acute illness or injury related to inadequate protein-energy intake,impaired respiratory function as evidenced by intake 26-50%,intake 0-25%      Nutrition Interventions:   Food and/or Nutrient Delivery:  Continue Current Diet,Continue Oral Nutrition Supplement  Nutrition Education/Counseling:  Education not indicated   Coordination of Nutrition Care:  Continue to monitor while inpatient    Goals:  PO intakes are greater than 75% at meals       Nutrition Monitoring and Evaluation:   Food/Nutrient Intake Outcomes:  Supplement Intake,Food and Nutrient Intake  Physical Signs/Symptoms Outcomes:  Biochemical Data,Fluid Status or Edema,Skin,Weight     Discharge Planning:    Continue current diet,Continue Oral Nutrition Supplement           Faisal NEVILLEN, RDN, LDN  Lead Clinical Dietitian  RD Office Phone [phone]

## 2022-01-24 NOTE — PROGRESS NOTES
Physical Therapy  Facility/Department: [facility] PROGRESSIVE CARE  Daily Treatment Note  NAME: Sae Mclaughlin  :   MRN: [id number]    Date of Service: 2022   RN Zachary Barillas reports patient is medically stable for therapy treatment this date. Chart reviewed prior to treatment and patient is agreeable for therapy. All lines intact and patient positioned comfortably at end of treatment. All patient needs addressed prior to ending therapy session. Discharge Recommendations:  Pt currently functioning below baseline. Would suggest additional therapy at time of discharge to maximize long term outcomes and prevent re-admission. Please refer to AM-PAC score for current level of function. Patient would benefit from continued therapy after discharge     PT Equipment Recommendations  Equipment Needed:  (CTA pending progression of mobility)    Assessment   Body structures, Functions, Activity limitations: Decreased functional mobility ; Decreased strength;Decreased safe awareness;Decreased cognition;Decreased endurance;Decreased balance;Decreased posture  Assessment: Pt tolerated session poor. Pt w/ significant deficits in bed mobility, strength, and balance this date. Pt most limited by fatigue this date requiring MAX verbal cueing throughout to maintain attention w/ poor return demo. Pt currently requires skilled 2 person assist for safe bed mobility and is a HIGH fall risk. Pt would benefit from continued skilled physical therapy to address these deficits in order to return to PLOF. Prognosis: Good  Decision Making: High Complexity  PT Education: Goals;PT Role;Plan of Care;General Safety; Functional Mobility Training  Patient Education: Pt educated on: purpose of acute PT, importance of continued mobility throughout admission, safe bed mobility techniques, general safety awareness, prevention of sedentary complications, and pursed lip breathing. No evidence of learning.   Pt requires continued reinforcement of education. REQUIRES PT FOLLOW UP: Yes  Activity Tolerance  Activity Tolerance: Patient limited by cognitive status; Patient limited by endurance; Patient limited by fatigue     Patient Diagnosis(es): The primary encounter diagnosis was Altered mental status, unspecified altered mental status type. Diagnoses of Acute respiratory failure with hypercapnia ([address] and COVID-19 were also pertinent to this visit. has a past medical history of Anxiety, Arthritis, Atrial fib/flutter, transient, Bradycardia, COPD (chronic obstructive pulmonary disease) ([address] Dementia ([address] Depression, GERD (gastroesophageal reflux disease), Neuropathy, Parkinson's disease ([address] Pneumonia, and Syncope.   has a past surgical history that includes Breast surgery; Cholecystectomy; back surgery; Cardiac catheterization (2010); Tonsillectomy; Hand surgery (Right); Cardiac pacemaker placement (); ablation of dysrhythmic focus (11-); and transesophageal echocardiogram (11-). Restrictions  Restrictions/Precautions  Restrictions/Precautions: General Precautions,Fall Risk,Up as Tolerated  Required Braces or Orthoses?: No  Position Activity Restriction  Other position/activity restrictions: 1:1 sitter, alarms, Up with assist, marisol lord for RN staff, high flow O2, telemetry, R UE IV  Subjective   General  Response To Previous Treatment: Patient with no complaints from previous session. Family / Caregiver Present: No  Subjective  Subjective: \"Oh that sounds good. \"  General Comment  Comments: RN and pt agreeable to therapy this date. Pt supine in bed upon arrival.  Pt lethargic but cooperative throughout. Orientation  Orientation  Orientation Level: Disoriented to situation;Oriented to person  Cognition   Cognition  Overall Cognitive Status: Exceptions  Arousal/Alertness: Delayed responses to stimuli  Following Commands: Follows one step commands with repetition; Inconsistently follows commands; Follows one step commands with increased time  Attention Span: Difficulty attending to directions; Difficulty dividing attention  Memory: Decreased recall of biographical Information;Decreased recall of recent events;Decreased short term memory  Safety Judgement: Decreased awareness of need for assistance;Decreased awareness of need for safety  Problem Solving: Assistance required to generate solutions;Assistance required to implement solutions;Decreased awareness of errors;Assistance required to correct errors made;Assistance required to identify errors made  Insights: Not aware of deficits  Initiation: Requires cues for all  Sequencing: Requires cues for all  Cognition Comment: Pt requiring MAX verbal and tactile cueing to maintain attention to writer throughout session this date w/ poor return demo. Objective   Bed mobility  Rolling to Left: Maximum assistance;2 Person assistance  Supine to Sit: Maximum assistance;2 Person assistance  Sit to Supine: Maximum assistance;2 Person assistance  Scooting: Maximal assistance;2 Person assistance  Comment: Pt w/ significant difficulty throughout bed mobility this date requiring MAX verbal, tactile, and hand-over-hand cueing for initiation, progression, and sequencing of BLE and trunk throughout w/ poor return demo. Pt also requiring max verbal cueing for use of bedrails throughout for UE assist w/ poor return demo. Pt requiring significantly increased time and effort to perform tasks throughout session this date. Upon sitting EOB, pt requiring MaxA from staff to maintain sitting balance as pt demonstrated significant posterior trunk lean. Pt sat EOB ~8-10 minutes this date to work on trunk control and ther ex EOB. Transfers  Comment: Unsafe to attempt transfers at this time given the high levels of assist required for safe bed mobility and to maintain sitting balance.   Ambulation  Ambulation?: No  More Ambulation?: No  Stairs/Curb  Stairs?: No  Neuromuscular Education  NDT Treatment: Lower extremity; Sitting  Balance  Posture: Poor (sitting EOB)  Sitting - Static: Poor  Sitting - Dynamic: Poor  Comments: NILDA standing balance this date. Exercises  Heelslides: x 5 AAROM  Hip Flexion: Seated marches x 5 AAROM  Knee Long Arc Quad: x 5 AAROM  Ankle Pumps: x 10  Comments: Pt requiring MAX verbal and tactile cueing to maintain attention to task this date w/ poor return demo. AM-PAC Score  AM-PAC Inpatient Mobility Raw Score : 8 (01/24/22 1134)  AM-PAC Inpatient T-Scale Score : 28.52 (01/24/22 1134)  Mobility Inpatient CMS 0-100% Score: 86.62 (01/24/22 1134)  Mobility Inpatient CMS G-Code Modifier : CM (01/24/22 1134)       Functional Outcome Measure-   Single Leg Stance Test:  0 sec. (<5 sec.= fall risk)    Goals  Short term goals  Time Frame for Short term goals: 14 treatments  Short term goal 1: Independent bed mobility/ Transfers Min A x 1/ monitor HR  Short term goal 2: Patient will amb 20 feet with RW and mod assist x 2  Short term goal 3: Good sitting balance/posture/ Fair + standing balance  Short term goal 4: Patient will be provided with cognitive stimulation/ Tolerate 30 min ther act  Patient Goals   Patient goals : No goals stated    Plan    Plan  Times per week: 1-2x/day,5-6 days/week  Current Treatment Recommendations: Strengthening,Balance Training,Transfer Training,Functional Mobility Training,Cognitive Reorientation,Gait Training,Endurance Training,Neuromuscular Re-education,Safety Education & Training,Patient/Caregiver Education & Training  Safety Devices  Type of devices: Call light within reach,Nurse notified,Left in bed,Bed alarm in place,Sitter present  Restraints  Initially in place: No    Therapy Time   Individual Concurrent Group Co-treatment   Time In 0845         Time Out 0914         Minutes 34                Co-treatment with OT warranted secondary to decreased safety and independence requiring 2 skilled therapy professionals to address individual discipline's goals. Isac Diaz, PT

## 2022-01-24 NOTE — PROGRESS NOTES
Pt oxygenation has declined inthe past half hour. Patient sating in the 86% on high flow at 90% 60L. Now after placing patient on bipap at 22/16 rate of 14 at 100%, patient only sating at 81%. Per Dr. Chadd Logan, patient is ok for chest xray. Also, per , call and verify code status with Dr. Billy Park of the [facility] with no intubation. Notified Dr. Pat Franklin of this. Awaiting response back via perfect serve.

## 2022-01-24 NOTE — PROGRESS NOTES
Occupational Therapy  Facility/Department: [facility] PROGRESSIVE CARE  Daily Treatment Note  NAME: Kaur Camacho  :   MRN: [id number]    Date of Service: 2022    Discharge Recommendations:  Patient would benefit from continued therapy after discharge   Pt currently functioning below baseline. Would suggest additional therapy at time of discharge to maximize long term outcomes and prevent re-admission. Please refer to AM-PAC score for current level of function. RN reports patient is medically stable for therapy treatment this date. Chart reviewed prior to treatment and patient is agreeable for therapy. All lines intact and patient positioned comfortably at end of treatment. All patient needs addressed prior to ending therapy session. Assessment   Performance deficits / Impairments: Decreased functional mobility ; Decreased ADL status; Decreased strength;Decreased safe awareness;Decreased cognition;Decreased endurance;Decreased sensation;Decreased balance;Decreased posture  Assessment: Pt lethargic this date and required MAX cuing and stimulation to participate. Pt would benefit from continued skilled OT Services to increase I and safety during functional tasks to reduce caregiver burden as able. Prognosis: Fair  OT Education: OT Role;Plan of Care  Patient Education: participation in therapy, proper bed mobility, OT POC  Barriers to Learning: cognition  REQUIRES OT FOLLOW UP: Yes  Activity Tolerance  Activity Tolerance: Treatment limited secondary to decreased cognition;Patient limited by fatigue;Patient limited by pain  Safety Devices  Safety Devices in place: Yes  Type of devices: Call light within reach;Nurse notified; Patient at risk for falls; All fall risk precautions in place;Gait belt;Left in bed;Bed alarm in place         Patient Diagnosis(es): The primary encounter diagnosis was Altered mental status, unspecified altered mental status type.  Diagnoses of Acute respiratory failure with hypercapnia ([facility] [address] and COVID-19 were also pertinent to this visit. has a past medical history of Anxiety, Arthritis, Atrial fib/flutter, transient, Bradycardia, COPD (chronic obstructive pulmonary disease) ([facility] [facility] Dementia ([facility] [address] Depression, GERD (gastroesophageal reflux disease), Neuropathy, Parkinson's disease ([facility] [address] Pneumonia, and Syncope.   has a past surgical history that includes Breast surgery; Cholecystectomy; back surgery; Cardiac catheterization (2010); Tonsillectomy; Hand surgery (Right); Cardiac pacemaker placement (); ablation of dysrhythmic focus (11-); and transesophageal echocardiogram (11-). Restrictions  Restrictions/Precautions  Restrictions/Precautions: General Precautions,Fall Risk,Up as Tolerated  Required Braces or Orthoses?: No  Position Activity Restriction  Other position/activity restrictions: 1:1 sitter, alarms, Up with assist, marisol lord for RN staff, high flow O2, telemetry, R UE IV  Subjective   General  Chart Reviewed: Yes  Patient assessed for rehabilitation services?: Yes  Response to previous treatment: Patient with no complaints from previous session  Family / Caregiver Present: No  Subjective  Subjective: \"I'm trying! \"  General Comment  Comments: Pt resting in bed, agreeable to OT co-tx with PT. Pt appears somewhat lethargic requiring cues to keep eyes open throughout session. Objective             Balance  Sitting Balance: Maximum assistance (for upright posture sitting EOB)  Standing Balance: Unable to assess(comment) (not appropriate this date)  Standing Balance  Time: Pt sat EOB ~10 min with MAX A and MAX VCs to sit upright.   Functional Mobility  Functional Mobility Comments: Not appriopriate at this time  Bed mobility  Rolling to Left: Maximum assistance;2 Person assistance  Supine to Sit: Maximum assistance;2 Person assistance  Sit to Supine: Maximum assistance;2 Person assistance  Scooting: Maximal assistance;2 Person assistance  Comment: Pt required MAX VC/tactile cues for sequencing and initating bed mob tasks. Pt with difficulty keeping eyes open and required MAX cues and stimulation. Pt with increased fatigue and difficulty following commands. Transfers  Transfer Comments: not appropriate this date, pt unable to keep eyes open with MAX stimulation                       Cognition  Overall Cognitive Status: Exceptions  Arousal/Alertness: Delayed responses to stimuli  Following Commands: Follows one step commands with repetition  Attention Span: Difficulty dividing attention; Difficulty attending to directions  Memory: Decreased recall of biographical Information;Decreased recall of recent events  Safety Judgement: Decreased awareness of need for assistance;Decreased awareness of need for safety  Problem Solving: Assistance required to generate solutions;Assistance required to implement solutions;Decreased awareness of errors;Assistance required to correct errors made;Assistance required to identify errors made  Insights: Decreased awareness of deficits  Initiation: Requires cues for all  Sequencing: Requires cues for all  Cognition Comment: Pt lethargic this date. Type of ROM/Therapeutic Exercise  Comment: While sitting EOB, Pt performed 1 AROM of hand grasp, elbow flexion, and shoulder flexion. Pt unable to perform more with MAX cueing and physical assistance.                     Plan   Plan  Times per week: 3-4x/week  Current Treatment Recommendations: Strengthening,Balance Training,Functional Mobility Training,Endurance Training,Safety Education & Training,Self-Care / ADL,Patient/Caregiver Education & Training,Equipment Evaluation, Education, & procurement,Positioning,Cognitive Reorientation,Cognitive/Perceptual Training    AM-PAC Score        AM-[facility] Inpatient Daily Activity Raw Score: 9 (01/24/22 1057)  AM-PAC Inpatient ADL T-Scale Score : 25.33 (01/24/22 1057)  ADL Inpatient CMS 0-100% Score: 79.59 (01/24/22 1057)  ADL Inpatient CMS G-Code Modifier : CL (01/24/22 1057)    Goals  Short term goals  Time Frame for Short term goals: by discharge, pt will  Short term goal 1: tolerate reassessment of ADL transfers/mob as tolerated  Short term goal 2: demo min A with simple grooming tasks and self feeding following set up and min cues for initiation/sequencing  Short term goal 3: participate in AROM of UEs for inc strength for mob/ADLs  Short term goal 4: demo mod Ax 1 with bed mob with rails/controls  Patient Goals   Patient goals : not stated       Therapy Time   Individual Concurrent Group Co-treatment   Time In 0845         Time Out 0914         Minutes 34                 Co-treatment with PT warranted secondary to decreased safety and independence requiring 2 skilled therapy professionals to address individual discipline's goals. OT addressing sitting balance for increased ADL performance, sitting/activity tolerance, functional reaching, environmental safety/scanning, fall prevention, ability to sequence and follow directions and functional UE strength.       Aidee Robles OTR/L

## 2022-01-24 NOTE — PROGRESS NOTES
Patient noted to have elevated HR this am of 125 upon shift start. Notified Dr. Yakov Bradford of this. Per Dr. Yakov Bradford, monitor, but no change to medications at this time.

## 2022-01-24 NOTE — PROGRESS NOTES
[facility]  Office: [address], DO, Mirela funmilayo, DO, Rashida Sal, DO, Osvaldo Schafer, DO, Blanca Gambino MD, Shashank Villavicencio MD, Janie Friedman MD, Teodoro Viramontes MD, Exie Ahumada, MD, Ramo Maguire MD, Ciera Melchor MD, Juan Alberto Patel, DO, Lucien Muse, DO, Eleonora Pantoja MD,  Bakari Rosado DO, Prem Smith MD, Hue Holguin MD, Yomaira Perez MD, Fany Lau MD, Ovi Pappas MD, Sole Morley MD, Linda Preciado MD, Brii Melara [facility], [facility], [facility], Isak Snyder, [facility], Sherlyn Griffin, CNS, Avery Pantoja, CNP, Benjy Rodríguez, CNP, Martina Vargas, CNP, Seble Fallon, CNP, Byron Pratt, CNP, Jairo Da Silva, PA-C, Kobe Call, DNP, Rick Fuller, [facility], Danis Conroy, CNP, Josué Zhong, CNP, Marnie De La Rosa, CNP, Courtney Collins, CNP, Meet Cat, [facility], Randy Arauz, [facility]    Progress Note    1/24/2022    1:00 PM    Name:   Jef Gutiérrez  MRN:     [id number]     Acct:      [de-identified]   Room:   [address] [place] [address] Day:  12  Admit Date:  1/8/2022  3:33 PM    PCP:   Grecia Fishman MD  Code Status:  DNR-CCA    Subjective:     C/C:   Chief Complaint   Patient presents with   Judeen Merrill Altered Mental Status     Interval History Status: improved. Patient is more alert today, denies any complaints other than intermittent dry cough. Denies any shortness of breath, chest pain, nausea or vomiting, fevers or chills.     Brief History:     Per my BRYAN:  \"Shreyas Mcadams is a 59 y.o. Non- / non  male who presents with Altered Mental Status   and is admitted to the hospital for the management of COVID-19.     Since to the emergency room with altered mental status.  Is confused and not able to provide any history for me.  According to the ER nurse he was brought in by his wife after a fall at home. Antonio Gonzalez states that he has been altered as well.  No additional information available.  He was found to have mild respiratory acidosis and started on BiPAP.  Covid is positive.  He also had JO with BUN of 23 and creatinine of 3.09.  Previous kidney function has been normal.\"    On the 22nd the patient developed increasing hypoxia with increased oxygen demands, required increasing FiO2 of 90% and addition of BiPAP    Review of Systems:     Constitutional:  negative for chills, fevers, sweats  Respiratory:  negative for dyspnea on exertion, shortness of breath, wheezing, positive for dry cough  Cardiovascular:  negative for chest pain, chest pressure/discomfort, lower extremity edema, palpitations  Gastrointestinal:  negative for abdominal pain, constipation, diarrhea, nausea, vomiting  Neurological:  negative for dizziness, headache    Medications: Allergies:     Allergies   Allergen Reactions    Latex     Bee Venom     Prednisone Other (See Comments)     Mood swings       Current Meds:   Scheduled Meds:    piperacillin-tazobactam  4,500 mg IntraVENous Q8H    metoprolol tartrate  25 mg Oral BID    oxyCODONE  60 mg Oral TID    enoxaparin  30 mg SubCUTAneous BID    famotidine  20 mg Oral BID    QUEtiapine  100 mg Oral BID    ipratropium-albuterol  1 ampule Inhalation 4x daily    budesonide  0.5 mg Nebulization BID    gabapentin  100 mg Oral TID    busPIRone  15 mg Oral Daily    DULoxetine  60 mg Oral Daily    finasteride  5 mg Oral Daily    cetirizine  10 mg Oral Daily    rivastigmine  1 patch TransDERmal Daily    [Held by provider] spironolactone  25 mg Oral BID    sodium chloride flush  5-40 mL IntraVENous 2 times per day    Vitamin D  2,000 Units Oral Daily     Continuous Infusions:    sodium chloride Stopped (01/24/22 4175)     PRN Meds: ibuprofen, HYDROcodone-acetaminophen, aluminum & magnesium hydroxide-simethicone, potassium chloride **OR** potassium alternative oral replacement **OR** potassium chloride, albuterol, metoprolol, LORazepam **OR** LORazepam, sodium chloride flush, sodium chloride, ondansetron **OR** ondansetron, magnesium hydroxide, acetaminophen **OR** acetaminophen, guaiFENesin-dextromethorphan    Data:     Past Medical History:   has a past medical history of Anxiety, Arthritis, Atrial fib/flutter, transient, Bradycardia, COPD (chronic obstructive pulmonary disease) ([facility] [address] [address] Dementia ([facility] Depression, GERD (gastroesophageal reflux disease), Neuropathy, Parkinson's disease ([facility] Pneumonia, and Syncope. Social History:   reports that he has quit smoking. His smoking use included cigarettes. He has never used smokeless tobacco. He reports that he does not drink alcohol and does not use drugs. Family History:   Family History   Family history unknown: Yes       Vitals:  /65   Pulse 115   Temp 98 °F (36.7 °C) (Oral)   Resp 20   Ht 5' 11\" (1.803 m)   Wt 247 lb 9.2 oz (112.3 kg)   SpO2 (!) 89%   BMI 34.53 kg/m²   Temp (24hrs), Av.8 °F (37.1 °C), Min:97.7 °F (36.5 °C), Max:100 °F (37.8 °C)    No results for input(s): POCGLU in the last 72 hours. I/O (24Hr):     Intake/Output Summary (Last 24 hours) at 2022 1300  Last data filed at 2022 1209  Gross per 24 hour   Intake 2037.94 ml   Output --   Net 2037.94 ml       Labs:  Hematology:  Recent Labs     22  0100 22  1438 22  0209 22  0506   WBC 16.9*  --  9.9 8.6   RBC 4.76  --  4.60 4.24   HGB 12.9*  --  12.3* 11.5*   HCT 41.5  --  41.7 37.6*   MCV 87.2  --  90.7 88.7   MCH 27.1  --  26.7 27.1   MCHC 31.1  --  29.5 30.6   RDW 13.9  --  14.2 14.2     --  259 279   MPV 9.6  --  10.3 9.5   DDIMER  --  2.32*  --   --      Chemistry:  Recent Labs     22  0617 22  1438 22  1850 22  0508 22  0506     --   --  142 143   K 4.6  --   --  4.1 4.0     --   --  103 102   CO2 22  --   --  27 29   GLUCOSE 124*  --   --  146* 117*   BUN 40*  --   --  39* 35*   CREATININE 1.66*  --   --  1.49* 1.24*   ANIONGAP 15  --   --  12 12   LABGLOM 42*  --   --  48* 59* GFRAA 51*  --   --  58* >60   CALCIUM 8.9  --   --  8.6 8.9   PROBNP  --  894*  --   --   --    TROPHS  --  30* 31*  --   --    No results for input(s): PROT, LABALBU, LABA1C, I7TKUZD, U5EKVJG, FT4, TSH, AST, ALT, LDH, GGT, ALKPHOS, LABGGT, BILITOT, BILIDIR, AMMONIA, AMYLASE, LIPASE, LACTATE, CHOL, HDL, LDLCHOLESTEROL, CHOLHDLRATIO, TRIG, VLDL, YEF33ND, PHENYTOIN, PHENYF, URICACID, POCGLU in the last 72 hours. ABG:  Lab Results   Component Value Date    POCPH 7.388 01/22/2022    POCPCO2 48.5 01/22/2022    POCPO2 50.0 01/22/2022    POCHCO3 29.2 01/22/2022    NBEA NOT REPORTED 01/22/2022    PBEA 3 01/22/2022    YTP5DDU NOT REPORTED 01/22/2022    IZEQ6ILJ 84 01/22/2022    FIO2 100.0 01/22/2022     Lab Results   Component Value Date/Time    SPECIAL RT HAND 1ML 01/24/2022 02:08 AM    SPECIAL LT AC 8ML 01/24/2022 02:08 AM     Lab Results   Component Value Date/Time    CULTURE NO GROWTH <24 HRS 01/24/2022 02:08 AM    CULTURE NO GROWTH <24 HRS 01/24/2022 02:08 AM       Radiology:  XR CHEST PORTABLE    Result Date: 1/22/2022  Patchy bilateral consolidative opacities right greater than left mildly worsened from the previous exam compatible with multifocal pneumonia. XR CHEST PORTABLE    Result Date: 1/21/2022  No significant change in bilateral airspace disease. XR CHEST PORTABLE    Result Date: 1/20/2022  Bibasilar atelectasis with probable right lower lobe infiltrate. Small effusions right-sided PICC tip position stable. XR CHEST PORTABLE    Result Date: 1/17/2022  Persistent opacities at the lung bases with some clearing of right perihilar airspace disease.        Physical Examination:        General appearance:  alert, cooperative and no distress, much more alert today  Mental Status:  oriented to person, place and time and normal affect  Lungs:  clear to auscultation bilaterally, normal effort, diminished throughout, high flow oxygen use at 60 L, 90% FiO2  Heart:  regular rate and rhythm, no murmur  Abdomen:  soft, nontender, nondistended, normal bowel sounds, no masses, hepatomegaly, splenomegaly  Extremities:  no edema, redness, tenderness in the calves  Skin:  no gross lesions, rashes, induration    Assessment:        Hospital Problems           Last Modified POA    * (Principal) Pneumonia due to COVID-19 virus 1/9/2022 Yes    AMS (altered mental status) 1/9/2022 Yes    COPD without exacerbation ([address] 1/9/2022 Yes    A-fib ([address] 1/9/2022 Yes    Gastroesophageal reflux disease without esophagitis 1/9/2022 Yes    Neuropathy 1/9/2022 Yes    JO (acute kidney injury) ([address] 1/9/2022 Yes    Elevated troponin 1/9/2022 Yes    Encephalopathy due to COVID-19 virus 1/18/2022 Yes    Acute respiratory failure with hypercapnia ([address] 1/13/2022 Yes          Plan:        1. Continue Zosyn pending culture, concern for possible aspiration pneumonia  2. Cardiac medications as ordered  3. Monitor neurological status, has improved over the last 48 hours  4. GI and DVT prophylaxis  5. High flow oxygen/BiPAP to maintain oxygen saturation above 90  6. Aerosol protocol  7. Monitor renal function, avoid nephrotoxic agents  8. PT and OT  9. We will likely need LTAC at time of discharge  10.  Soft diet, monitor for signs or symptoms of aspiration    Aleta Chun, DO  1/24/2022  1:00 PM

## 2022-01-24 NOTE — PLAN OF CARE
Nutrition Problem #1: Severe malnutrition,In context of acute illness or injury  Intervention: Food and/or Nutrient Delivery: Continue Current Diet,Continue Oral Nutrition Supplement  Nutritional Goals: PO intakes are greater than 75% at meals

## 2022-01-24 NOTE — PROGRESS NOTES
Section of Cardiology  Progress Note      Date:  1/24/2022  Patient: Adriane Casey  Admission:  1/8/2022  3:33 PM  Admit DX: Acute respiratory failure with hypercapnia (HCC) [J96.02]  Altered mental status, unspecified altered mental status type [R41.82]  AMS (altered mental status) [R41.82]  COVID-19 [U07.1]  Age:  59 y.o., 1960     LOS: 16 days     Reason for evaluation:   arrhythmia      SUBJECTIVE:     Notes and labs reviewed. He was tachycardic overnight. OBJECTIVE:    Telemetry: Sinus  /74   Pulse 123   Temp 97.7 °F (36.5 °C) (Oral)   Resp 22   Ht 5' 11\" (1.803 m)   Wt 247 lb 9.2 oz (112.3 kg)   SpO2 93%   BMI 34.53 kg/m²     Intake/Output Summary (Last 24 hours) at 1/24/2022 1015  Last data filed at 1/24/2022 4761  Gross per 24 hour   Intake 1846.04 ml   Output --   Net 1846.04 ml       EXAM:   Physical exam was deferred due to Covid 19 isolation status and to minimize the spent of infection. Current Inpatient Medications:   piperacillin-tazobactam  4,500 mg IntraVENous Q8H    metoprolol tartrate  25 mg Oral BID    oxyCODONE  60 mg Oral TID    enoxaparin  30 mg SubCUTAneous BID    famotidine  20 mg Oral BID    QUEtiapine  100 mg Oral BID    ipratropium-albuterol  1 ampule Inhalation 4x daily    budesonide  0.5 mg Nebulization BID    gabapentin  100 mg Oral TID    busPIRone  15 mg Oral Daily    DULoxetine  60 mg Oral Daily    finasteride  5 mg Oral Daily    cetirizine  10 mg Oral Daily    rivastigmine  1 patch TransDERmal Daily    [Held by provider] spironolactone  25 mg Oral BID    sodium chloride flush  5-40 mL IntraVENous 2 times per day    Vitamin D  2,000 Units Oral Daily       IV Infusions (if any):   sodium chloride 25 mL (01/22/22 1446)       Diagnostics:   EKG: . NSR with occasional PVCs    Labs:   CBC:   Recent Labs     01/24/22  0209 01/24/22  0506   WBC 9.9 8.6   HGB 12.3* 11.5*   HCT 41.7 37.6*    279     BMP:   Recent Labs     01/23/22  050 01/24/22  0506    143   K 4.1 4.0   CO2 27 29   BUN 39* 35*   CREATININE 1.49* 1.24*   LABGLOM 48* 59*   GLUCOSE 146* 117*     No results found for: BNP  PT/INR: No results for input(s): PROTIME, INR in the last 72 hours. APTT:No results for input(s): APTT in the last 72 hours. CARDIAC ENZYMES:  Recent Labs     01/22/22  1438 01/22/22  1850   TROPONINT NOT REPORTED NOT REPORTED     FASTING LIPID PANEL:  Lab Results   Component Value Date    HDL 27 09/26/2020    TRIG 186 09/26/2020     LIVER PROFILE:No results for input(s): AST, ALT, LABALBU in the last 72 hours. ASSESSMENT:  1.  Short nonsustained ventricular tachycardia  2.  History of atrial fibrillation, status post ablation in 2014  3.  Permanent pacemaker  4.  Acute respiratory failure  5.  COVID-19 pneumonia  6.  Acute kidney injury, resolved now returned  7.  Elevated troponin, could be related to acute kidney injury and hypoxemia  8.  Altered mental status  9.  Elevated D-dimer  10.  Elevated cardiac enzymes  11.  Frequent PVCs in singles    PLAN:  1. Maintain Mg2+ >2 and K+ >4.    2. Continue beta blocker therapy at current dose. 3. Continue treatment of COVID 19 as stressor which is exacerbating premature beats  4. Continue supportive therapy      Please see orders.  Discussed with pt/nurse    Jyoti Baugh MD, MD

## 2022-01-25 NOTE — FLOWSHEET NOTE
Spouse inquires about sacrament of the sick for patient. Willian Dexter speaks to spouse via the phone. Explains patient about the sacrament to spouse. States patient was anointed   Spouse states about the severity of patient.  shared in presence, listening, prayers. Follow up as needed. 01/25/22 4608   Encounter Summary   Services provided to: Family   Referral/Consult From: Other    Support System Spouse   Continue Visiting   (1-25-22)   Complexity of Encounter Moderate   Length of Encounter 30 minutes   Spiritual Assessment Completed Yes   Routine   Type Follow up   Grief and Life Adjustment   Type End of life   Assessment Calm; Approachable   Intervention Active listening;Explored feelings, thoughts, concerns;Prayer;Sustaining presence/ Ministry of presence; Discussed belief system/Mormonism practices/quincy;Discussed illness/injury and it's impact   Outcome Expressed gratitude;Receptive;Engaged in conversation;Expressed feelings/needs/concerns

## 2022-01-25 NOTE — PROGRESS NOTES
[facility]  Office: [address], DO, Taylor Patino, DO, Yojana Lucy, DO, Anup Parsonserster Blood, DO, Marylene Freiberg, MD, Jazmyne Medellin MD, Nuvia Hardin MD, Aline Dominguez MD, Eboni Barboza MD, Jose Hummel MD, Mitzy Carson MD, Joey Aranda, DO, Brittany Li, DO, Rosas Farris MD,  Orien Dakin, DO, Lisha Junior MD, Alejandra Nielsen MD, Denisa lBunt MD, Heather Waldron MD, Nenita Akers MD, Sandi Melara MD, Nj Sumner MD, Jah Mills, [facility], [facility] Rollyava, CNP, Ana Jewell, [facility], Raghu Lebron, CNS, Chiquis Soto, CNP, Shawna Arzola, CNP, Tapan Phillip, CNP, Francesca Muller, CNP, Alex Crockett, CNP, Farheen Aguilar PA-C, Dmitry Gomes, [facility], Scott George, [facility], Idalia Silva, CNP, Мария Fonseca, CNP, Goldie Brown, CNP, Efraín Salmon, CNP, Elaine Carl, CNP, Shmuel Coates, [facility]    Progress Note    1/25/2022    2:53 PM    Name:   Mili Alonzo  MRN:     [id number]     [place]:      [de-identified]   Room:   [address] [place] [address] Day:  16  Admit Date:  1/8/2022  3:33 PM    PCP:   Wolfgang Eastman MD  Code Status:  DNR-CCA    Subjective:     C/C:   Chief Complaint   Patient presents with   Fran Layer Altered Mental Status     Interval History Status: not changed. Remains confused  On hfnc along with nrb mask on top of that    Brief History:     Per my BRYAN:  Lorrie Whitney is a 59 y.o. Non- / non  male who presents with Altered Mental Status   and is admitted to the hospital for the management of COVID-19.     Since to the emergency room with altered mental status. Is confused and not able to provide any history for me. According to the ER nurse he was brought in by his wife after a fall at home. She states that he has been altered as well. No additional information available. He was found to have mild respiratory acidosis and started on BiPAP. Covid is positive.   He also had JO with BUN of 23 and creatinine of 3.09. Previous kidney function has been normal.\"    Review of Systems:     unobtainable      Medications: Allergies: Allergies   Allergen Reactions    Latex     Bee Venom     Prednisone Other (See Comments)     Mood swings       Current Meds:   Scheduled Meds:    furosemide  40 mg IntraVENous Daily    piperacillin-tazobactam  4,500 mg IntraVENous Q8H    metoprolol tartrate  25 mg Oral BID    oxyCODONE  60 mg Oral TID    enoxaparin  30 mg SubCUTAneous BID    famotidine  20 mg Oral BID    QUEtiapine  100 mg Oral BID    ipratropium-albuterol  1 ampule Inhalation 4x daily    budesonide  0.5 mg Nebulization BID    gabapentin  100 mg Oral TID    busPIRone  15 mg Oral Daily    DULoxetine  60 mg Oral Daily    finasteride  5 mg Oral Daily    cetirizine  10 mg Oral Daily    rivastigmine  1 patch TransDERmal Daily    [Held by provider] spironolactone  25 mg Oral BID    sodium chloride flush  5-40 mL IntraVENous 2 times per day    Vitamin D  2,000 Units Oral Daily     Continuous Infusions:    sodium chloride 25 mL (01/24/22 8755)     PRN Meds: ibuprofen, HYDROcodone-acetaminophen, aluminum & magnesium hydroxide-simethicone, potassium chloride **OR** potassium alternative oral replacement **OR** potassium chloride, albuterol, metoprolol, LORazepam **OR** LORazepam, sodium chloride flush, sodium chloride, ondansetron **OR** ondansetron, magnesium hydroxide, acetaminophen **OR** acetaminophen, guaiFENesin-dextromethorphan    Data:     Past Medical History:   has a past medical history of Anxiety, Arthritis, Atrial fib/flutter, transient, Bradycardia, COPD (chronic obstructive pulmonary disease) ([facility] Dementia ([facility] Depression, GERD (gastroesophageal reflux disease), Neuropathy, Parkinson's disease ([facility] Pneumonia, and Syncope. Social History:   reports that he has quit smoking. His smoking use included cigarettes.  He has never used smokeless tobacco. He reports that he does not drink alcohol and does not use drugs. Family History:   Family History   Family history unknown: Yes       Vitals:  BP 95/65   Pulse 106   Temp 98.2 °F (36.8 °C) (Axillary)   Resp 28   Ht 5' 11\" (1.803 m)   Wt 247 lb 9.2 oz (112.3 kg)   SpO2 92%   BMI 34.53 kg/m²   Temp (24hrs), Av.1 °F (37.3 °C), Min:98.2 °F (36.8 °C), Max:101.1 °F (38.4 °C)    No results for input(s): POCGLU in the last 72 hours. I/O (24Hr): Intake/Output Summary (Last 24 hours) at 2022 1453  Last data filed at 2022 1135  Gross per 24 hour   Intake 900 ml   Output 120 ml   Net 780 ml       Labs:  Hematology:  Recent Labs     22  0209 22  0506 22  0546   WBC 9.9 8.6 12.0*   RBC 4.60 4.24 4.75   HGB 12.3* 11.5* 12.7*   HCT 41.7 37.6* 41.7   MCV 90.7 88.7 87.8   MCH 26.7 27.1 26.7   MCHC 29.5 30.6 30.5   RDW 14.2 14.2 14.1    279 300   MPV 10.3 9.5 9.8     Chemistry:  Recent Labs     22  1850 22  0508 22  0506 22  0546   NA  --  142 143 143   K  --  4.1 4.0 3.7   CL  --  103 102 100   CO2  --  27 29 28   GLUCOSE  --  146* 117* 133*   BUN  --  39* 35* 36*   CREATININE  --  1.49* 1.24* 1.45*   ANIONGAP  --  12 12 15   LABGLOM  --  48* 59* 49*   GFRAA  --  58* >60 60*   CALCIUM  --  8.6 8.9 8.8   TROPHS 31*  --   --   --      No results for input(s): PROT, LABALBU, LABA1C, C7NEVUH, X2MAZSI, FT4, TSH, AST, ALT, LDH, GGT, ALKPHOS, LABGGT, BILITOT, BILIDIR, AMMONIA, AMYLASE, LIPASE, LACTATE, CHOL, HDL, LDLCHOLESTEROL, CHOLHDLRATIO, TRIG, VLDL, QZY11JD, PHENYTOIN, PHENYF, URICACID, POCGLU in the last 72 hours.   ABG:  Lab Results   Component Value Date    POCPH 7.388 2022    POCPCO2 48.5 2022    POCPO2 50.0 2022    POCHCO3 29.2 2022    NBEA NOT REPORTED 2022    PBEA 3 2022    TQL5CMC NOT REPORTED 2022    XTZR0BDX 84 2022    FIO2 100.0 2022     Lab Results   Component Value Date/Time    SPECIAL RT HAND 1ML 2022 02:08 AM SPECIAL LT AC 8ML 01/24/2022 02:08 AM     Lab Results   Component Value Date/Time    CULTURE NO GROWTH 1 DAY 01/24/2022 02:08 AM    CULTURE NO GROWTH 1 DAY 01/24/2022 02:08 AM       Radiology:  XR ABDOMEN (KUB) (SINGLE AP VIEW)    Result Date: 1/16/2022  1. Unremarkable bowel gas pattern. XR ABDOMEN (KUB) (SINGLE AP VIEW)    Result Date: 1/11/2022  Nonobstructive bowel gas pattern. US LIVER    Result Date: 1/14/2022  Significantly limited study due to patient difficulty with cooperation during the imaging examination. Possible hepatic steatosis. Gallbladder, pancreas and common duct not visualized. RECOMMENDATIONS: Unavailable     XR CHEST PORTABLE    Result Date: 1/15/2022  Bronchial wall thickening with reticulonodular opacities in the right lung the left lung base, similar to prior. This may reflect infectious/inflammatory airways process it is not the typical appearance of atypical viral pneumonitis. XR CHEST PORTABLE    Result Date: 1/14/2022  No significant interval change, when compared to the previous study performed 01/12/2022. XR CHEST PORTABLE    Result Date: 1/12/2022  Mild interval increase in right lower lung field opacities, when compared to the previous study performed 1 day earlier. XR CHEST PORTABLE    Result Date: 1/11/2022  No significant interval change, when compared to the previous study performed 01/08/2022, as described above. US RETROPERITONEAL COMPLETE    Result Date: 1/10/2022  Nonvisualization of the right kidney. Limited exam as described.   Otherwise unremarkable exam. RECOMMENDATIONS: Unavailable       Physical Examination:        General appearance:  Awake, cooperative and no distress  Mental Status:  oriented to person and normal affect  Lungs:  clear to auscultation bilaterally, normal effort on hfnc  Heart:  regular rate and rhythm, no murmur  Abdomen:  soft, nontender, nondistended, normal bowel sounds, no masses, hepatomegaly, splenomegaly  Extremities:  no edema, redness, tenderness in the calves  Skin:  no gross lesions, rashes, induration    Assessment:        Hospital Problems           Last Modified POA    * (Principal) Pneumonia due to COVID-19 virus 1/9/2022 Yes    AMS (altered mental status) 1/9/2022 Yes    COPD without exacerbation ([address] 1/9/2022 Yes    A-fib ([address] 1/9/2022 Yes    Gastroesophageal reflux disease without esophagitis 1/9/2022 Yes    Neuropathy 1/9/2022 Yes    JO (acute kidney injury) ([address] 1/9/2022 Yes    Elevated troponin 1/9/2022 Yes    Encephalopathy due to COVID-19 virus 1/18/2022 Yes    Acute respiratory failure with hypercapnia ([address] 1/13/2022 Yes    Severe malnutrition ([address] 1/24/2022 Yes          Plan:        1. Wean hfnc/bipap as geeta; currently on hfnc 90%/60L-as he has been since I last saw him over a week ago  2. completed decadron per protocol  3. Monitor mental status  4. On zosyn for concerns of possible aspiration pneumonia  5.  D/w wife Art Gupta is considering withdrawal of support and comfort measures only; she does want him to have last rites as he has previously requested    Priscella Erb Blood, DO  1/25/2022  2:53 PM

## 2022-01-25 NOTE — PROGRESS NOTES
Updated Yaquelin Hollow on increased oxygen demands and change in condition via phone. Emily verbalized understanding.

## 2022-01-25 NOTE — PROGRESS NOTES
Section of Cardiology  Progress Note      Date:  1/25/2022  Patient: Camilo Arana  Admission:  1/8/2022  3:33 PM  Admit DX: Acute respiratory failure with hypercapnia (HCC) [J96.02]  Altered mental status, unspecified altered mental status type [R41.82]  AMS (altered mental status) [R41.82]  COVID-19 [U07.1]  Age:  59 y.o., 1960     LOS: 17 days     Reason for evaluation:   arrhythmia      SUBJECTIVE:     Notes and labs reviewed. He continues to have worsening shortness of breath. He received lasix yesterday. Now on bipap. Continues to be tachycardic. OBJECTIVE:    Telemetry: Sinus  /64   Pulse 107   Temp 99.5 °F (37.5 °C) (Axillary)   Resp 18   Ht 5' 11\" (1.803 m)   Wt 247 lb 9.2 oz (112.3 kg)   SpO2 (!) 89%   BMI 34.53 kg/m²     Intake/Output Summary (Last 24 hours) at 1/25/2022 0839  Last data filed at 1/25/2022 7178  Gross per 24 hour   Intake 1241.9 ml   Output --   Net 1241.9 ml       EXAM:   Physical exam was deferred due to Covid 19 isolation status and to minimize the spent of infection. Current Inpatient Medications:   furosemide  40 mg IntraVENous Daily    piperacillin-tazobactam  4,500 mg IntraVENous Q8H    metoprolol tartrate  25 mg Oral BID    oxyCODONE  60 mg Oral TID    enoxaparin  30 mg SubCUTAneous BID    famotidine  20 mg Oral BID    QUEtiapine  100 mg Oral BID    ipratropium-albuterol  1 ampule Inhalation 4x daily    budesonide  0.5 mg Nebulization BID    gabapentin  100 mg Oral TID    busPIRone  15 mg Oral Daily    DULoxetine  60 mg Oral Daily    finasteride  5 mg Oral Daily    cetirizine  10 mg Oral Daily    rivastigmine  1 patch TransDERmal Daily    [Held by provider] spironolactone  25 mg Oral BID    sodium chloride flush  5-40 mL IntraVENous 2 times per day    Vitamin D  2,000 Units Oral Daily       IV Infusions (if any):   sodium chloride 25 mL (01/24/22 1439)       Diagnostics:   EKG: Sinus tachycardia.     Labs:   CBC:   Recent Labs 01/24/22  0506 01/25/22  0546   WBC 8.6 12.0*   HGB 11.5* 12.7*   HCT 37.6* 41.7    300     BMP:   Recent Labs     01/24/22  0506 01/25/22  0546    143   K 4.0 3.7   CO2 29 28   BUN 35* 36*   CREATININE 1.24* 1.45*   LABGLOM 59* 49*   GLUCOSE 117* 133*     No results found for: BNP  PT/INR: No results for input(s): PROTIME, INR in the last 72 hours. APTT:No results for input(s): APTT in the last 72 hours. CARDIAC ENZYMES:  Recent Labs     01/22/22  1438 01/22/22  1850   TROPONINT NOT REPORTED NOT REPORTED     FASTING LIPID PANEL:  Lab Results   Component Value Date    HDL 27 09/26/2020    TRIG 186 09/26/2020     LIVER PROFILE:No results for input(s): AST, ALT, LABALBU in the last 72 hours. ASSESSMENT:  1.  Short nonsustained ventricular tachycardia  2.  History of atrial fibrillation, status post ablation in 2014  3.  Permanent pacemaker  4.  Acute respiratory failure  5.  COVID-19 pneumonia  6.  Acute kidney injury, resolved now returned  7.  Elevated troponin, could be related to acute kidney injury and hypoxemia  8.  Altered mental status  9.  Elevated D-dimer  10.  Elevated cardiac enzymes  11.  Frequent PVCs in singles    PLAN:  1. Maintain Mg2+ >2 and K+ >4.    2. Continue beta blocker therapy . Tachycardia is due to covid pneumonia and will not respond to bblockers. 3. Continue treatment of COVID 19 as stressor which is exacerbating premature beats  4. Continue supportive therapy     Please see orders.  Discussed with pt/nurse    Afia Parrish MD, MD

## 2022-01-25 NOTE — PLAN OF CARE
Problem: Falls - Risk of:  Goal: Will remain free from falls  Description: Will remain free from falls  Outcome: Ongoing       Patient has remained free from falls this shift. Patient is alert and oriented times two. Bed to lowest position with door open. Patient care items and call light in reach. Patient uses call light appropriately for assist. Will continue to monitor. Please see fall assessment.

## 2022-01-25 NOTE — PROGRESS NOTES
Updated Dr. Smith Resides of chest xray results and that Dr. Cherrie Thornton ordered lasix x1 via perfect serve. No new orders per Dr. Smith Resides.

## 2022-01-25 NOTE — PROGRESS NOTES
Pulmonary Critical Care Progress Note  Joel Luong CNP/Alejandro Diana MD     Patient seen for the follow up of acute hypoxic respiratory insufficiency, Pneumonia due to COVID-19 virus     Subjective:  He is resting in bed, one-to-one sitter at bedside. He used BiPAP overnight. He remains on high flow, currently on 90% / 60 L with nonrebreather over it, saturating 88%. He says the shortness of breath is mild. He has occasional cough, mostly dry. No chest pain. Examination:  Vitals: /64   Pulse 107   Temp 99.5 °F (37.5 °C) (Axillary)   Resp 18   Ht 5' 11\" (1.803 m)   Wt 247 lb 9.2 oz (112.3 kg)   SpO2 (!) 89%   BMI 34.53 kg/m²   General appearance:   Awake follows commands, slow to respond  Neck: No JVD  Lungs:  Decreased breath sounds no crackles or wheezing  Heart: regular rate and rhythm, S1, S2 normal, no gallop  Abdomen: Soft, non tender, + BS  Extremities: no cyanosis or clubbing.  No significant edema    LABs:  BMP:   Recent Labs     01/24/22  0506 01/25/22  0546    143   K 4.0 3.7   CO2 29 28   BUN 35* 36*   CREATININE 1.24* 1.45*   LABGLOM 59* 49*   GLUCOSE 117* 133*     ABG:  Lab Results   Component Value Date    RLD9YHD NOT REPORTED 01/22/2022    FIO2 100.0 01/22/2022       Lab Results   Component Value Date    POCPH 7.388 01/22/2022    POCPCO2 48.5 01/22/2022    POCPO2 50.0 01/22/2022    POCHCO3 29.2 01/22/2022    NBEA NOT REPORTED 01/22/2022    PBEA 3 01/22/2022    HJQ2HSE NOT REPORTED 01/22/2022    TXEA5XIZ 84 01/22/2022    FIO2 100.0 01/22/2022       Radiology:  X-ray chest: 1/24/2022      Impression:  · Acute hypoxic respiratory  failure requiring BiPAP and high flow  · COVID-19  Pneumonia  · Altered mental status/encephalopathy  · Acute kidney injury  · Suspected obstructive sleep apnea/Obesity  · A. fib, anxiety, arthritis, GERD  · Elevated D-dimer  · Elevated liver function    Recommendations:  · Oxygen via high flow to keep saturation above 90%, currently on 60 L / 90% FiO2  · BiPAP while asleep and as needed  · Incentive spirometry every hour while wake  · DuoNeb by nebulizer 4 times daily  · Pulmicort 0.5 q.12 hours by nebulizer  · Zosyn  · Diuresis with IV Lasix, monitor renal function  · Seroquel 100 mg twice daily  · Monitor mental status  · Cardiology following  · Diet as tolerated, encourage oral intake  · Prone as tolerated  · Status post course of Decadron   · PT/OT  · Discussed with RN  · DVT prophylaxis on Lovenox 30 mg twice daily  · GI prophylaxis, Pepcid  · DNR CCA  · We will follow with you    Electronically signed by     PATRICK Portillo CNP on 1/25/2022 at 10:04 AM  Pulmonary Critical Care and Sleep Medicine,  [facility] AT [place]: [phone]

## 2022-01-25 NOTE — CARE COORDINATION
Discharge planning    Patient chart reviewed. Patient condition changed. Now on bipap    COVID   bipap   Testing 1/8     [facility] is following    1100  Discussed in report. When patient is not on BIPAP using HF 90FIo2 and non rebreather at same time. Discussed with RN that patient would benefit from palliative care conversation. RN to discuss with attending.

## 2022-01-26 NOTE — PROGRESS NOTES
Pt resting in bed without distress, pt remains on 90% fi02 heated high flow and non re breather mask on top, pulse ox 87-92%, pt desats with any movement, eating or exertion, palliative care consult tomorrow

## 2022-01-26 NOTE — PROGRESS NOTES
Section of Cardiology  Progress Note      Date:  1/26/2022  Patient: Justina Short  Admission:  1/8/2022  3:33 PM  Admit DX: Acute respiratory failure with hypercapnia (HCC) [J96.02]  Altered mental status, unspecified altered mental status type [R41.82]  AMS (altered mental status) [R41.82]  COVID-19 [U07.1]  Age:  59 y.o., 1960     LOS: 18 days     Reason for evaluation:   arrhythmia      SUBJECTIVE:     Notes and labs reviewed. He continues to have worsening shortness of breath. He is on bipap today. OBJECTIVE:    Telemetry: Sinus  /78   Pulse 106   Temp 98.2 °F (36.8 °C) (Axillary)   Resp 25   Ht 5' 11\" (1.803 m)   Wt 249 lb 1.9 oz (113 kg)   SpO2 90%   BMI 34.75 kg/m²     Intake/Output Summary (Last 24 hours) at 1/26/2022 1708  Last data filed at 1/26/2022 1457  Gross per 24 hour   Intake 300 ml   Output 1200 ml   Net -900 ml       EXAM:   Physical exam was deferred due to Covid 19 isolation status and to minimize the spent of infection. Current Inpatient Medications:   furosemide  40 mg IntraVENous Daily    piperacillin-tazobactam  4,500 mg IntraVENous Q8H    metoprolol tartrate  25 mg Oral BID    oxyCODONE  60 mg Oral TID    enoxaparin  30 mg SubCUTAneous BID    famotidine  20 mg Oral BID    QUEtiapine  100 mg Oral BID    ipratropium-albuterol  1 ampule Inhalation 4x daily    budesonide  0.5 mg Nebulization BID    gabapentin  100 mg Oral TID    busPIRone  15 mg Oral Daily    DULoxetine  60 mg Oral Daily    finasteride  5 mg Oral Daily    cetirizine  10 mg Oral Daily    rivastigmine  1 patch TransDERmal Daily    [Held by provider] spironolactone  25 mg Oral BID    sodium chloride flush  5-40 mL IntraVENous 2 times per day    Vitamin D  2,000 Units Oral Daily       IV Infusions (if any):   sodium chloride 25 mL (01/24/22 1439)       Diagnostics:   EKG: Sinus tachycardia.     Labs:   CBC:   Recent Labs     01/24/22  0506 01/25/22  0546   WBC 8.6 12.0*   HGB 11.5* 12.7*   HCT 37.6* 41.7    300     BMP:   Recent Labs     01/25/22  0546 01/26/22  0606    141   K 3.7 3.3*   CO2 28 30   BUN 36* 42*   CREATININE 1.45* 1.25*   LABGLOM 49* 58*   GLUCOSE 133* 140*     No results found for: BNP  PT/INR: No results for input(s): PROTIME, INR in the last 72 hours. APTT:No results for input(s): APTT in the last 72 hours. CARDIAC ENZYMES:  No results for input(s): CKTOTAL, CKMB, CKMBINDEX, TROPONINT in the last 72 hours. FASTING LIPID PANEL:  Lab Results   Component Value Date    HDL 27 09/26/2020    TRIG 186 09/26/2020     LIVER PROFILE:No results for input(s): AST, ALT, LABALBU in the last 72 hours. ASSESSMENT:  1.  Short nonsustained ventricular tachycardia  2.  History of atrial fibrillation, status post ablation in 2014  3.  Permanent pacemaker  4.  Acute respiratory failure  5.  COVID-19 pneumonia  6.  Acute kidney injury, resolved now returned  8.  Altered mental status  9.  Elevated D-dimer  10.  Elevated cardiac enzymes  PLAN:  1. Maintain Mg2+ >2 and K+ >4.    2. Continue beta blocker therapy . Tachycardia is due to covid pneumonia and will not respond to bblockers. 3. Continue supportive therapy. Patient has poor prognosis     Please see orders.  Discussed with nurse    Jv Wilcox MD, MD

## 2022-01-26 NOTE — PROGRESS NOTES
Pt resting in bed without distress, pt remains on heated high flow 90% fi02  60L with non rebreather mask over top, pulse ox 85-95% and pt will desat to 84% when non re breather removed, 1:1 continues, pt DNRCCA with no intubation, Dr. Schafer aware and will call wife for update today, will continue to monitor

## 2022-01-26 NOTE — PROGRESS NOTES
Patient refusing to prone or lay on his side at this time. Education provided. Patient attempted IS and asked to stop due to feeling dizzy.

## 2022-01-26 NOTE — PROGRESS NOTES
[facility]  Office: [address], DO, Irwin Chambers, DO, Jacob Newman, DO, Abraham Banuelos Blood, DO, Rocky Perez MD, Hemalatha Collazo MD, Mariama Worrell MD, Ryan Mireles MD, Cheryl Desouza MD, Amarjit Goins MD, Christine Hare MD, Prem Siddiqui, DO, Maciej Mirza, DO, Iban Mcmanus MD,  Shelley Lambert, DO, Kisha Severino MD, Valerie Li MD, Kris Oleary MD, Maryann Marshall MD, Luther Love MD, Tejas Jacob MD, Triny Vazquez MD, Chris Ramos, Nika Conroy, CNP, Arturo Lane, CNP, Jerome Marlow, CNS, Virginia Daniels, CNP, Kamille Vallejo, CNP, Sarah Arana, CNP, Kym Cifuentes, CNP, Teddy Vilchis, CNP, Nichole Davis PA-C, Don Baird, DNP, Gerson Allison, DNP, Shari Rodriguez, CNP, Rosario Avila, CNP, Vickie James, CNP, Anjali Ecsobar, CNP, Claudette David, CNP, Andrea [facility]   Nighttime In-House Nurse Practitioner      1/26/2022    3:14 AM    Name:   Elsi Clark  MRN:     [id number]     Acct:      [de-identified]   Room:   [address] [place] [address] Day:  25  Admit Date:  1/8/2022  3:33 PM    PCP:   Bettye Vann MD  Code Status:  DNR-CCA    Called to the floor by staff to evaluate Elsi Clark in [address] [place] [address] at 3:14 AM with complaint of SpO2 dropping to low 80s and patient placed on bipap. Once on bipap, SpO2 stayed low. Patient placed back on HFNC 60L/ 90% FiO2 and NRB placed on patient in addition to HFNC. Patient appears to be in no distress, mentation is intact and he denies breathing difficulty. No obvious accessory muscle use. SpO2 currently 88%. Code status noted to be DNRCC-A. no intubation. Assessment/Plan:     Hypoxia: Continue HFNC and NRB, monitor respiratory effort and cont continuous pulse ox. IS. Consider proning if SpO2 remains low and patient able to tolerate.     Additional medical information reviewed: labs, recent progress notes    Physical Examination:        General appearance:  alert, cooperative and no distress  Mental Status:  oriented to person, place and time and normal affect  Lungs:  clear to auscultation bilaterally, diminished bilateral bases, normal effort  Heart: slightly tachy regular rate and rhythm, no murmur  Abdomen:  soft, nontender, nondistended, hypoactive bowel sounds, no masses, hepatomegaly, splenomegaly  Extremities:  no edema, redness, tenderness in the calves  Skin:  no gross lesions, rashes, induration    Problem List:        Hospital Problems           Last Modified POA    * (Principal) Pneumonia due to COVID-19 virus 1/9/2022 Yes    AMS (altered mental status) 1/9/2022 Yes    COPD without exacerbation ([address] 1/9/2022 Yes    A-fib ([address] 1/9/2022 Yes    Gastroesophageal reflux disease without esophagitis 1/9/2022 Yes    Neuropathy 1/9/2022 Yes    JO (acute kidney injury) ([address] 1/9/2022 Yes    Elevated troponin 1/9/2022 Yes    Encephalopathy due to COVID-19 virus 1/18/2022 Yes    Acute respiratory failure with hypercapnia ([address] 1/13/2022 Yes    Severe malnutrition ([address] 1/24/2022 Yes          Medications: Allergies:     Allergies   Allergen Reactions    Latex     Bee Venom     Prednisone Other (See Comments)     Mood swings       Current Meds:   Scheduled Meds:    furosemide  40 mg IntraVENous Daily    piperacillin-tazobactam  4,500 mg IntraVENous Q8H    metoprolol tartrate  25 mg Oral BID    oxyCODONE  60 mg Oral TID    enoxaparin  30 mg SubCUTAneous BID    famotidine  20 mg Oral BID    QUEtiapine  100 mg Oral BID    ipratropium-albuterol  1 ampule Inhalation 4x daily    budesonide  0.5 mg Nebulization BID    gabapentin  100 mg Oral TID    busPIRone  15 mg Oral Daily    DULoxetine  60 mg Oral Daily    finasteride  5 mg Oral Daily    cetirizine  10 mg Oral Daily    rivastigmine  1 patch TransDERmal Daily    [Held by provider] spironolactone  25 mg Oral BID    sodium chloride flush  5-40 mL IntraVENous 2 times per day    Vitamin D  2,000 Units Oral Daily     Continuous Infusions:    sodium chloride 25 mL (22 1439)     PRN Meds: ibuprofen, HYDROcodone-acetaminophen, aluminum & magnesium hydroxide-simethicone, potassium chloride **OR** potassium alternative oral replacement **OR** potassium chloride, albuterol, metoprolol, LORazepam **OR** LORazepam, sodium chloride flush, sodium chloride, ondansetron **OR** ondansetron, magnesium hydroxide, acetaminophen **OR** acetaminophen, guaiFENesin-dextromethorphan    Data:     Past Medical History:   has a past medical history of Anxiety, Arthritis, Atrial fib/flutter, transient, Bradycardia, COPD (chronic obstructive pulmonary disease) ([facility] Dementia ([facility] Depression, GERD (gastroesophageal reflux disease), Neuropathy, Parkinson's disease ([facility] Pneumonia, and Syncope. Vitals:  /74   Pulse 98   Temp 99.3 °F (37.4 °C) (Axillary)   Resp 15   Ht 5' 11\" (1.803 m)   Wt 247 lb 9.2 oz (112.3 kg)   SpO2 90%   BMI 34.53 kg/m²   Temp (24hrs), Av °F (37.2 °C), Min:98.2 °F (36.8 °C), Max:99.5 °F (37.5 °C)        I/O (24Hr): Intake/Output Summary (Last 24 hours) at 2022 0314  Last data filed at 2022 0207  Gross per 24 hour   Intake 1200 ml   Output 520 ml   Net 680 ml       Labs:    [unfilled]    Lab Results   Component Value Date/Time    SPECIAL RT HAND 1ML 2022 02:08 AM    SPECIAL LT AC 8ML 2022 02:08 AM     Lab Results   Component Value Date/Time    CULTURE NO GROWTH 2 DAYS 2022 02:08 AM    CULTURE NO GROWTH 2 DAYS 2022 02:08 AM           Radiology:    XR CHEST PORTABLE    Result Date: 2022  Interval worsening of bilateral opacities consistent with multifocal airspace disease right greater than left. XR CHEST PORTABLE    Result Date: 2022  Patchy bilateral consolidative opacities right greater than left mildly worsened from the previous exam compatible with multifocal pneumonia.      XR CHEST PORTABLE    Result Date: 2022  No significant change in bilateral airspace disease. XR CHEST PORTABLE    Result Date: 1/20/2022  Bibasilar atelectasis with probable right lower lobe infiltrate. Small effusions right-sided PICC tip position stable.        Carolin Arroyo, PATRICK - NP  1/26/2022  3:14 AM

## 2022-01-26 NOTE — PLAN OF CARE
Problem: Falls - Risk of:  Goal: Will remain free from falls  Description: Will remain free from falls  Outcome: Ongoing       Patient has remained free from falls this shift. Patient is alert and oriented times two . Bed to lowest position with door open. Patient care items and call light in reach. Patient does not use call light appropriately for assist. Will continue to monitor. Please see fall assessment.

## 2022-01-26 NOTE — PROGRESS NOTES
Patients SPO2 85% on HHF and non-rebreather. Writer attempted to place patient on BIPAP but patient refused. Patient encouraged to deep breathe. SPO2 = 88%. Will provide support and education as needed.

## 2022-01-26 NOTE — PROGRESS NOTES
Patients SPO2 around 85% for about 30 minutes with HHF and nonrebreather. Patient sitting upright, no signs of distress. Writer placed on BIPAP and he desatted to low 80s, patient placed back on HHF/nonrebreather. RT contacted and said the same happened last night and it took his SPO2 a while to come back up. NP on call notified, no new orders at this time but in house NP down to assess. SPO2 = 88%. Writer to update pulmonology with SPO2 < 88% per NP. Will provide support and education as needed.

## 2022-01-26 NOTE — PROGRESS NOTES
[facility]  Office: [address], DO, Love Favre, DO, Grayland Runner, DO, Radha Eye Blood, DO, Servando Grimm MD, Margarita Kiser MD, Chester Rivas MD, Maegan Quiles MD, Mindy Blanca MD, Osito Carpio MD, Duane Meehan MD, Manisha Roca, DO, Kimi Sterling, DO, Murlean Gowers, MD,  Kasey Garces DO, Roscoe Otero MD, Romina Avalos MD, Geovanni Barnes MD, Roberth Escalera MD, Dionisio Iverson MD, Nikki Weiner MD, Michelle Thompson MD, Clem Wilkinson [facility], [facility] Rollyava, CNP, King Burns, CNP, Lillie Hernández, CNS, Pawan Guidry, CNP, Marjan Candelario, CNP, Simba Otero, CNP, Farzana Villalpando, CNP, Danae Gaytan, CNP, Crystal Heredia PA-C, Kaya Garland, [facility], Desmond Wang [facility], Emerald Farooq, CNP, Dewey Forman, CNP, Saji Felder, CNP, Vipin Asencio, CNP, Yareli Leblanc, CNP, Erik Monteiro, [facility]    Progress Note    1/26/2022    12:53 PM    Name:   Mikki Schultz  MRN:     [id number]     [place]:      [de-identified]   Room:   [address] [place] [address] Day:  25  Admit Date:  1/8/2022  3:33 PM    PCP:   Steffany Spicer MD  Code Status:  DNR-CCA    Subjective:     C/C:   Chief Complaint   Patient presents with   Wilhelminia Blazing Altered Mental Status     Interval History Status: not changed. Remains confused but a little more coherent today  Still has sitter at bedside    Refusing to prone or lay on side  On hfnc along with nrb mask on top of that, and required bipap overnight due to desat    Brief History:     Per my BRYAN:  Corina Shrestha is a 59 y.o. Non- / non  male who presents with Altered Mental Status   and is admitted to the hospital for the management of COVID-19.     Since to the emergency room with altered mental status. Is confused and not able to provide any history for me. According to the ER nurse he was brought in by his wife after a fall at home. She states that he has been altered as well.   No additional information available. He was found to have mild respiratory acidosis and started on BiPAP. Covid is positive. He also had JO with BUN of 23 and creatinine of 3.09. Previous kidney function has been normal.\"    Review of Systems:     unobtainable      Medications: Allergies: Allergies   Allergen Reactions    Latex     Bee Venom     Prednisone Other (See Comments)     Mood swings       Current Meds:   Scheduled Meds:    furosemide  40 mg IntraVENous Daily    piperacillin-tazobactam  4,500 mg IntraVENous Q8H    metoprolol tartrate  25 mg Oral BID    oxyCODONE  60 mg Oral TID    enoxaparin  30 mg SubCUTAneous BID    famotidine  20 mg Oral BID    QUEtiapine  100 mg Oral BID    ipratropium-albuterol  1 ampule Inhalation 4x daily    budesonide  0.5 mg Nebulization BID    gabapentin  100 mg Oral TID    busPIRone  15 mg Oral Daily    DULoxetine  60 mg Oral Daily    finasteride  5 mg Oral Daily    cetirizine  10 mg Oral Daily    rivastigmine  1 patch TransDERmal Daily    [Held by provider] spironolactone  25 mg Oral BID    sodium chloride flush  5-40 mL IntraVENous 2 times per day    Vitamin D  2,000 Units Oral Daily     Continuous Infusions:    sodium chloride 25 mL (01/24/22 4049)     PRN Meds: ibuprofen, HYDROcodone-acetaminophen, aluminum & magnesium hydroxide-simethicone, potassium chloride **OR** potassium alternative oral replacement **OR** potassium chloride, albuterol, metoprolol, LORazepam **OR** LORazepam, sodium chloride flush, sodium chloride, ondansetron **OR** ondansetron, magnesium hydroxide, acetaminophen **OR** acetaminophen, guaiFENesin-dextromethorphan    Data:     Past Medical History:   has a past medical history of Anxiety, Arthritis, Atrial fib/flutter, transient, Bradycardia, COPD (chronic obstructive pulmonary disease) ([facility] [address] Dementia ([facility] [address] Depression, GERD (gastroesophageal reflux disease), Neuropathy, Parkinson's disease ([facility] [address] Pneumonia, and Syncope.     Social History: reports that he has quit smoking. His smoking use included cigarettes. He has never used smokeless tobacco. He reports that he does not drink alcohol and does not use drugs. Family History:   Family History   Family history unknown: Yes       Vitals:  /65   Pulse 107   Temp 99.3 °F (37.4 °C) (Oral)   Resp 21   Ht 5' 11\" (1.803 m)   Wt 249 lb 1.9 oz (113 kg)   SpO2 (!) 87%   BMI 34.75 kg/m²   Temp (24hrs), Av.1 °F (37.3 °C), Min:98.6 °F (37 °C), Max:99.5 °F (37.5 °C)    No results for input(s): POCGLU in the last 72 hours. I/O (24Hr): Intake/Output Summary (Last 24 hours) at 2022 1253  Last data filed at 2022 1247  Gross per 24 hour   Intake 300 ml   Output 1050 ml   Net -750 ml       Labs:  Hematology:  Recent Labs     22  0209 22  0506 22  0546   WBC 9.9 8.6 12.0*   RBC 4.60 4.24 4.75   HGB 12.3* 11.5* 12.7*   HCT 41.7 37.6* 41.7   MCV 90.7 88.7 87.8   MCH 26.7 27.1 26.7   MCHC 29.5 30.6 30.5   RDW 14.2 14.2 14.1    279 300   MPV 10.3 9.5 9.8     Chemistry:  Recent Labs     22  0506 22  0546 22  0606    143 141   K 4.0 3.7 3.3*    100 96*   CO2 29 28 30   GLUCOSE 117* 133* 140*   BUN 35* 36* 42*   CREATININE 1.24* 1.45* 1.25*   MG  --   --  2.4   ANIONGAP 12 15 15   LABGLOM 59* 49* 58*   GFRAA >60 60* >60   CALCIUM 8.9 8.8 9.0     No results for input(s): PROT, LABALBU, LABA1C, N1XLAUN, L0FUSEW, FT4, TSH, AST, ALT, LDH, GGT, ALKPHOS, LABGGT, BILITOT, BILIDIR, AMMONIA, AMYLASE, LIPASE, LACTATE, CHOL, HDL, LDLCHOLESTEROL, CHOLHDLRATIO, TRIG, VLDL, JKR88PJ, PHENYTOIN, PHENYF, URICACID, POCGLU in the last 72 hours.   ABG:  Lab Results   Component Value Date    POCPH 7.388 2022    POCPCO2 48.5 2022    POCPO2 50.0 2022    POCHCO3 29.2 2022    NBEA NOT REPORTED 2022    PBEA 3 2022    ZSW6YCA NOT REPORTED 2022    ODQH6OIU 84 2022    FIO2 100.0 2022     Lab Results   Component Value Date/Time    SPECIAL RT HAND 1ML 01/24/2022 02:08 AM    SPECIAL LT AC 8ML 01/24/2022 02:08 AM     Lab Results   Component Value Date/Time    CULTURE NO GROWTH 2 DAYS 01/24/2022 02:08 AM    CULTURE NO GROWTH 2 DAYS 01/24/2022 02:08 AM       Radiology:  XR ABDOMEN (KUB) (SINGLE AP VIEW)    Result Date: 1/16/2022  1. Unremarkable bowel gas pattern. XR ABDOMEN (KUB) (SINGLE AP VIEW)    Result Date: 1/11/2022  Nonobstructive bowel gas pattern. US LIVER    Result Date: 1/14/2022  Significantly limited study due to patient difficulty with cooperation during the imaging examination. Possible hepatic steatosis. Gallbladder, pancreas and common duct not visualized. RECOMMENDATIONS: Unavailable     XR CHEST PORTABLE    Result Date: 1/15/2022  Bronchial wall thickening with reticulonodular opacities in the right lung the left lung base, similar to prior. This may reflect infectious/inflammatory airways process it is not the typical appearance of atypical viral pneumonitis. XR CHEST PORTABLE    Result Date: 1/14/2022  No significant interval change, when compared to the previous study performed 01/12/2022. XR CHEST PORTABLE    Result Date: 1/12/2022  Mild interval increase in right lower lung field opacities, when compared to the previous study performed 1 day earlier. XR CHEST PORTABLE    Result Date: 1/11/2022  No significant interval change, when compared to the previous study performed 01/08/2022, as described above. US RETROPERITONEAL COMPLETE    Result Date: 1/10/2022  Nonvisualization of the right kidney. Limited exam as described.   Otherwise unremarkable exam. RECOMMENDATIONS: Unavailable       Physical Examination:        General appearance:  Awake, cooperative and no distress  Mental Status:  oriented to person and year and normal affect  Lungs:  clear to auscultation bilaterally, normal effort on hfnc  Heart:  regular rate and rhythm, no murmur  Abdomen:  soft, nontender, nondistended, normal bowel sounds, no masses, hepatomegaly, splenomegaly  Extremities:  no edema, redness, tenderness in the calves  Skin:  no gross lesions, rashes, induration    Assessment:        Hospital Problems           Last Modified POA    * (Principal) Pneumonia due to COVID-19 virus 1/9/2022 Yes    AMS (altered mental status) 1/9/2022 Yes    COPD without exacerbation ([address] 1/9/2022 Yes    A-fib ([address] 1/9/2022 Yes    Gastroesophageal reflux disease without esophagitis 1/9/2022 Yes    Neuropathy 1/9/2022 Yes    JO (acute kidney injury) ([address] 1/9/2022 Yes    Elevated troponin 1/9/2022 Yes    Encephalopathy due to COVID-19 virus 1/18/2022 Yes    Acute respiratory failure with hypercapnia ([address] 1/13/2022 Yes    Severe malnutrition ([address] 1/24/2022 Yes          Plan:        1. Wean hfnc/bipap as geeta; currently on hfnc 90%/60L-as he has been for over a week now  2. completed decadron per protocol  3. Monitor mental status  4. On zosyn for concerns of possible aspiration pneumonia  5. D/w wife Frandy Lay yesterday-she is considering withdrawal of support and comfort measures only  6.  Pall care eval to assist in above    Shraddha Poor Blood, DO  1/26/2022  12:53 PM

## 2022-01-26 NOTE — PROGRESS NOTES
Pulmonary Critical Care Progress Note  Keturah Smith CNP/Aleajndro Diana MD     Patient seen for the follow up of acute hypoxic respiratory insufficiency, Pneumonia due to COVID-19 virus     Subjective:  He is resting in bed, one-to-one sitter at bedside. He used BiPAP overnight. He remains on high flow, currently on 90% / 60 L with nonrebreather over it. He is more alert today. He says the shortness of breath is mild. He has occasional cough, mostly dry. No chest pain. He is tolerating orals     Examination:  Vitals: /78   Pulse 106   Temp 98.2 °F (36.8 °C) (Axillary)   Resp 25   Ht 5' 11\" (1.803 m)   Wt 249 lb 1.9 oz (113 kg)   SpO2 90%   BMI 34.75 kg/m²   General appearance:   Awake follows commands, more alert today   Neck: No JVD  Lungs:  Decreased breath sounds no crackles or wheezing  Heart: regular rate and rhythm, S1, S2 normal, no gallop  Abdomen: Soft, non tender, + BS  Extremities: no cyanosis or clubbing.  No significant edema    LABs:  BMP:   Recent Labs     01/25/22  0546 01/26/22  0606    141   K 3.7 3.3*   CO2 28 30   BUN 36* 42*   CREATININE 1.45* 1.25*   LABGLOM 49* 58*   GLUCOSE 133* 140*     ABG:  Lab Results   Component Value Date    VFG4YAC NOT REPORTED 01/22/2022    FIO2 100.0 01/22/2022       Lab Results   Component Value Date    POCPH 7.388 01/22/2022    POCPCO2 48.5 01/22/2022    POCPO2 50.0 01/22/2022    POCHCO3 29.2 01/22/2022    NBEA NOT REPORTED 01/22/2022    PBEA 3 01/22/2022    QBC0QPZ NOT REPORTED 01/22/2022    GTSO3DTA 84 01/22/2022    FIO2 100.0 01/22/2022       Radiology:  X-ray chest: 1/24/2022      Impression:  · Acute hypoxic respiratory  failure requiring BiPAP and high flow  · COVID-19  Pneumonia  · Altered mental status/encephalopathy  · Acute kidney injury  · Suspected obstructive sleep apnea/Obesity  · A. fib, anxiety, arthritis, GERD  · Elevated D-dimer  · Elevated liver function    Recommendations:  · Oxygen via high flow to keep saturation above 90%, currently on 60 L / 90% FiO2  · BiPAP while asleep and as needed  · Incentive spirometry every hour while wake  · DuoNeb by nebulizer 4 times daily  · Pulmicort 0.5 q.12 hours by nebulizer  · Zosyn  · Diuresis with IV Lasix, monitor renal function  · Seroquel 100 mg twice daily  · Monitor mental status  · Cardiology following  · Diet as tolerated, encourage oral intake  · Prone as tolerated  · Status post course of Decadron   · PT/OT  · Discussed with RN  · DVT prophylaxis on Lovenox 30 mg twice daily  · GI prophylaxis, Pepcid  · DNR CCA  · We will follow with you    Electronically signed by     PATRICK Salazar CNP on 1/26/2022 at 4:53 PM  Pulmonary Critical Care and Sleep Medicine,  [facility] AT [place]: [phone]

## 2022-01-27 NOTE — PROGRESS NOTES
Physical Therapy  DATE: 2022    NAME: Severa Skill  MRN: [id number]   : 1960    Patient not seen this date for Physical Therapy due to:      [] Cancel by RN or physician due to:    [] Hemodialysis    [] Critical Lab Value Level     [] Blood transfusion in progress    [] Acute or unstable cardiovascular status   _MAP < 55 or more than >115  _HR < 40 or > 130    [] Acute or unstable pulmonary status   -FiO2 > 60%   _RR < 5 or >40    _O2 sats < 85%    [] Strict Bedrest    [] Off Unit for surgery or procedure    [] Off Unit for testing       [] Pending imaging to R/O fracture    [] Refusal by Patient      [] Other      [] PT being discontinued at this time. Patient independent. No further needs. [x] PT being discontinued at this time as the patient has been transferred to hospice care. No further needs.       Rylan Fink, PTA

## 2022-01-27 NOTE — PROGRESS NOTES
Pulmonary Critical Care Progress Note  Juan Francisco Christy CNP/Alejandro Diana MD     Patient seen for the follow up of acute hypoxic respiratory insufficiency, Pneumonia due to COVID-19 virus     Subjective:  No significant overnight events noted. He wore BiPAP last night. He remains with one-to-one sitter at bedside. He is on high flow 90% / 60 L with nonrebreather over to maintain saturation he is resting in bed, one-to-one sitter at bedside. He used BiPAP overnight. He remains on high flow, currently on 90% / 60 L with nonrebreather over it to maintain saturation greater than 90%. He denies any chest pain. He has occasional cough with yellow sputum. Shortness of breath is not much change. He tells me he is just tired and worn out. Examination:  Vitals: /83   Pulse 122   Temp 98.2 °F (36.8 °C) (Oral)   Resp 16   Ht 5' 11\" (1.803 m)   Wt 250 lb 3.6 oz (113.5 kg)   SpO2 (!) 78%   BMI 34.90 kg/m²   General appearance:   Awake, alert and cooperative with exam  Neck: No JVD  Lungs:  Decreased breath sounds no crackles or wheezing  Heart: regular rate and rhythm, S1, S2 normal, no gallop  Abdomen: Soft, non tender, + BS  Extremities: no cyanosis or clubbing.  No significant edema    LABs:  BMP:   Recent Labs     01/26/22  0606 01/27/22  0615    139   K 3.3* 3.5*   CO2 30 34*   BUN 42* 40*   CREATININE 1.25* 1.23*   LABGLOM 58* 59*   GLUCOSE 140* 125*     ABG:  Lab Results   Component Value Date    MCZ8NJW NOT REPORTED 01/22/2022    FIO2 100.0 01/22/2022       Lab Results   Component Value Date    POCPH 7.388 01/22/2022    POCPCO2 48.5 01/22/2022    POCPO2 50.0 01/22/2022    POCHCO3 29.2 01/22/2022    NBEA NOT REPORTED 01/22/2022    PBEA 3 01/22/2022    HUF8TWE NOT REPORTED 01/22/2022    QDVB3MOE 84 01/22/2022    FIO2 100.0 01/22/2022       Radiology:  X-ray chest: 1/26/2022      Impression:  · Acute hypoxic respiratory  failure requiring BiPAP and high flow  · COVID-19  Pneumonia  · Altered mental status/encephalopathy  · Acute kidney injury  · Suspected obstructive sleep apnea/Obesity  · A. fib, anxiety, arthritis, GERD  · Elevated D-dimer  · Elevated liver function    Recommendations:  · Oxygen via high flow to keep saturation above 90%, currently on 60 L / 90% FiO2  · BiPAP while asleep and as needed  · Incentive spirometry every hour while wake  · DuoNeb by nebulizer 4 times daily  · Pulmicort 0.5 q.12 hours by nebulizer  · Zosyn  · Diuresis with IV Lasix, monitor renal function  · Seroquel 100 mg twice daily  · Monitor mental status  · Cardiology following  · Diet as tolerated, encourage oral intake  · Prone as tolerated  · Status post course of Decadron   · PT/OT  · Discussed with RN  · DVT prophylaxis on Lovenox 30 mg twice daily  · GI prophylaxis, Pepcid  · DNR CCA  · Plans noted for palliative care meeting today  · We will follow with you    Electronically signed by     PATRICK Lebron CNP on 1/27/2022 at 9:35 AM  Pulmonary Critical Care and Sleep Medicine,  [facility] AT [place]: [phone]

## 2022-01-27 NOTE — PROGRESS NOTES
SaO2 drops to 82% on heated high flow at 90% & 60L, with non-rebreather at 15 L. Encouraged patient to wear BIPAP, which patient agreed to with PRN Ativan 2 mg IVP. Patient requests to drink water, which drops SaO2 into the 70s. BIPAP applied with settings at 84/16 at 100% O2. SaO2 rises to 85%. Patient refuses to lie prone, but currently no respiratory distress noted. Will monitor.

## 2022-01-27 NOTE — PROGRESS NOTES
Patient's spouse in to sit with patient. Patient currently on heated high flow cannula. Will continue to monitor patient status with frequent rounds.

## 2022-01-27 NOTE — ACP (ADVANCE CARE PLANNING)
Advance Care Planning     Advance Care Planning (ACP) Physician/NP/PA Conversation    Date of Conversation: 1/8/2022  Conducted with:  Healthcare Decision Maker: Next of Kin by law (only applies in absence of above) (name) Wife    Healthcare Decision Maker:      Primary Decision Maker: Maura Jaimes - Spouse - [phone]    Click here to complete [address] including selection of the Healthcare Decision Maker Relationship (ie \"Primary\")  Today we documented Decision Maker(s) consistent with Legal Next of Kin hierarchy. Care Preferences:    Hospitalization: \"If your health worsens and it becomes clear that your chance of recovery is unlikely, what would be your preference regarding hospitalization? \"  The patient would prefer hospitalization. Ventilation: \"If you were unable to breath on your own and your chance of recovery was unlikely, what would be your preference about the use of a ventilator (breathing machine) if it was available to you? \"  The patient would NOT desire the use of a ventilator. Resuscitation: \"In the event your heart stopped as a result of an underlying serious health condition, would you want attempts made to restart your heart, or would you prefer a natural death? \"  No, do NOT attempt to resuscitate.     benefit/burden of treatment options, ventilation preferences, hospitalization preferences, resuscitation preferences, end of life care preferences (vegetative state/imminent death) and hospice care    Conversation Outcomes / Follow-Up Plan:  ACP complete - no further action today  Reviewed DNR/DNI and patient elects DNR order - completed portable DNR form & placed order    Length of Voluntary ACP Conversation in minutes:  16 minutes    Elder Rivas APRN - CNP

## 2022-01-27 NOTE — PROGRESS NOTES
Occupational Therapy    DATE: 2022    NAME: Myles Hauser  MRN: [id number]   : 1960    Patient not seen this date for Occupational Therapy due to:      [] Cancel by RN or physician due to:    [] Hemodialysis    [] Critical Lab Value Level     [] Blood transfusion in progress    [] Acute or unstable cardiovascular status   _MAP < 55 or more than >115  _HR < 40 or > 130    [] Acute or unstable pulmonary status   -FiO2 > 60%   _RR < 5 or >40    _O2 sats < 85%    [] Strict Bedrest    [] Off Unit for surgery or procedure    [] Off Unit for testing       [] Pending imaging to R/O fracture    [] Refusal by Patient      [] Other         [x] OT being discontinued at this time as the patient has been transferred to hospice care. No further needs.       Aidee Robles OTR/L

## 2022-01-27 NOTE — FLOWSHEET NOTE
Writer to 1001 per consult from [facility] of Palliative. Patient and spouse wish prayer. Writer speaks to [employer] and states that he cannot enter the patient's room due to droplet isolation. Cary Haro speaks to the patient's spouse and informs her that writer cannot enter the room. Spouse acknowledges and states \"He is out of it anyway. \" Zhou Corrales offers a prayer outside of the room. Patient has been anointed by Fr. Brandan Mayorga. Spiritual Care will follow as needed.        01/27/22 1817   Encounter Summary   Services provided to: Patient not available   Referral/Consult From: Palliative Care   Support System Spouse   Complexity of Encounter Moderate   Length of Encounter 15 minutes   Routine   Type Follow up   Grief and Life Adjustment   Type End of life;Palliative care   Outcome Concerns relieved

## 2022-01-27 NOTE — PROGRESS NOTES
SaO2 drops to 84% on heated high flow at 90% & 60L, with non-rebreather at 15L. Upon entering room, patient's non-rebreather not over mouth. Patient is alert & oriented. Educated patient to keep non-rebreather over mouth & nose, and repositioned non-rebreather. SaO2 rises to 88%. Patient able to take PO medications. Encouraged patient to wear BIPAP, which patient refused stating, \"I don't want to wear that, because it makes me claustrophobic. \"  Offered PRN Ativan, which patient refused. Will continue to monitor patient.

## 2022-01-27 NOTE — PROGRESS NOTES
Section of Cardiology  Progress Note      Date:  1/27/2022  Patient: Shira Vital  Admission:  1/8/2022  3:33 PM  Admit DX: Acute respiratory failure with hypercapnia (HCC) [J96.02]  Altered mental status, unspecified altered mental status type [R41.82]  AMS (altered mental status) [R41.82]  COVID-19 [U07.1]  Age:  59 y.o., 1960     LOS: 19 days     Reason for evaluation:   arrhythmia      SUBJECTIVE:     Notes and labs reviewed. Patient is on high flow oxygen. I was in the room with him and he had a conversation. He was happy to see me and we talked about the old times. His oxygen saturation dropped but he was clinically stable. OBJECTIVE:    Telemetry: Sinus  /83   Pulse 122   Temp 98.2 °F (36.8 °C) (Oral)   Resp 16   Ht 5' 11\" (1.803 m)   Wt 250 lb 3.6 oz (113.5 kg)   SpO2 (!) 78%   BMI 34.90 kg/m²     Intake/Output Summary (Last 24 hours) at 1/27/2022 3942  Last data filed at 1/26/2022 1900  Gross per 24 hour   Intake 800 ml   Output 950 ml   Net -150 ml       EXAM:   Physical exam was deferred due to Covid 19 isolation status and to minimize the spent of infection.     Current Inpatient Medications:   furosemide  40 mg IntraVENous Daily    piperacillin-tazobactam  4,500 mg IntraVENous Q8H    metoprolol tartrate  25 mg Oral BID    oxyCODONE  60 mg Oral TID    enoxaparin  30 mg SubCUTAneous BID    famotidine  20 mg Oral BID    QUEtiapine  100 mg Oral BID    ipratropium-albuterol  1 ampule Inhalation 4x daily    budesonide  0.5 mg Nebulization BID    gabapentin  100 mg Oral TID    busPIRone  15 mg Oral Daily    DULoxetine  60 mg Oral Daily    finasteride  5 mg Oral Daily    cetirizine  10 mg Oral Daily    rivastigmine  1 patch TransDERmal Daily    [Held by provider] spironolactone  25 mg Oral BID    sodium chloride flush  5-40 mL IntraVENous 2 times per day    Vitamin D  2,000 Units Oral Daily       IV Infusions (if any):   sodium chloride 25 mL (01/24/22 5149) Diagnostics:   EKG: Sinus tachycardia. Labs:   CBC:   Recent Labs     01/25/22  0546   WBC 12.0*   HGB 12.7*   HCT 41.7        BMP:   Recent Labs     01/26/22  0606 01/27/22  0615    139   K 3.3* 3.5*   CO2 30 34*   BUN 42* 40*   CREATININE 1.25* 1.23*   LABGLOM 58* 59*   GLUCOSE 140* 125*     No results found for: BNP  PT/INR: No results for input(s): PROTIME, INR in the last 72 hours. APTT:No results for input(s): APTT in the last 72 hours. CARDIAC ENZYMES:  No results for input(s): CKTOTAL, CKMB, CKMBINDEX, TROPONINT in the last 72 hours. FASTING LIPID PANEL:  Lab Results   Component Value Date    HDL 27 09/26/2020    TRIG 186 09/26/2020     LIVER PROFILE:No results for input(s): AST, ALT, LABALBU in the last 72 hours. ASSESSMENT:  1.  Short nonsustained ventricular tachycardia  2.  History of atrial fibrillation, status post ablation in 2014  3.  Permanent pacemaker  4.  Acute respiratory failure  5.  COVID-19 pneumonia  6.  Acute kidney injury, resolved now returned  8.  Altered mental status  9.  Elevated D-dimer  10.  Elevated cardiac enzymes  PLAN:  1. Maintain Mg2+ >2 and K+ >4.    2. Continue beta blocker therapy . Tachycardia is due to covid pneumonia and will not respond to bblockers. Continue supportive therapy. Patient is being seen by palliative care. Comfort care will be pursued. Please see orders.  Discussed with nurse    Diana Zamorano MD, MD

## 2022-01-27 NOTE — PROGRESS NOTES
started on BiPAP. Covid is positive. He also had JO with BUN of 23 and creatinine of 3.09. Previous kidney function has been normal.\"    Review of Systems:     unobtainable      Medications: Allergies: Allergies   Allergen Reactions    Latex     Bee Venom     Prednisone Other (See Comments)     Mood swings       Current Meds:   Scheduled Meds:    furosemide  40 mg IntraVENous Daily    piperacillin-tazobactam  4,500 mg IntraVENous Q8H    metoprolol tartrate  25 mg Oral BID    oxyCODONE  60 mg Oral TID    enoxaparin  30 mg SubCUTAneous BID    famotidine  20 mg Oral BID    QUEtiapine  100 mg Oral BID    ipratropium-albuterol  1 ampule Inhalation 4x daily    budesonide  0.5 mg Nebulization BID    gabapentin  100 mg Oral TID    busPIRone  15 mg Oral Daily    DULoxetine  60 mg Oral Daily    finasteride  5 mg Oral Daily    cetirizine  10 mg Oral Daily    rivastigmine  1 patch TransDERmal Daily    [Held by provider] spironolactone  25 mg Oral BID    sodium chloride flush  5-40 mL IntraVENous 2 times per day    Vitamin D  2,000 Units Oral Daily     Continuous Infusions:    sodium chloride 25 mL (01/24/22 0029)     PRN Meds: glycopyrrolate, fentaNYL **OR** fentaNYL, ibuprofen, HYDROcodone-acetaminophen, aluminum & magnesium hydroxide-simethicone, potassium chloride **OR** potassium alternative oral replacement **OR** potassium chloride, albuterol, metoprolol, LORazepam **OR** LORazepam, sodium chloride flush, sodium chloride, ondansetron **OR** ondansetron, magnesium hydroxide, acetaminophen **OR** acetaminophen, guaiFENesin-dextromethorphan    Data:     Past Medical History:   has a past medical history of Anxiety, Arthritis, Atrial fib/flutter, transient, Bradycardia, COPD (chronic obstructive pulmonary disease) ([facility] [address] Dementia ([facility] [address] Depression, GERD (gastroesophageal reflux disease), Neuropathy, Parkinson's disease ([facility] [address] Pneumonia, and Syncope.     Social History:   reports that he has quit smoking. His smoking use included cigarettes. He has never used smokeless tobacco. He reports that he does not drink alcohol and does not use drugs. Family History:   Family History   Family history unknown: Yes       Vitals:  /71   Pulse 117   Temp 99.7 °F (37.6 °C) (Oral)   Resp 16   Ht 5' 11\" (1.803 m)   Wt 250 lb 3.6 oz (113.5 kg)   SpO2 (!) 88%   BMI 34.90 kg/m²   Temp (24hrs), Av.3 °F (36.8 °C), Min:97.7 °F (36.5 °C), Max:99.7 °F (37.6 °C)    No results for input(s): POCGLU in the last 72 hours. I/O (24Hr): Intake/Output Summary (Last 24 hours) at 2022 1333  Last data filed at 2022 1154  Gross per 24 hour   Intake 800 ml   Output 500 ml   Net 300 ml       Labs:  Hematology:  Recent Labs     22  0546   WBC 12.0*   RBC 4.75   HGB 12.7*   HCT 41.7   MCV 87.8   MCH 26.7   MCHC 30.5   RDW 14.1      MPV 9.8     Chemistry:  Recent Labs     22  0546 22  0606 22  0615    141 139   K 3.7 3.3* 3.5*    96* 93*   CO2 28 30 34*   GLUCOSE 133* 140* 125*   BUN 36* 42* 40*   CREATININE 1.45* 1.25* 1.23*   MG  --  2.4 2.2   ANIONGAP 15 15 12   LABGLOM 49* 58* 59*   GFRAA 60* >60 >60   CALCIUM 8.8 9.0 9.4     No results for input(s): PROT, LABALBU, LABA1C, R8EEUUD, V7LAAED, FT4, TSH, AST, ALT, LDH, GGT, ALKPHOS, LABGGT, BILITOT, BILIDIR, AMMONIA, AMYLASE, LIPASE, LACTATE, CHOL, HDL, LDLCHOLESTEROL, CHOLHDLRATIO, TRIG, VLDL, GQY60MD, PHENYTOIN, PHENYF, URICACID, POCGLU in the last 72 hours.   ABG:  Lab Results   Component Value Date    POCPH 7.388 2022    POCPCO2 48.5 2022    POCPO2 50.0 2022    POCHCO3 29.2 2022    NBEA NOT REPORTED 2022    PBEA 3 2022    IKW6WAH NOT REPORTED 2022    IARX5FAA 84 2022    FIO2 100.0 2022     Lab Results   Component Value Date/Time    SPECIAL RT HAND 1ML 2022 02:08 AM    SPECIAL LT AC 8ML 2022 02:08 AM     Lab Results   Component Value Date/Time CULTURE NO GROWTH 3 DAYS 01/24/2022 02:08 AM    CULTURE NO GROWTH 3 DAYS 01/24/2022 02:08 AM       Radiology:  XR ABDOMEN (KUB) (SINGLE AP VIEW)    Result Date: 1/16/2022  1. Unremarkable bowel gas pattern. XR ABDOMEN (KUB) (SINGLE AP VIEW)    Result Date: 1/11/2022  Nonobstructive bowel gas pattern. US LIVER    Result Date: 1/14/2022  Significantly limited study due to patient difficulty with cooperation during the imaging examination. Possible hepatic steatosis. Gallbladder, pancreas and common duct not visualized. RECOMMENDATIONS: Unavailable     XR CHEST PORTABLE    Result Date: 1/15/2022  Bronchial wall thickening with reticulonodular opacities in the right lung the left lung base, similar to prior. This may reflect infectious/inflammatory airways process it is not the typical appearance of atypical viral pneumonitis. XR CHEST PORTABLE    Result Date: 1/14/2022  No significant interval change, when compared to the previous study performed 01/12/2022. XR CHEST PORTABLE    Result Date: 1/12/2022  Mild interval increase in right lower lung field opacities, when compared to the previous study performed 1 day earlier. XR CHEST PORTABLE    Result Date: 1/11/2022  No significant interval change, when compared to the previous study performed 01/08/2022, as described above. US RETROPERITONEAL COMPLETE    Result Date: 1/10/2022  Nonvisualization of the right kidney. Limited exam as described.   Otherwise unremarkable exam. RECOMMENDATIONS: Unavailable       Physical Examination:        General appearance:  Awakens and no distress  Mental Status:  Mumbles incoherently  Lungs:  clear to auscultation bilaterally, normal effort on hfnc  Heart:  regular rate and rhythm, no murmur  Abdomen:  soft, nontender, nondistended, normal bowel sounds, no masses, hepatomegaly, splenomegaly  Extremities:  no edema, redness, tenderness in the calves  Skin:  no gross lesions, rashes, induration    Assessment: Hospital Problems           Last Modified POA    * (Principal) Pneumonia due to COVID-19 virus 1/9/2022 Yes    AMS (altered mental status) 1/9/2022 Yes    COPD without exacerbation ([address] 1/9/2022 Yes    A-fib ([address] 1/9/2022 Yes    Gastroesophageal reflux disease without esophagitis 1/9/2022 Yes    Neuropathy 1/9/2022 Yes    JO (acute kidney injury) ([address] 1/9/2022 Yes    Elevated troponin 1/9/2022 Yes    Encephalopathy due to COVID-19 virus 1/18/2022 Yes    Acute respiratory failure with hypercapnia ([address] 1/13/2022 Yes    Severe malnutrition ([address] 1/24/2022 Yes          Plan:        1. Wean hfnc/bipap as geeta; currently on hfnc 90%/60L-as he has been for over a week now  2. completed decadron per protocol  3. Monitor mental status  4. On zosyn for concerns of possible aspiration pneumonia  5. Wife coming in this pm and wants to remove the hfnc and just keep him comfortable  6.  Appreciate pall care assistance    Jose Cancino Blood, DO  1/27/2022  1:33 PM

## 2022-01-27 NOTE — PLAN OF CARE
I was updated by [facility] RN that wife has arrived, and reports Fentanyl is not effective and has not been effective in the past. She is requesting it be switched to MS. Switched per request. Patient was able to come of NRB mask and just remain on HFNC with comfort meds now.

## 2022-01-27 NOTE — PROGRESS NOTES
Writer called into room by safety sitter due to patient complaining of difficulty breathing. Upon entering room patient has high flow cannula and nonrebreater mask on satting at mid 80%. o2 devices adjusted and sats anjali to 90%. Patient educated on need for bipap to maintain sats. Patient hesistant to wear bipap but agreeable. Bipap placed and patient satting 93%. 0840: Writer informed by clin lead that patient was attempting to remove bipap to eat breakfast, patient switched over to HHF/NRB to eat. 4291: Writer in to reposition patient. HHF/NRB remains on at this time, o2 sats are WNL. Will continue to monitor oxygen saturations with hourly rounds.

## 2022-01-27 NOTE — PLAN OF CARE
Problem: Non-Violent Restraints  Goal: Removal from restraints as soon as assessed to be safe  Outcome: Met This Shift  Note: Assessed for need for non-violent restraints to prevent self-harm from pulling on lines, Forman catheters, implanted ports, etc.  Assessed pt's ability to accept reorientation & verbal redirection from staff. Goal: No harm/injury to patient while restraints in use  Outcome: Met This Shift  Goal: Patient's dignity will be maintained  Outcome: Met This Shift     Problem: VIOLENT RESTRAINTS  Goal: Removal from restraints as soon as assessed to be safe  Outcome: Met This Shift  Note: Assessed for need for non-violent restraints to prevent self-harm from pulling on lines, Forman catheters, implanted ports, etc.  Assessed pt's ability to accept reorientation & verbal redirection from staff. Goal: No harm/injury to patient while restraints in use  Outcome: Met This Shift  Goal: Patient's dignity will be maintained  Outcome: Met This Shift     Problem: Pain:  Goal: Pain level will decrease  Description: Pain level will decrease  Outcome: Ongoing  Note: Monitoring pain with each assessment and prn. JA 0-10 pain scale utilized. Non-pharmacological measures to be encouraged prior to pharmacological measures. Goal: Control of acute pain  Description: Control of acute pain  Outcome: Ongoing  Goal: Control of chronic pain  Description: Control of chronic pain  Outcome: Ongoing     Problem: Skin Integrity:  Goal: Will show no infection signs and symptoms  Description: Will show no infection signs and symptoms  Outcome: Ongoing  Note: Monitor for signs & symptoms of infection. Utilize standard precautions & proper handwashing. Communicate referral to infection control. Goal: Absence of new skin breakdown  Description: Absence of new skin breakdown  Outcome: Ongoing  Note: Continuing to monitor for skin integrity risks. Patient independent with turning/repositioning.  Turning/repositioning encouraged at least once every 2 hrs, and prn basis. Hygiene care being completed independently per patient; assistance provided when deemed necessary. Problem: Falls - Risk of:  Goal: Will remain free from falls  Description: Will remain free from falls  1/27/2022 0133 by Sanford Patel RN  Outcome: Ongoing  Note: Pt fall risk, fall band present, falling star, safety alarm activated and in use as needed. Hourly rounding performed. Pt encouraged to use call light. See Davina Blanco fall risk assessment. 1/26/2022 1550 by Luis Aviles RN  Outcome: Ongoing  Goal: Absence of physical injury  Description: Absence of physical injury  Outcome: Ongoing  Note: Non-skid socks in place, up with assistance, bed in lowest position, bed exit & alarm as needed, provide toileting every 2 hours an d as needed. Problem: Airway Clearance - Ineffective  Goal: Achieve or maintain patent airway  Outcome: Ongoing     Problem: Gas Exchange - Impaired  Goal: Absence of hypoxia  Outcome: Ongoing  Goal: Promote optimal lung function  Outcome: Ongoing     Problem: Breathing Pattern - Ineffective  Goal: Ability to achieve and maintain a regular respiratory rate  Outcome: Ongoing     Problem:  Body Temperature -  Risk of, Imbalanced  Goal: Ability to maintain a body temperature within defined limits  Outcome: Ongoing  Goal: Will regain or maintain usual level of consciousness  Outcome: Ongoing  Goal: Complications related to the disease process, condition or treatment will be avoided or minimized  Outcome: Ongoing     Problem: Isolation Precautions - Risk of Spread of Infection  Goal: Prevent transmission of infection  Outcome: Ongoing     Problem: Nutrition Deficits  Goal: Optimize nutritional status  Outcome: Ongoing     Problem: Risk for Fluid Volume Deficit  Goal: Maintain normal heart rhythm  Outcome: Ongoing  Goal: Maintain absence of muscle cramping  Outcome: Ongoing  Goal: Maintain normal serum potassium, sodium, calcium, phosphorus, and pH  Outcome: Ongoing     Problem: Loneliness or Risk for Loneliness  Goal: Demonstrate positive use of time alone when socialization is not possible  Outcome: Ongoing     Problem: Fatigue  Goal: Verbalize increase energy and improved vitality  Outcome: Ongoing     Problem: Patient Education: Go to Patient Education Activity  Goal: Patient/Family Education  Outcome: Ongoing     Problem: Confusion - Acute:  Goal: Absence of continued neurological deterioration signs and symptoms  Description: Absence of continued neurological deterioration signs and symptoms  Outcome: Ongoing  Goal: Mental status will be restored to baseline  Description: Mental status will be restored to baseline  Outcome: Ongoing     Problem: Discharge Planning:  Goal: Ability to perform activities of daily living will improve  Description: Ability to perform activities of daily living will improve  Outcome: Ongoing  Goal: Participates in care planning  Description: Participates in care planning  Outcome: Ongoing     Problem: Injury - Risk of, Physical Injury:  Goal: Will remain free from falls  Description: Will remain free from falls  1/27/2022 0133 by Job Martino RN  Outcome: Ongoing  Note: Pt fall risk, fall band present, falling star, safety alarm activated and in use as needed. Hourly rounding performed. Pt encouraged to use call light. See Kaila Florentino fall risk assessment. 1/26/2022 1550 by Obdulio Barajas RN  Outcome: Ongoing  Goal: Absence of physical injury  Description: Absence of physical injury  Outcome: Ongoing  Note: Non-skid socks in place, up with assistance, bed in lowest position, bed exit & alarm as needed, provide toileting every 2 hours an d as needed.       Problem: Mood - Altered:  Goal: Mood stable  Description: Mood stable  Outcome: Ongoing  Goal: Absence of abusive behavior  Description: Absence of abusive behavior  Outcome: Ongoing  Goal: Verbalizations of feeling emotionally comfortable while being cared for will increase  Description: Verbalizations of feeling emotionally comfortable while being cared for will increase  Outcome: Ongoing     Problem: Psychomotor Activity - Altered:  Goal: Absence of psychomotor disturbance signs and symptoms  Description: Absence of psychomotor disturbance signs and symptoms  Outcome: Ongoing     Problem: Sensory Perception - Impaired:  Goal: Demonstrations of improved sensory functioning will increase  Description: Demonstrations of improved sensory functioning will increase  Outcome: Ongoing  Goal: Decrease in sensory misperception frequency  Description: Decrease in sensory misperception frequency  Outcome: Ongoing  Goal: Able to refrain from responding to false sensory perceptions  Description: Able to refrain from responding to false sensory perceptions  Outcome: Ongoing  Goal: Demonstrates accurate environmental perceptions  Description: Demonstrates accurate environmental perceptions  Outcome: Ongoing  Goal: Able to distinguish between reality-based and nonreality-based thinking  Description: Able to distinguish between reality-based and nonreality-based thinking  Outcome: Ongoing  Goal: Able to interrupt nonreality-based thinking  Description: Able to interrupt nonreality-based thinking  Outcome: Ongoing     Problem: Sleep Pattern Disturbance:  Goal: Appears well-rested  Description: Appears well-rested  Outcome: Ongoing     Problem: SKIN INTEGRITY  Goal: LTG - patient will maintain/improve skin integrity through proper skin care techniques  Outcome: Ongoing     Problem: Nutrition  Goal: Optimal nutrition therapy  Outcome: Ongoing

## 2022-01-27 NOTE — PROGRESS NOTES
01/26/22 2205   Oxygen Therapy/Pulse Ox   O2 Therapy Oxygen humidified   O2 Device Heated high flow cannula   O2 Flow Rate (L/min) 60 L/min   FiO2  90 %   Resp 20   SpO2 (!) 89 %   Skin Assessment Clean, dry, & intact   Humidification Source Heated wire   Humidification Temp 34   Humidification Temp Measured 34   Pulse Oximeter Device Mode Continuous   Pulse Oximeter Device Location Finger   Pulse Oximeter Low SpO2 Alarm 90   Pulse Oximeter High SpO2 Alarm 100       Pt refusing BiPAP. Pt remains on HFNC. BiPAP on standby at bedside should pt need.

## 2022-01-27 NOTE — CONSULTS
.    Palliative Care Inpatient Consult    NAME:  Carrie Loera  MEDICAL RECORD NUMBER:  [id number]  AGE: 59 y.o. GENDER: male  : 1960  TODAY'S DATE:  2022    Reasons for Consultation:    Symptom and/or pain management  Provision of information regarding PC and/or hospice philosophies  Complex, time-intensive communication and interdisciplinary psychosocial support  Clarification of goals of care and/or assistance with difficult decision-making  Guidance in regards to resources and transition(s)    Members of PC team contributing to this consultation are :  Rashid Encinas, Palliative Care APRN-CNP  History of Present Illness     The patient is a 59 y.o. Non- / non  male who presents with Altered Mental Status      Referred to Palliative Care by   [x] Physician   [] Nursing  [] Family Request   [] Other:       He was admitted to the primary service for Acute respiratory failure with hypercapnia (HCC) [J96.02]  Altered mental status, unspecified altered mental status type [R41.82]  AMS (altered mental status) [R41.82]  COVID-19 [U07.1]. His hospital course has been associated with Pneumonia due to COVID-19 virus, AMS, acute respiratory failure with hypoxia and hypercapnia, JO, elevated troponin, elevated D-dimer, elevated inflammatory markers, hypotension, mild hyponatremia, and short nonsustained ventricular tachycardia. The patient has a complicated medical history and has been hospitalized since 2022  3:33 PM.  I reviewed patient's EMR, spoke to RN, and patient to collect history. Patient has a history of dementia, bradycardia, A. fib/flutter s/p PE ablation, pacemaker, COPD, GERD, depression, neuropathy, anxiety, back surgery, smoker, and Parkinson's disease. Patient was brought into the ED with complaints of AMS, and a fall at home. Patient had denied any chest pain, fevers, and/or shortness of breath. Patient reported that he typically would not be aware of the date.   Patient's admitting pertinent labs included; creatinine 3.09, sodium 130, hemoglobin 12.6, ALT 46, AST 60, troponin 69-77-38, COVID-19 detected, pH 7.249, PCO2 61.9, PO2 29.1, O2 sats 44%, CRP to 16.4, D-dimer 2.31, myoglobin 1115, ferritin 84, , pro-Gilbert 0.40, and  positive. Chest x-ray bilateral lower lung field reticulonodular prominence. CT head was negative. EKG revealed SR with occasional atrial paced complexes and premature supraventricular complexes, low voltage QRS, nonspecific T wave abnormality now evident in anterior leads, and QT has lengthened. Echo revealed 55% EF. Patient was started on BiPAP, and was confused in the ED pulling equipment off. IV fluids, and Decadron initiated. Nephrology and pulmonary consulted. Patient was unable to have CTA of chest to rule out due to agitation. Nephrology held diuretics. Cardiology was consulted on the 17th due to ventricular arrhythmias. Patient had a short run of wide-complex tachycardia, but patient remained asymptomatic. Patient is a DNR CCA CODE STATUS. Palliative care consulted for review of goals, CODE STATUS, and support. Patient is currently on HFNC and nonrebreather. He is confused, and also has a history of dementia. He is not eating or drinking, and appears tired. He becomes tachypneic with any conversation. Chest x-ray yesterday revealed  Impression   1. Persistent bilateral lung infiltrates, unchanged, compatible with   multifocal pneumonia.  There are probable small bilateral pleural effusions.      1/27 pertinent labs include; BUN 40, creatinine 1.23, potassium 3.5,    Active Hospital Problems    Diagnosis Date Noted    Severe malnutrition ([facility] [E43] 01/24/2022    Acute respiratory failure with hypercapnia (HCC) [J96.02]     COPD without exacerbation ([facility] [J44.9] 01/09/2022    A-fib ([facility] [I48.91] 01/09/2022    Gastroesophageal reflux disease without esophagitis [K21.9] 01/09/2022    Neuropathy [G62.9] 01/09/2022    JO (acute kidney injury) ([facility] [N17.9] 01/09/2022    Elevated troponin [R77.8] 01/09/2022    Pneumonia due to COVID-19 virus [U07.1, J12.82] 01/09/2022    Encephalopathy due to COVID-19 virus [U07.1, G93.49] 01/09/2022    AMS (altered mental status) [R41.82] 01/08/2022       PAST MEDICAL HISTORY      Diagnosis Date    Anxiety     Arthritis     Atrial fib/flutter, transient     Bradycardia     [facility]     Dementia ([facility] 2017    Depression     GERD (gastroesophageal reflux disease)     Neuropathy     Parkinson's disease ([facility]     Pneumonia     Syncope        PAST SURGICAL HISTORY  Past Surgical History:   Procedure Laterality Date    ABLATION OF DYSRHYTHMIC FOCUS  11-    BACK SURGERY      BREAST SURGERY      CARDIAC CATHETERIZATION  2010    CARDIAC PACEMAKER PLACEMENT      CHOLECYSTECTOMY      HAND SURGERY Right     thumb    TONSILLECTOMY      TRANSESOPHAGEAL ECHOCARDIOGRAM  11-       SOCIAL HISTORY  Social History     Tobacco Use    Smoking status: Former Smoker     Types: Cigarettes    Smokeless tobacco: Never Used    Tobacco comment: quit 10 years ago   Substance Use Topics    Alcohol use: No    Drug use: Never       ALLERGIES  Allergies   Allergen Reactions    Latex     Bee Venom     Prednisone Other (See Comments)     Mood swings         MEDICATIONS  Current Medications    furosemide  40 mg IntraVENous Daily    piperacillin-tazobactam  4,500 mg IntraVENous Q8H    metoprolol tartrate  25 mg Oral BID    oxyCODONE  60 mg Oral TID    enoxaparin  30 mg SubCUTAneous BID    famotidine  20 mg Oral BID    QUEtiapine  100 mg Oral BID    ipratropium-albuterol  1 ampule Inhalation 4x daily    budesonide  0.5 mg Nebulization BID    gabapentin  100 mg Oral TID    busPIRone  15 mg Oral Daily    DULoxetine  60 mg Oral Daily    finasteride  5 mg Oral Daily    cetirizine  10 mg Oral Daily    rivastigmine  1 patch  DULoxetine (CYMBALTA) 60 MG capsule Take 60 mg by mouth daily.  QUETIAPINE FUMARATE PO Take 75 mg by mouth nightly Takes 25mg + 50mg tabs for 75mg nightly dose      diazepam (VALIUM) 10 MG tablet Take 10 mg by mouth 4 times daily as needed for Anxiety.  OxyCODONE HCl ER (OXYCONTIN) 60 MG T12A Take 60 mg by mouth 3 times daily.  midodrine (PROAMATINE) 2.5 MG tablet Take 2.5 mg by mouth 3 times daily as needed. Takes one or 2 tabs 3x day         Data         /83   Pulse 122   Temp 98.2 °F (36.8 °C) (Oral)   Resp 16   Ht 5' 11\" (1.803 m)   Wt 250 lb 3.6 oz (113.5 kg)   SpO2 (!) 78%   BMI 34.90 kg/m²     Wt Readings from Last 3 Encounters:   01/27/22 250 lb 3.6 oz (113.5 kg)        Code Status: DNR-CCA patient and wife confirm that he has no intubation. Wife reports wanting to make patient comfortable, and is asking to change his CODE STATUS to DNR CC.     ADVANCED CARE PLANNING:  Patient has capacity for medical decisions: no  Health Care Power of : no  Living Will: not asked     Personal, Social, and Family History  Marital Status:   Living situation:with family:  spouse  Importance of [place]/Amish/spiritual beliefs: [x] Very [] Somewhat [] Not   Patient is [employer] and has been anointed per wife. He appreciates visits/prayer. Psychological Distress: moderate  Does patient understand diagnosis/treatment? not asked  Does caregiver understand diagnosis/treatment?  yes      Assessment        REVIEW OF SYSTEMS- 1:1 sitter assisted  Constitutional: no fever, no chills or weight loss +Fatigue  Eyes: no eye pain or blurred vision  ENT: no hearing loss, congestion, or difficulty swallowing   Respiratory: no wheezing, chest tightness, +shortness of breath   Cardiovascular: no chest pain or pressure, no palpitations, no diaphoresis   Gastrointestinal: no nausea, vomiting, abdominal pain, diarrhea or constipation, no melena +poor appetite  Genitourinary: no dysuria, frequency,hematuria , or nocturia   Musculoskeletal: no myalgias or arthralgias, no back pain +generalized wakness  Skin: no rashes or sores   Neurological: no focal weakness, numbness, tingling, or headache, no seizures    PHYSICAL ASSESSMENT:  Constitutional: Alert and oriented to person, and place. Disoriented to time and circumstances. Head: Normocephalic and atraumatic. Eyes: EOM are normal. Pupils are equal, round   Neck: Normal range of motion. Neck supple. No tracheal deviation present. Cardiovascular: Normal rate and regular rhythm, S1, S2, no murmur   Pulmonary/Chest: +tachypnea and fatigue with talking. +HFNC 90% Fi02/60L and NRB. Abdomen: Soft. No tenderness, not distended, no ascites, no organomegaly   Musculoskeletal: Normal range of motion. +1 BLE edema  Neurological: CN II-XII grossly intact, no focal neurological deficits   Skin: Normal turgor, no bleeding, no bruising     Palliative Performance Scale:  ___60%  Ambulation reduced; Significant disease; Can't do hobbies/housework; intake normal or reduced; occasional assist; LOC full/confusion  ___50%  Mainly sit/lie; Extensive disease; Can't do any work; Considerable assist; intake normal or reduced; LOC full/confusion  ___40%  Mainly in bed; Extensive disease; Mainly assist; intake normal or reduced; LOC full/confusion   ___30%  Bed Bound; Extensive disease; Total care; intake reduced; LOCfull/confusion  _x__20%  Bed Bound; Extensive disease; Total care; intake minimal; Drowsy/coma  ___10%  Bed Bound; Extensive disease; Total care; Mouth care only; Drowsy/coma  ___0       Death      Plan      Palliative Interaction: I received call from [facility] with an update on patient's worsening respiratory status. Patient is a DNR CCA no intubation. We discussed new consult. I went to see patient, and received update from RN. Patient is now requiring HFNC and nonrebreather mask. He wore BiPAP last evening.   He has one-on-one sitter at bedside. Patient was sitting up in bed awake, and appeared fatigued/tired. I introduced myself to him, and the palliative role. I checked patient's orientation level, and he is oriented to person and place only. I attempted to discuss patient's worsening respiratory status, and the option to focus on comfort with hospice care. Patient reports that he does not understand what hospice care is. I discussed patient's current symptoms, and he reports having breathlessness, and is tired. I explained that we would focus on his comfort rather than aggressive measures, and use medications to help him feel less short of breath. Patient is agreeable to this, but not completely oriented. I attempted to ask him about his Parkinson's disease and baseline condition but patient stated that he does not have Parkinson's. I offered him support, and informed him that I would reach out to his wife today. Patient was able to tell writer that he was anointed, and would like spiritual care to visit. I placed consult. I reached out to patient's wife, and introduced myself to her and the palliative role. I asked her if she has been receiving updates, and she reports speaking with PCP approximately 2 days ago. I provided her with an update today, and discussed my visit with patient today. I confirmed with her that patient did not want CPR or intubation. Wife acknowledges that patient is going to die, and support was offered. I discussed the option for comfort care/hospice care to keep patient comfortable until end-of-life. Wife is wanting patient to be comfortable. I discussed patient's present CODE STATUS DNR CCA no intubation, and DNRCC CODE STATUS. She reports thinking that patient was a comfort care CODE STATUS, and would like him change. I explained comfort medications with her, and hospice in detail. Wife is agreeable with comfort medications being initiated, and hospice evaluation for likely bed conversion. She understands that if patient would stabilize that he would likely transfer to inpatient. She expressed just wanting patient comfortable until end-of-life. I discussed HC POA, and patient does not have 1. Wife reports to be legally  to patient for 20+ years. She reports not having any children with patient, but that he had a son prior. She reports that son is out of town in [place], but is aware of patient's condition. I discussed [place] next of kin law, and wife is aware that she is patient's legal next of kin. I discussed hospice companies, and wife would like consult to [facility] hospice. I placed referral, and they will be here tomorrow on 1/28 at 2:30 PM for evaluation. I discussed wife, and her support person to come in for a visit today. Wife reports that she will be up this afternoon. I updated Erika RN, and she is agreeable. I sent perfect serve to PCP to update.     Education/support to staff  Education/support to family  Education/support to patient  Communications with primary service  Providing support for coping/adaptation/distress of family  Discussing meaning/purpose   Specific spiritual beliefs/practices  Decision making regarding life prolonging treatment  Decisional capacity assessed  Continue with current plan of care  Clarification of medical condition to patient and family  Code status clarified: [facility]  Code status clarified: [facility]  Palliative care orders introduced  Provided information about hospice  Validating patient/family distress  Recognizing, reflecting, and empathizing with family members' anticipatory grief  Principle Problem/Diagnosis:  Pneumonia due to COVID-19 virus    Additional Assessments:   Principal Problem:    Pneumonia due to COVID-19 virus  Active Problems:    AMS (altered mental status)    COPD without exacerbation ([address]    A-fib ([address]    Gastroesophageal reflux disease without esophagitis    Neuropathy    JO (acute kidney injury) ([address]    Elevated troponin    Encephalopathy due to COVID-19 virus    Acute respiratory failure with hypercapnia (HCC)    Severe malnutrition (HCC)  Resolved Problems:    * No resolved hospital problems. *    1- Symptom management/ pain control     Pain Assessment:  The patient is not having any pain. Anxiety:  difficulty concentrating, fatigue, shortness of breath                          Dyspnea:  acute dyspnea- on HFNC and NRM                          Fatigue:  exercise intolerance    Other: Malnutrition- unable to eat/drinke    We feel the patient symptoms are being controlled. his current regimen is reviewed by myself and discussed with the staff. 2- Goals of care evaluation   The patient goals of care are provide comfort care/support/palliation/relieve suffering, preparation for death, achievement of a peaceful death, spiritual needs and support for family/caregiver   Goals of care discussed with:    [] Patient independently    [x] Patient and Family    [] Family or Healthcare DPOA independently    [] Unable to discuss with patient, family/DPOA not present    3- Code Status  DNR-CCA- changed to [facility] to respect pt/wife wishes    4- Other recommendations   - We will continue to provide comfort and support to the patient and the family  Please call with any palliative questions or concerns. Palliative Care Team is available via perfect serve or via phone. Palliative Care will continue to follow Mr. Valma Leyden care as needed. Thank you for allowing Palliative Care to participate in the care of Mr. Trey Reyes . This note has been dictated by dragon, typing errors may be a possibility. The total time I spent in seeing the patient, discussing goals of care, advanced directives, code status and other major issues was more than 70 minutes      Electronically signed by   PATRICK England - CNP  Palliative Care Team  on 1/27/2022 at 8:28 AM    Palliative Care can be reached via [employer].

## 2022-01-27 NOTE — CARE COORDINATION
Discharge planning     Patient chart reviewed. Patient condition changed. Now on bipap     COVID   HF 60L/90% FIO2 + non rebreather 15 L during day. BIPAP applied last night  Testing 1/8     [facility] is following. Patient is DNR CC A> palliative care consulted yesterday. Await their input     CARD 1/26  ASSESSMENT:  1.  Short nonsustained ventricular tachycardia  2.  History of atrial fibrillation, status post ablation in 2014  3.  Permanent pacemaker  4. Elevated cardiac enzymes  PLAN:  1. Maintain Mg2+ >2 and K+ >4.    2. Continue beta blocker therapy . Tachycardia is due to covid pneumonia and will not respond to bblockers. 3. Continue supportive therapy. Patient has poor prognosis

## 2022-01-28 PROBLEM — J96.01 ACUTE HYPOXEMIC RESPIRATORY FAILURE DUE TO COVID-19 (HCC): Status: ACTIVE | Noted: 2022-01-01

## 2022-01-28 PROBLEM — U07.1 ACUTE HYPOXEMIC RESPIRATORY FAILURE DUE TO COVID-19 (HCC): Status: ACTIVE | Noted: 2022-01-01

## 2022-01-28 NOTE — PLAN OF CARE
Problem: Non-Violent Restraints  Goal: Removal from restraints as soon as assessed to be safe  Outcome: Met This Shift  Note: Assessed for need for non-violent restraints to prevent self-harm from pulling on lines, Forman catheters, implanted ports, etc.  Assessed pt's ability to accept reorientation & verbal redirection from staff. Goal: No harm/injury to patient while restraints in use  Outcome: Met This Shift  Goal: Patient's dignity will be maintained  Outcome: Met This Shift     Problem: VIOLENT RESTRAINTS  Goal: Removal from restraints as soon as assessed to be safe  Outcome: Met This Shift  Note: Assessed for need for non-violent restraints to prevent self-harm from pulling on lines, Forman catheters, implanted ports, etc.  Assessed pt's ability to accept reorientation & verbal redirection from staff. Goal: No harm/injury to patient while restraints in use  Outcome: Met This Shift  Goal: Patient's dignity will be maintained  Outcome: Met This Shift     Problem: Pain:  Goal: Pain level will decrease  Description: Pain level will decrease  Outcome: Ongoing  Note: Monitoring pain with each assessment and prn. JA 0-10 pain scale utilized. Non-pharmacological measures to be encouraged prior to pharmacological measures. Goal: Control of acute pain  Description: Control of acute pain  Outcome: Ongoing  Goal: Control of chronic pain  Description: Control of chronic pain  Outcome: Ongoing     Problem: Skin Integrity:  Goal: Will show no infection signs and symptoms  Description: Will show no infection signs and symptoms  Outcome: Ongoing  Note: Monitor for signs & symptoms of infection. Utilize standard precautions & proper handwashing. Communicate referral to infection control. Goal: Absence of new skin breakdown  Description: Absence of new skin breakdown  Outcome: Ongoing  Note: Continuing to monitor for skin integrity risks. Patient independent with turning/repositioning.  Turning/repositioning encouraged at least once every 2 hrs, and prn basis. Hygiene care being completed independently per patient; assistance provided when deemed necessary. Problem: Falls - Risk of:  Goal: Will remain free from falls  Description: Will remain free from falls  Outcome: Ongoing  Note: Pt fall risk, fall band present, falling star, safety alarm activated and in use as needed. Hourly rounding performed. Pt encouraged to use call light. See [facility] Caraballo fall risk assessment. Goal: Absence of physical injury  Description: Absence of physical injury  Outcome: Ongoing  Note: Non-skid socks in place, up with assistance, bed in lowest position, bed exit & alarm as needed, provide toileting every 2 hours an d as needed. Problem: Airway Clearance - Ineffective  Goal: Achieve or maintain patent airway  Outcome: Ongoing  Note: Assessed ability to cough & breathe without laboring. Assessed need for suctioning if needed. Encouraged cough & deep breathing. Problem: Gas Exchange - Impaired  Goal: Absence of hypoxia  Outcome: Ongoing  Note: Assess breath sounds every shift and as needed. Assess oxygenation level & respiration rate. Encourage coughing & deep breathing. Encourage use of incentive spirometer. Assess cough & sputum. Administer oxygen as needed. Goal: Promote optimal lung function  Outcome: Ongoing     Problem: Breathing Pattern - Ineffective  Goal: Ability to achieve and maintain a regular respiratory rate  Outcome: Ongoing  Note: Assess for adventitious breath sounds. Monitored SaO2 > 90%. Applied 02 per nasal cannula as needed. Elevated HOB to improve breathing as needed. Problem: Body Temperature -  Risk of, Imbalanced  Goal: Ability to maintain a body temperature within defined limits  Outcome: Ongoing  Note: Assessed for fever. Provided appropriate intervention to regulate body temperature.    Goal: Will regain or maintain usual level of consciousness  Outcome: Ongoing  Goal: Complications related to the disease process, condition or treatment will be avoided or minimized  Outcome: Ongoing     Problem: Isolation Precautions - Risk of Spread of Infection  Goal: Prevent transmission of infection  Outcome: Ongoing  Note: Monitor for signs & symptoms of infection. Utilize standard precautions & proper handwashing. Communicate referral to infection control. Problem: Nutrition Deficits  Goal: Optimize nutritional status  Outcome: Ongoing  Note: Review diet daily and as needed with pt. Provide supplement nutrition as ordered by physician & educate pt. Review nutritional guidelines with pt. Problem: Risk for Fluid Volume Deficit  Goal: Maintain normal heart rhythm  Outcome: Ongoing  Note: Monitor vital signs at least every shift & as needed. Monitor intake & output at least every shift. Goal: Maintain absence of muscle cramping  Outcome: Ongoing  Goal: Maintain normal serum potassium, sodium, calcium, phosphorus, and pH  Outcome: Ongoing     Problem: Loneliness or Risk for Loneliness  Goal: Demonstrate positive use of time alone when socialization is not possible  Outcome: Ongoing  Note: Assess mood. Provide support & reassurance, & coping skills to aid with mood. Promote level of activity as pt is tolerable. Problem: Fatigue  Goal: Verbalize increase energy and improved vitality  Outcome: Ongoing     Problem: Patient Education: Go to Patient Education Activity  Goal: Patient/Family Education  Outcome: Ongoing  Note: Educated pt regarding reason for admission, treatment, medication. Provided oral and/or written instructions to treatment. Assessed level of verbal understanding of pt and/or family.       Problem: Confusion - Acute:  Goal: Absence of continued neurological deterioration signs and symptoms  Description: Absence of continued neurological deterioration signs and symptoms  Outcome: Ongoing  Goal: Mental status will be restored to baseline  Description: Mental status will be restored to baseline  Outcome: Ongoing     Problem: Discharge Planning:  Goal: Ability to perform activities of daily living will improve  Description: Ability to perform activities of daily living will improve  Outcome: Ongoing  Note: Discharge teaching and instructions for diagnosis/procedure explained with patient & wife using teachback method. Patient voiced understanding regarding prescriptions, follow up appointments, and care of self at home. Goal: Participates in care planning  Description: Participates in care planning  Outcome: Ongoing     Problem: Injury - Risk of, Physical Injury:  Goal: Will remain free from falls  Description: Will remain free from falls  Outcome: Ongoing  Note: Pt fall risk, fall band present, falling star, safety alarm activated and in use as needed. Hourly rounding performed. Pt encouraged to use call light. See [facility] fall risk assessment. Goal: Absence of physical injury  Description: Absence of physical injury  Outcome: Ongoing  Note: Non-skid socks in place, up with assistance, bed in lowest position, bed exit & alarm as needed, provide toileting every 2 hours an d as needed.       Problem: Mood - Altered:  Goal: Mood stable  Description: Mood stable  Outcome: Ongoing  Goal: Absence of abusive behavior  Description: Absence of abusive behavior  Outcome: Ongoing  Goal: Verbalizations of feeling emotionally comfortable while being cared for will increase  Description: Verbalizations of feeling emotionally comfortable while being cared for will increase  Outcome: Ongoing     Problem: Psychomotor Activity - Altered:  Goal: Absence of psychomotor disturbance signs and symptoms  Description: Absence of psychomotor disturbance signs and symptoms  Outcome: Ongoing     Problem: Sensory Perception - Impaired:  Goal: Demonstrations of improved sensory functioning will increase  Description: Demonstrations of improved sensory functioning will increase  Outcome: Ongoing  Goal: Decrease in sensory misperception frequency  Description: Decrease in sensory misperception frequency  Outcome: Ongoing  Goal: Able to refrain from responding to false sensory perceptions  Description: Able to refrain from responding to false sensory perceptions  Outcome: Ongoing  Goal: Demonstrates accurate environmental perceptions  Description: Demonstrates accurate environmental perceptions  Outcome: Ongoing  Goal: Able to distinguish between reality-based and nonreality-based thinking  Description: Able to distinguish between reality-based and nonreality-based thinking  Outcome: Ongoing  Goal: Able to interrupt nonreality-based thinking  Description: Able to interrupt nonreality-based thinking  Outcome: Ongoing     Problem: Sleep Pattern Disturbance:  Goal: Appears well-rested  Description: Appears well-rested  Outcome: Ongoing     Problem: SKIN INTEGRITY  Goal: LTG - patient will maintain/improve skin integrity through proper skin care techniques  Outcome: Ongoing     Problem: Nutrition  Goal: Optimal nutrition therapy  Outcome: Ongoing

## 2022-01-28 NOTE — PROGRESS NOTES
Per infection prevention, patient is okay to come out of droplet plus isolation for COVID.  Blood notified, order discontinued. Wife updated.

## 2022-01-28 NOTE — PROGRESS NOTES
[facility]  Office: [address], DO, Rayne Toth, DO, Rivas Danitza, DO, Kishor Avinacarmen Schafer, DO, Karen Aparicio MD, Mariano Jose MD, Delilah Lopez MD, Shannan Herman MD, Linnea Xiong MD, Ashley Lamar MD, Shahid Mitchell MD, Duane Clifton, DO, Grayson Gatica, DO, Russell Li MD,  Ivan Kelly DO, Mahi Jarrett MD, Lorenzo Morse MD, Kaila Mcdowell MD, Nick Pena MD, Niki Lopez MD, Nataliya Park MD, Ruben Castillo MD, Rufus Canada [facility], [facility], CNP, Meryl Dao, CNP, Shahid Laird, CNS, Barney Alvarez, CNP, Jose Neumann, CNP, Ailyn Andrews, CNP, Roxy Cohen, CNP, Fiona Gould, CNP, SARAI Mojica-C, Agustina Biswas, [facility], Anayeli Zimmerman, [facility], Luis A Husbands, CNP, Valentni Cadena, CNP, Gilberto Pa, CNP, Love Dominguez, CNP, Yamini Gomez, [facility], [facility]    Progress Note    1/28/2022    1:04 PM    Name:   Jorge L Smith  MRN:     [id number]     Acct:      [de-identified]   Room:   [address] [place] [address] Day:  21  Admit Date:  1/8/2022  3:33 PM    PCP:   Megha Avitia MD  Code Status:  DNR-CC    Subjective:     C/C:   Chief Complaint   Patient presents with   [facility] Altered Mental Status     Interval History Status: not changed. Remains confused but more talkative today    On hfnc    Brief History:     Per my BRYAN:  Erika Akers is a 59 y.o. Non- / non  male who presents with Altered Mental Status   and is admitted to the hospital for the management of COVID-19.     Since to the emergency room with altered mental status. Is confused and not able to provide any history for me. According to the ER nurse he was brought in by his wife after a fall at home. She states that he has been altered as well. No additional information available. He was found to have mild respiratory acidosis and started on BiPAP. Covid is positive.   He also had JO with BUN of 23 and creatinine of 3.09.  Previous kidney function has been normal.\"    Review of Systems:     unobtainable      Medications: Allergies: Allergies   Allergen Reactions    Latex     Bee Venom     Prednisone Other (See Comments)     Mood swings       Current Meds:   Scheduled Meds:    furosemide  40 mg IntraVENous Daily    piperacillin-tazobactam  4,500 mg IntraVENous Q8H    metoprolol tartrate  25 mg Oral BID    oxyCODONE  60 mg Oral TID    enoxaparin  30 mg SubCUTAneous BID    famotidine  20 mg Oral BID    QUEtiapine  100 mg Oral BID    budesonide  0.5 mg Nebulization BID    gabapentin  100 mg Oral TID    busPIRone  15 mg Oral Daily    DULoxetine  60 mg Oral Daily    finasteride  5 mg Oral Daily    cetirizine  10 mg Oral Daily    rivastigmine  1 patch TransDERmal Daily    [Held by provider] spironolactone  25 mg Oral BID    sodium chloride flush  5-40 mL IntraVENous 2 times per day    Vitamin D  2,000 Units Oral Daily     Continuous Infusions:    sodium chloride 25 mL (01/24/22 4986)     PRN Meds: glycopyrrolate, morphine **OR** morphine, ibuprofen, HYDROcodone-acetaminophen, aluminum & magnesium hydroxide-simethicone, potassium chloride **OR** potassium alternative oral replacement **OR** potassium chloride, albuterol, metoprolol, LORazepam **OR** LORazepam, sodium chloride flush, sodium chloride, ondansetron **OR** ondansetron, magnesium hydroxide, acetaminophen **OR** acetaminophen, guaiFENesin-dextromethorphan    Data:     Past Medical History:   has a past medical history of Anxiety, Arthritis, Atrial fib/flutter, transient, Bradycardia, COPD (chronic obstructive pulmonary disease) ([facility] [address] Dementia ([facility] [address] Depression, GERD (gastroesophageal reflux disease), Neuropathy, Parkinson's disease ([facility] [address] Pneumonia, and Syncope. Social History:   reports that he has quit smoking. His smoking use included cigarettes.  He has never used smokeless tobacco. He reports that he does not drink alcohol and does not use drugs. Family History:   Family History   Family history unknown: Yes       Vitals:  /64   Pulse 110   Temp 98.8 °F (37.1 °C) (Oral)   Resp 18   Ht 5' 11\" (1.803 m)   Wt 248 lb 1 oz (112.5 kg)   SpO2 (!) 87%   BMI 34.60 kg/m²   Temp (24hrs), Av.1 °F (36.7 °C), Min:97.5 °F (36.4 °C), Max:98.8 °F (37.1 °C)    No results for input(s): POCGLU in the last 72 hours. I/O (24Hr): Intake/Output Summary (Last 24 hours) at 2022 1304  Last data filed at 2022 0641  Gross per 24 hour   Intake 1030.61 ml   Output 450 ml   Net 580.61 ml       Labs:  Hematology:  No results for input(s): WBC, RBC, HGB, HCT, MCV, MCH, MCHC, RDW, PLT, MPV, SEDRATE, CRP, INR, DDIMER, [id number], LABABSO in the last 72 hours. Invalid input(s): PT  Chemistry:  Recent Labs     22  0606 22  0615 22  0546    139 141   K 3.3* 3.5* 3.0*   CL 96* 93* 94*   CO2 30 34* 37*   GLUCOSE 140* 125* 112*   BUN 42* 40* 38*   CREATININE 1.25* 1.23* 1.12   MG 2.4 2.2 2.4   ANIONGAP 15 12 10   LABGLOM 58* 59* >60   GFRAA >60 >60 >60   CALCIUM 9.0 9.4 9.0     No results for input(s): PROT, LABALBU, LABA1C, S5KTMYQ, L8YWXLQ, FT4, TSH, AST, ALT, LDH, GGT, ALKPHOS, LABGGT, BILITOT, BILIDIR, AMMONIA, AMYLASE, LIPASE, LACTATE, CHOL, HDL, LDLCHOLESTEROL, CHOLHDLRATIO, TRIG, VLDL, QRZ87XL, PHENYTOIN, PHENYF, URICACID, POCGLU in the last 72 hours.   ABG:  Lab Results   Component Value Date    POCPH 7.388 2022    POCPCO2 48.5 2022    POCPO2 50.0 2022    POCHCO3 29.2 2022    NBEA NOT REPORTED 2022    PBEA 3 2022    MRX9WOZ NOT REPORTED 2022    HLMD1UCC 84 2022    FIO2 100.0 2022     Lab Results   Component Value Date/Time    SPECIAL RT HAND 1ML 2022 02:08 AM    SPECIAL LT AC 8ML 2022 02:08 AM     Lab Results   Component Value Date/Time    CULTURE NO GROWTH 4 DAYS 2022 02:08 AM    CULTURE NO GROWTH 4 DAYS 2022 02:08 AM       Radiology:  XR ABDOMEN (KUB) (SINGLE AP VIEW)    Result Date: 1/16/2022  1. Unremarkable bowel gas pattern. XR ABDOMEN (KUB) (SINGLE AP VIEW)    Result Date: 1/11/2022  Nonobstructive bowel gas pattern. US LIVER    Result Date: 1/14/2022  Significantly limited study due to patient difficulty with cooperation during the imaging examination. Possible hepatic steatosis. Gallbladder, pancreas and common duct not visualized. RECOMMENDATIONS: Unavailable     XR CHEST PORTABLE    Result Date: 1/15/2022  Bronchial wall thickening with reticulonodular opacities in the right lung the left lung base, similar to prior. This may reflect infectious/inflammatory airways process it is not the typical appearance of atypical viral pneumonitis. XR CHEST PORTABLE    Result Date: 1/14/2022  No significant interval change, when compared to the previous study performed 01/12/2022. XR CHEST PORTABLE    Result Date: 1/12/2022  Mild interval increase in right lower lung field opacities, when compared to the previous study performed 1 day earlier. XR CHEST PORTABLE    Result Date: 1/11/2022  No significant interval change, when compared to the previous study performed 01/08/2022, as described above. US RETROPERITONEAL COMPLETE    Result Date: 1/10/2022  Nonvisualization of the right kidney. Limited exam as described.   Otherwise unremarkable exam. RECOMMENDATIONS: Unavailable       Physical Examination:        General appearance:  Awakens and no distress  Mental Status:  Awake and alert  Lungs:  clear to auscultation bilaterally, normal effort on hfnc  Heart:  regular rate and rhythm, no murmur  Abdomen:  soft, nontender, nondistended, normal bowel sounds, no masses, hepatomegaly, splenomegaly  Extremities:  no edema, redness, tenderness in the calves  Skin:  no gross lesions, rashes, induration    Assessment:        Hospital Problems           Last Modified POA    * (Principal) Pneumonia due to COVID-19 virus 1/9/2022 Yes    AMS (altered mental status) 1/9/2022 Yes    COPD without exacerbation ([address] 1/9/2022 Yes    A-fib ([address] 1/9/2022 Yes    Gastroesophageal reflux disease without esophagitis 1/9/2022 Yes    Neuropathy 1/9/2022 Yes    JO (acute kidney injury) ([address] 1/9/2022 Yes    Elevated troponin 1/9/2022 Yes    Encephalopathy due to COVID-19 virus 1/18/2022 Yes    Acute respiratory failure with hypercapnia ([address] 1/13/2022 Yes    Severe malnutrition ([address] 1/24/2022 Yes          Plan:        1. Wean hfnc/bipap as geeta; currently on hfnc 80%/50L-as he has been for over a week now  2. completed decadron per protocol  3. Monitor mental status  4. On zosyn for concerns of possible aspiration pneumonia  5. Hospice eval today  6. Appreciate pall care assistance  7. Remove droplet plus isolation  8.  D/w wife at bedside    Guillermo Ordoñez Blood, DO  1/28/2022  1:04 PM

## 2022-01-28 NOTE — PROGRESS NOTES
Patient requested & received PRN Morphine at [address] [facility], and scheduled PO Oxycontin at 2100. Patient's wife left for the night. Patient refuses to lie prone. SaO2 maintains at 85% on monitor, with heated high flow at 90% & 50L. Patient refuses BIPAP at this time. Resting comfortably with no apparent respiratory distress. Will continue to monitor & provide comfort care.

## 2022-01-28 NOTE — PROGRESS NOTES
Pulmonary Critical Care Progress Note  Christina Méndez CNP/Alejandro Diana MD     Patient seen for the follow up of acute hypoxic respiratory insufficiency, Pneumonia due to COVID-19 virus     Subjective:  No significant overnight events noted. He and his family had a meeting yesterday with palliative care and decided to change CODE STATUS to DNR CC with hospice meeting scheduled for this afternoon. He is on high flow nasal cannula, appears comfortable at this time. He denies significant change in his shortness of breath. No chest pain, occasional cough, mostly nonproductive. Examination:  Vitals: /64   Pulse 104   Temp 98.2 °F (36.8 °C) (Oral)   Resp 22   Ht 5' 11\" (1.803 m)   Wt 248 lb 1 oz (112.5 kg)   SpO2 94%   BMI 34.60 kg/m²   General appearance:   Awake, alert and cooperative with exam  Neck: No JVD  Lungs:  Decreased breath sounds no crackles or wheezing  Heart: regular rate and rhythm, S1, S2 normal, no gallop  Abdomen: Soft, non tender, + BS  Extremities: no cyanosis or clubbing.  No significant edema    LABs:  BMP:   Recent Labs     01/27/22  0615 01/28/22  0546    141   K 3.5* 3.0*   CO2 34* 37*   BUN 40* 38*   CREATININE 1.23* 1.12   LABGLOM 59* >60   GLUCOSE 125* 112*     ABG:  Lab Results   Component Value Date    RPG2NZK NOT REPORTED 01/22/2022    FIO2 100.0 01/22/2022       Lab Results   Component Value Date    POCPH 7.388 01/22/2022    POCPCO2 48.5 01/22/2022    POCPO2 50.0 01/22/2022    POCHCO3 29.2 01/22/2022    NBEA NOT REPORTED 01/22/2022    PBEA 3 01/22/2022    HHJ0RJW NOT REPORTED 01/22/2022    EVOP2NYM 84 01/22/2022    FIO2 100.0 01/22/2022       Radiology:  X-ray chest: 1/26/2022      Impression:  · Acute hypoxic respiratory  failure requiring BiPAP and high flow  · COVID-19  Pneumonia  · Altered mental status/encephalopathy  · Acute kidney injury  · Suspected obstructive sleep apnea/Obesity  · A. fib, anxiety, arthritis, GERD  · Elevated D-dimer  · Elevated liver function    Recommendations:  · Oxygen via high flow to keep saturation above 90%, currently on 60 L / 90% FiO2  · BiPAP while asleep and as needed  · Incentive spirometry every hour while wake  · DuoNeb by nebulizer 4 times daily  · Pulmicort 0.5 q.12 hours by nebulizer  · Zosyn  · Diuresis with IV Lasix, monitor renal function  · Seroquel 100 mg twice daily  · Monitor mental status  · Cardiology following  · Diet as tolerated, encourage oral intake  · Prone as tolerated  · Status post course of Decadron   · PT/OT  · Discussed with RN  · DVT prophylaxis on Lovenox 30 mg twice daily  · GI prophylaxis, Pepcid  · DNR CC, hospice meeting this afternoon  · Plans noted for palliative care meeting today  · We will follow with you    Electronically signed by     PATRICK Montejo CNP on 1/28/2022 at 8:59 AM  Pulmonary [address] and Sleep Medicine,  [facility] AT [place]: [phone]

## 2022-01-28 NOTE — PROGRESS NOTES
Section of Cardiology  Progress Note      Date:  1/28/2022  Patient: Trav Chapa  Admission:  1/8/2022  3:33 PM  Admit DX: Acute respiratory failure with hypercapnia (HCC) [J96.02]  Altered mental status, unspecified altered mental status type [R41.82]  AMS (altered mental status) [R41.82]  COVID-19 [U07.1]  Age:  59 y.o., 1960     LOS: 20 days     Reason for evaluation:   arrhythmia      SUBJECTIVE:     Notes and labs reviewed. Patient is now on comfort care. OBJECTIVE:    Telemetry: Sinus  /64   Pulse 110   Temp 98.8 °F (37.1 °C) (Oral)   Resp 18   Ht 5' 11\" (1.803 m)   Wt 248 lb 1 oz (112.5 kg)   SpO2 (!) 87%   BMI 34.60 kg/m²     Intake/Output Summary (Last 24 hours) at 1/28/2022 1324  Last data filed at 1/28/2022 0641  Gross per 24 hour   Intake 1030.61 ml   Output 450 ml   Net 580.61 ml       EXAM:   Physical exam was deferred due to Covid 19 isolation status and to minimize the spent of infection. Current Inpatient Medications:   furosemide  40 mg IntraVENous Daily    piperacillin-tazobactam  4,500 mg IntraVENous Q8H    metoprolol tartrate  25 mg Oral BID    oxyCODONE  60 mg Oral TID    enoxaparin  30 mg SubCUTAneous BID    famotidine  20 mg Oral BID    QUEtiapine  100 mg Oral BID    budesonide  0.5 mg Nebulization BID    gabapentin  100 mg Oral TID    busPIRone  15 mg Oral Daily    DULoxetine  60 mg Oral Daily    finasteride  5 mg Oral Daily    cetirizine  10 mg Oral Daily    rivastigmine  1 patch TransDERmal Daily    [Held by provider] spironolactone  25 mg Oral BID    sodium chloride flush  5-40 mL IntraVENous 2 times per day    Vitamin D  2,000 Units Oral Daily       IV Infusions (if any):   sodium chloride 25 mL (01/24/22 1439)       Diagnostics:   EKG: Sinus tachycardia. Labs:   CBC:   No results for input(s): WBC, HGB, HCT, PLT in the last 72 hours.   BMP:   Recent Labs     01/27/22  0615 01/28/22  0546    141   K 3.5* 3.0*   CO2 34* 37*   BUN 40* 38*   CREATININE 1.23* 1.12   LABGLOM 59* >60   GLUCOSE 125* 112*     No results found for: BNP  PT/INR: No results for input(s): PROTIME, INR in the last 72 hours. APTT:No results for input(s): APTT in the last 72 hours. CARDIAC ENZYMES:  No results for input(s): CKTOTAL, CKMB, CKMBINDEX, TROPONINT in the last 72 hours. FASTING LIPID PANEL:  Lab Results   Component Value Date    HDL 27 09/26/2020    TRIG 186 09/26/2020     LIVER PROFILE:No results for input(s): AST, ALT, LABALBU in the last 72 hours. ASSESSMENT:  1.  Short nonsustained ventricular tachycardia  2.  History of atrial fibrillation, status post ablation in 2014  3.  Permanent pacemaker  4.  Acute respiratory failure  5.  COVID-19 pneumonia  6.  Acute kidney injury, resolved now returned  8.  Altered mental status  9.  Elevated D-dimer  10.  Elevated cardiac enzymes  PLAN:  1. Patient will be considered for comfort care  Cardiology will sign off. Please see orders.  Discussed with nurse    Krysten Loving MD, MD

## 2022-01-28 NOTE — PROGRESS NOTES
Nutrition Note    Type and Reason for Visit: Reassess    Hospice status noted. Code status changed to DNR CC - hospice meeting scheduled for this afternoon No additional nutrition intervention is planned at this time. Patient will be followed based on length of stay, unless otherwise requested.       Rah Gupta RD, LD  Office Number: [phone]

## 2022-01-28 NOTE — DISCHARGE SUMMARY
[facility]  Office: [address], DO, Soila Lowry, DO, Holley Molina, DO, Hallie Schafer, DO, Indra Olguin MD, Arleen Choe MD, Winston Keenan MD, Odette Esquivel MD, Andrew Salas MD, Aster Hirsch MD, Musa Louie MD, Janey Summers, DO, Niya Rivera, DO, Harrietta Goldberg, MD,  Maximiliano Still, DO, Valarie Rosas MD, Vidhi Molina MD, Elkin Anthony MD, Hiram Arreguin MD, Donald Ponce MD, Grisleda Burns, [facility], [facility], CNP, Clay Amaya, CNP, Obdulio Lawrence, CNS, Bebe Celestin, CNP, Davian Sears, CNP, Alex Zhou, CNP, Susan Oliver, CNP, Sai Levy, CNP, [place] Shine PA-C, Haven Orellana, [facility], Davy Ahmadi, [facility], Shant Nicole, CNP, Zohaib Plush, CNP, Federico Humphrey, [facility], Paul Rees, CNP, Mabel Lopez, [facility], Raymon Howie, [facility]    Discharge Summary     Patient ID: Italo Oscar  :     MRN: [id number]     ACCOUNT:  [de-identified]   Patient's PCP: Rob Luna MD  Admit Date: 2022   Discharge Date: 2022     Length of Stay: 20  Code Status:  DNR-CC  Admitting Physician: Ernesto Banks DO  Discharge Physician: Catalino Schafer DO     Active Discharge Diagnoses:     Hospital Problem Lists:  Principal Problem:    Pneumonia due to COVID-19 virus  Active Problems:    AMS (altered mental status)    COPD without exacerbation ([facility]    A-fib ([facility]    Gastroesophageal reflux disease without esophagitis    Neuropathy    JO (acute kidney injury) ([facility] [address]    Elevated troponin    Encephalopathy due to COVID-19 virus    Acute respiratory failure with hypercapnia (HCC)    Severe malnutrition ([facility] [address]  Resolved Problems:    * No resolved hospital problems.  *      Admission Condition:  poor     Discharged Condition: poor    Hospital Stay:     Hospital Course:  Italo Oscar is a 59 y.o. male who was admitted for the management of  Pneumonia due to COVID-19 virus , presented to ER with Altered Mental Status    Admitted with ams and covid pneumonia with hypoxic resp failure. Placed on bipap (did not tolerate well) and high flow nc for many weeks at very high FiO2. He did not make any progress weaning the FiO2 and wife elected for hospice care. He is being converted to an inpatient hospice bed      Significant therapeutic interventions: see above    Significant Diagnostic Studies:   Labs / Micro:  CBC:   Lab Results   Component Value Date    WBC 12.0 01/25/2022    RBC 4.75 01/25/2022    RBC 4.23 03/08/2012    HGB 12.7 01/25/2022    HCT 41.7 01/25/2022    MCV 87.8 01/25/2022    MCH 26.7 01/25/2022    MCHC 30.5 01/25/2022    RDW 14.1 01/25/2022     01/25/2022     03/08/2012     CMP:    Lab Results   Component Value Date    GLUCOSE 112 01/28/2022    GLUCOSE 88 03/08/2012     01/28/2022    K 3.0 01/28/2022    CL 94 01/28/2022    CO2 37 01/28/2022    BUN 38 01/28/2022    CREATININE 1.12 01/28/2022    ANIONGAP 10 01/28/2022    ALKPHOS 71 01/16/2022     01/16/2022     01/16/2022    BILITOT 1.07 01/16/2022    LABALBU 3.5 01/16/2022    LABALBU 4.0 03/08/2012    ALBUMIN NOT REPORTED 01/16/2022    LABGLOM >60 01/28/2022    GFRAA >60 01/28/2022    GFR      01/28/2022    GFR NOT REPORTED 01/28/2022    PROT 6.5 01/16/2022    CALCIUM 9.0 01/28/2022        Radiology:  XR CHEST PORTABLE    Result Date: 1/26/2022  1. Persistent bilateral lung infiltrates, unchanged, compatible with multifocal pneumonia. There are probable small bilateral pleural effusions. XR CHEST PORTABLE    Result Date: 1/24/2022  Interval worsening of bilateral opacities consistent with multifocal airspace disease right greater than left. XR CHEST PORTABLE    Result Date: 1/22/2022  Patchy bilateral consolidative opacities right greater than left mildly worsened from the previous exam compatible with multifocal pneumonia.        Consultations:    Consults:     Final Specialist Recommendations/Findings:   IP CONSULT TO HOSPITALIST  IP CONSULT TO NEPHROLOGY  IP CONSULT TO PULMONOLOGY  IP CONSULT TO CARDIOLOGY  IP CONSULT TO PALLIATIVE CARE  IP CONSULT TO HOSPICE  IP CONSULT TO SPIRITUAL SERVICES  IP CONSULT TO HOSPITALIST  IP CONSULT TO HOSPICE      The patient was seen and examined on day of discharge and this discharge summary is in conjunction with any daily progress note from day of discharge.     Discharge plan:     Disposition: Discharge/Readmit    Physician Follow Up:          Requiring Further Evaluation/Follow Up POST HOSPITALIZATION/Incidental Findings: comfort measures    Diet: regular diet    Activity: As tolerated    Instructions to Patient: take medications as prescribed      Discharge Medications:      Medication List      STOP taking these medications    rivaroxaban 20 MG Tabs tablet  Commonly known as: Xarelto        ASK your doctor about these medications    albuterol (2.5 MG/3ML) 0.083% nebulizer solution  Commonly known as: PROVENTIL     azithromycin 500 MG tablet  Commonly known as: Espiridion Pb  Ask about: Which instructions should I use?     budesonide 0.5 MG/2ML nebulizer suspension  Commonly known as: PULMICORT     busPIRone 15 MG tablet  Commonly known as: BUSPAR     diazePAM 10 MG tablet  Commonly known as: VALIUM     DULoxetine 60 MG extended release capsule  Commonly known as: CYMBALTA     famotidine 40 MG tablet  Commonly known as: PEPCID     finasteride 5 MG tablet  Commonly known as: PROSCAR     gabapentin 600 MG tablet  Commonly known as: NEURONTIN     HYDROcodone-acetaminophen  MG per tablet  Commonly known as: NORCO     ipratropium 0.02 % nebulizer solution  Commonly known as: ATROVENT     loratadine 10 MG tablet  Commonly known as: CLARITIN     midodrine 2.5 MG tablet  Commonly known as: PROAMATINE     OxyCONTIN 60 MG T12a extended release tablet  Generic drug: oxyCODONE     QUETIAPINE FUMARATE PO     rivastigmine 4.6 MG/24HR  Commonly known as: EXELON     spironolactone 25 MG tablet  Commonly known as: ALDACTONE     Vitamin D3 50 MCG (2000 UT) Caps            No discharge procedures on file. Time Spent on discharge is  20 mins in patient examination, evaluation, counseling as well as medication reconciliation, prescriptions for required medications, discharge plan and follow up. Electronically signed by   Catalino Schafer DO  1/28/2022  3:31 PM      Thank you Dr. Rob Luna MD for the opportunity to be involved in this patient's care.

## 2022-01-28 NOTE — PROGRESS NOTES
..    Palliative Care Progress Note    NAME:  Zeferino Cope  MEDICAL RECORD NUMBER:  [id number]  AGE: 59 y.o. GENDER: male  : 1960  TODAY'S DATE:  2022    Reason for Consult:  hospice discussion, symptom management, and support. History of Present Illness     The patient is a 59 y.o. Non- / non  male who presents with Altered Mental Status      Referred to Palliative Care by  [x] Physician   [] Nursing  [] Family Request   [] Other:       He was admitted to the primary service for Acute respiratory failure with hypercapnia (HCC) [J96.02]  Altered mental status, unspecified altered mental status type [R41.82]  AMS (altered mental status) [R41.82]  COVID-19 [U07.1]. His hospital course has been associated with Pneumonia due to COVID-19 virus, AMS, acute respiratory failure with hypoxia and hypercapnia, JO, elevated troponin, elevated D-dimer, elevated inflammatory markers, hypotension, mild hyponatremia, and short nonsustained ventricular tachycardia. The patient has a complicated medical history and has been hospitalized since 2022  3:33 PM. Patient is a [facility] code status, and hospice meeting planned today with wife/patient at 2:30PM. Palliative care following for symptom management, and support. OVERNIGHT EVENTS: Patient has weaned off NRB mask and is down to 80% Fi02 on HFNC. Patient reports pain is controlled. Wife at bedside currently.  pertinent labs include; BUN 38, potassium 3,     /64   Pulse 110   Temp 98.8 °F (37.1 °C) (Oral)   Resp 18   Ht 5' 11\" (1.803 m)   Wt 248 lb 1 oz (112.5 kg)   SpO2 (!) 87%   BMI 34.60 kg/m²     Assessment        REVIEW OF SYSTEMS    [x]   UNABLE TO OBTAIN: Patient is groggy, but did open eyes to verbal stimuli for small amount of time. He reported pain being controlled.      Constitutional:  []   Chills   []  Fatigue   []  Fevers   []  Malaise   []  Weight loss   [] Other:     Respiratory:   []  Cough    []  Shortness of breath    []  Chest pain    [] Other:     Cardiovascular:   []  Chest pain  []  Dyspnea    []  Exertional chest pressure/discomfort     [] Fatigue      []  Palpitations    []  Syncope   [] Other:     Gastrointestinal:   []  Abdominal pain   []  Constipation    []  Diarrhea    []   Dysphagia   []  Reflux             []  Vomiting   [] Other:     Genitourinary:  []  Dysuria     []  Frequency   []  Hematuria   [] Nocturia   []  Urinary incontinence   [] Other:     Musculoskeletal:   [] Back pain    []  Muscle weakness   []  Myalgias    []  Neck pain   []  Stiff joints   []  Other:     Behavioral/Psych:   [] Anxiety    []  Depression     []  Mood swings   [] Other:     PHYSICAL ASSESSMENT:     General: []  Oriented x3      [] well appearing      [] Intubated      [x] ill appearing      [] Other:    Mental Status: [] normal mental status exam      [x] drowsy      [] Confused      [] Other:     Cardiovascular: [x]  Regular rate/rhythm      [] Arrhythmia      [] Other:     Chest: [x] Effort normal      [] lungs clear      [] respiratory distress      [] Tachypnea      [x]  Other: On HFNC 80%    Abdomen: [] Soft/non-tender      []  Normal appearance      [] Distended      [] Ascites      [] Other:    Neurological: [x] Normal Speech      [] Normal Sensation      []  Deficits present:      Extremity:  [] normal skin color/temp      [] clubbing/cyanosis      []  No edema      [x] Other: Pale    Palliative Performance Scale:  ___60%  Ambulation reduced; Significant disease; Can't do hobbies/housework; intake normal or reduced; occasional assist; LOC full/confusion  ___50%  Mainly sit/lie; Extensive disease; Can't do any work; Considerable assist; intake normal or reduced; LOC full/confusion  ___40%  Mainly in bed; Extensive disease; Mainly assist; intake normal or reduced; LOC full/confusion   ___30%  Bed Bound; Extensive disease; Total care; intake reduced; LOCfull/confusion  _x__20%  Bed Bound; Extensive disease;  Total care; intake minimal; Drowsy/coma  ___10%  Bed Bound; Extensive disease; Total care; Mouth care only; Drowsy/coma  ___0       Death      Plan      Palliative Interaction: I went to see patient, and he was sitting up in bed on HFNC. Wife is at bedside in wheelchair. Received update from RN, and he reports that patient was weaned off of nonrebreather mask, and FiO2 down to 80%. He discusses getting patient down to nasal cannula, and we discussed the importance of weaning him if tolerated by 15% and using comfort meds if symptomatic. Hospice meeting planned at 2:30 PM today. I discussed bed conversion versus inpatient transfer with wife. We explained that if patient is on too high of oxygen/unstable to transfer then patient would be converted, but if able to wean oxygen down and patient is stable then patient would transfer inpatient. I informed her that hospice will evaluate patient today and further explain. We discussed patient's comfort medications, and wife reports him being effective. Patient reported pain being minimal and controlled. I offered him support at this time.     Education/support to staff  Education/support to family  Communications with primary service  Pharmacologic pain management  Providing support for coping/adaptation/distress of family  Caregiver support/education  Continue with current plan of care  Provided information about hospice  Validating patient/family distress  Continued communication updates  Recognizing, reflecting, and empathizing with family members' anticipatory grief  Principle Problem/Diagnosis:  Pneumonia due to COVID-19 virus    Additional Assessments:  Principal Problem:    Pneumonia due to COVID-19 virus  Active Problems:    AMS (altered mental status)    COPD without exacerbation ([address]    A-fib ([address]    Gastroesophageal reflux disease without esophagitis    Neuropathy    JO (acute kidney injury) ([facility] [address]    Elevated troponin    Encephalopathy due to COVID-19 virus    Acute respiratory failure with hypercapnia (HCC)    Severe malnutrition (HCC)  Resolved Problems:    * No resolved hospital problems. *    1- Symptom management/ pain control     Pain Assessment:  Pain is controlled with current analgesics. Medication(s) being used: narcotic analgesics including MS PRN. Anxiety:  none                          Dyspnea:  acute dyspnea- on HFNC                          Fatigue: Other: We feel the patient symptoms are being controlled. his current regimen is reviewed by myself and discussed with the staff. 2- Goals of care evaluation   The patient goals of care are provide comfort care/support/palliation/relieve suffering, achievement of a peaceful death and support for family/caregiver   Goals of care discussed with:    [] Patient independently    [] Patient and Family    [x] Family or Healthcare DPOA independently    [] Unable to discuss with patient, family/DPOA not present    3- Code Status  DNR-CC    4- Other recommendations  - We will continue to provide comfort and support to the patient and the family    Please call with any palliative questions or concerns. Palliative Care Team is available via perfect serve or via phone. Palliative Care will continue to follow Mr. [facility] as needed. The note has been dictated by dragon, typing errors may be a possibility     Thank you for allowing Palliative Care to participate in the care of Mr. Daniel Gould . Electronically signed by   PATRICK Darden - [facility]  Palliative Care Team  on 1/28/2022 at 12:12 PM    Palliative care can be reached via Rising.

## 2022-01-29 NOTE — PROGRESS NOTES
Patient asleep with respirations greater than 12. Appears to be resting comfortably. Spouse to floor and woke patient up to see if he has pain and states patient is in pain. Pain medication given. Spouse upset and states he is always in pain and is requesting pain medications every hour.

## 2022-01-29 NOTE — H&P
[facility]  Office: [address], DO, Hernando Eli, DO, Roseanne Desouza, DO, John Vivas Blood, DO, Jyoti Ortiz MD, Maurilio Palma MD, Gustavo Espinal MD, Macrina Dee MD, Cristina Kaminski MD, Tim Enriquez MD, Cristina Hill MD, Christine Lobato, DO, Jeremiah Black, DO, Robby Garza MD,  Sheila Carrillo DO, Fiona Pulliam MD, Tiffanie Hoffmann MD, Edu Salcedo MD, Rafa Grant MD, Blake Lamar MD, Darling Faustin, [facility], [facility], CNP, Janet Barragan, CNP, Jose Ambrocio, CNS, Kristian Bai, CNP, Beryle Fix, CNP, Virginie Ramirez, CNP, Sanchez Aguirre, CNP, Cal Ratliff, CNP, Nino Goldberg PA-C, Tomas Villegas, DNP, Wan Draper, DNP, Antonella Cabrera, CNP, Anibal Mendez, CNP, Massiel Wu, CNP, Morgan Barr, CNP, Keith Rivera, CNP, Rosalinda Zeng, CNP         [facility]    HISTORY AND PHYSICAL EXAMINATION            Date:   1/29/2022  Patient name:  Crista Harvey  Date of admission:  1/28/2022  4:22 PM  MRN:   [id number]  Account:  [de-identified]  YOB: 1960  PCP:    Maria A Pizano MD  Room:   [address] [place] [address]-  Code Status:    DNR-CC    Chief Complaint:     Sob/covid    History Obtained From:     electronic medical record    History of Present Illness:     Crista Harvey is a 59 y.o. Non- / non  male who presents with No chief complaint on file. and is admitted to the hospital for the management of Acute hypoxemic respiratory failure due to COVID-19 [facility]). Admitted with ams and covid pneumonia with hypoxic resp failure. Placed on bipap (did not tolerate well) and high flow nc for many weeks at very high FiO2. He did not make any progress weaning the FiO2 and wife elected for hospice care.   He is being converted to an inpatient hospice bed    Past Medical History:     Past Medical History:   Diagnosis Date    Anxiety     Arthritis     Atrial fib/flutter, transient     Bradycardia     COPD (chronic (NEURONTIN) 600 MG tablet Take 1,200 mg by mouth 3 times daily. Takes 2 tabs (=1200mg) TID    Historical Provider, MD   busPIRone (BUSPAR) 15 MG tablet Take 15 mg by mouth daily. Historical Provider, MD   DULoxetine (CYMBALTA) 60 MG capsule Take 60 mg by mouth daily. Historical Provider, MD   QUETIAPINE FUMARATE PO Take 75 mg by mouth nightly Takes 25mg + 50mg tabs for 75mg nightly dose    Historical Provider, MD   diazepam (VALIUM) 10 MG tablet Take 10 mg by mouth 4 times daily as needed for Anxiety. Historical Provider, MD   OxyCODONE HCl ER (OXYCONTIN) 60 MG T12A Take 60 mg by mouth 3 times daily. Historical Provider, MD   midodrine (PROAMATINE) 2.5 MG tablet Take 2.5 mg by mouth 3 times daily as needed. Takes one or 2 tabs 3x day    Historical Provider, MD        Allergies:     Latex, Bee venom, and Prednisone    Social History:     Tobacco:    reports that he has quit smoking. His smoking use included cigarettes. He has never used smokeless tobacco.  Alcohol:      reports no history of alcohol use. Drug Use:  reports no history of drug use. Family History:     Family History   Family history unknown: Yes       Review of Systems:     unobtainable  Other than he is sob when o2 comes off    Physical Exam:   /73   Pulse 109   Temp 97.7 °F (36.5 °C) (Oral)   Resp 20   Wt 248 lb 3.2 oz (112.6 kg)   SpO2 93%   BMI 34.62 kg/m²   Temp (24hrs), Av.1 °F (36.7 °C), Min:97.2 °F (36.2 °C), Max:98.8 °F (37.1 °C)    No results for input(s): POCGLU in the last 72 hours.   No intake or output data in the 24 hours ending 22 1037    General Appearance:  alert, ill appearing, and in no acute distress  Mental status: oriented to person, place  Head:  normocephalic, atraumatic  Eye: no icterus, redness, pupils equal and reactive, extraocular eye movements intact, conjunctiva clear  Ear: normal external ear, no discharge, hearing intact  Nose:  no drainage noted  Mouth: mucous membranes moist  Neck: supple, no carotid bruits, thyroid not palpable  Lungs: Bilateral equal air entry, clear to auscultation, no wheezing, rales or rhonchi, normal effort on hfnc  Cardiovascular: normal rate, regular rhythm, no murmur, gallop, rub. Abdomen: Soft, nontender, nondistended, normal bowel sounds, no hepatomegaly or splenomegaly  Neurologic: There are no new focal motor or sensory deficits, normal muscle tone and bulk, no abnormal sensation, normal speech, cranial nerves II through XII grossly intact  Skin: No gross lesions, rashes, bruising or bleeding on exposed skin area  Extremities:  peripheral pulses palpable, no pedal edema or calf pain with palpation  Psych: normal affect     Investigations:      Laboratory Testing:  No results found for this or any previous visit (from the past 24 hour(s)). Imaging/Diagnostics:  XR CHEST PORTABLE    Result Date: 1/26/2022  1. Persistent bilateral lung infiltrates, unchanged, compatible with multifocal pneumonia. There are probable small bilateral pleural effusions. XR CHEST PORTABLE    Result Date: 1/24/2022  Interval worsening of bilateral opacities consistent with multifocal airspace disease right greater than left. XR CHEST PORTABLE    Result Date: 1/22/2022  Patchy bilateral consolidative opacities right greater than left mildly worsened from the previous exam compatible with multifocal pneumonia. Assessment :      Hospital Problems           Last Modified POA    * (Principal) Acute hypoxemic respiratory failure due to COVID-19 [facility]) 1/28/2022 Yes    COPD without exacerbation ([facility] [address] 1/28/2022 Yes    A-fib ([facility] [address] 1/28/2022 Yes    Pneumonia due to COVID-19 virus 1/28/2022 Yes    Severe malnutrition ([facility] [address] 1/28/2022 Yes          Plan:     Patient status inpatient in the Med/Surge    1. Hospice care as inpatient  2. Transfer to hospice facility when bed available  3. Comfort measures  4.  Wean hfnc as able    Consultations:   IP CONSULT TO HOSPICE  IP

## 2022-01-29 NOTE — PROGRESS NOTES
Patient yelled out. Writer entered the room and patient had High Flow NC out of nose and above nose. He stated that he couldn't breathe. We placed the NC back into his nostrils. And patient was able to breathe. Patient asked that I stayed in the room with him because he was afraid. I stayed with patient for 10 minutes.

## 2022-01-29 NOTE — PLAN OF CARE
Pain level assessment complete. Patient educated on pain scale and control interventions  PRN pain medication given per patient request  Patient instructed to call out with new onset of pain or unrelieved pain  Siderails up x 2  Hourly rounding  Call light in reach  Instructed to call for assist before attempting out of bed.   Remains free from falls and accidental injury at this time   Floor free from obstacles  Bed is locked and in lowest position  Adequate lighting provided  Bed alarm on, Red Falling star and Stay with Me signs posted

## 2022-01-29 NOTE — PROGRESS NOTES
Patient transferred to 2018 with a 15L non-re breather, discussion with wife that he will deteriorate off of the high flow and BiPAP. Wife agreeable to keep him on NRB and keep him comfortable as of his [facility]. Respiratory information given to receiving nurse.

## 2022-01-29 NOTE — FLOWSHEET NOTE
Patient is to go into Hospice. Patient was passive. States fears about dying and going to [place],  explores his worries and fears.  shared his belief in the true unconditional love of God.  shard in presence, listening, presence, prayers, communion. Follow up as needed. 01/29/22 1420   Encounter Summary   Services provided to: Patient   Referral/Consult From: Rounding;Palliative Care;Nurse   Continue Visiting   (1-29-22)   Complexity of Encounter Moderate   Length of Encounter 30 minutes   Spiritual Assessment Completed Yes   Routine   Type Follow up   [address] Patient received communion   Grief and Life Adjustment   Type Hospice   Assessment Passive; Anxious; Fearful   Intervention Active listening;Explored feelings, thoughts, concerns;Prayer;Sustaining presence/ Ministry of presence; Discussed illness/injury and it's impact; Discussed belief system/Zoroastrian practices/quincy;Discussed relationship with God;End of life care; Discussed death   Outcome Expressed gratitude;Receptive;Engaged in conversation;Expressed feelings/needs/concerns

## 2022-01-29 NOTE — PROGRESS NOTES
Report given to [facility]. Patient transferred on non-rebreathe to room 2018. Patient and wife agree to stay on non-rebreathe at 15 L. Patient and wife in agreement to weaning down and again spoke of wanting DNR-CC.

## 2022-01-29 NOTE — CARE COORDINATION
Pt was converted to Hospice bed 1-28 with [facility]. Spoke to Ray in intake at [facility] and inpatient beds are full at this time. Oxygen needs to be assessed prior to transfer. Currently on heated high flow nasal cannula at 50L/min at 80% oxygen.

## 2022-01-29 NOTE — PROGRESS NOTES
Wife calling out for pain medications. Writer to room with scheduled medications(oxycodone). Patient advised of medications and is agreeable. Wife is upset wants morphine for patient. Writer explained scheduled medications will control pain and IV medications for breakthrough pain.

## 2022-01-29 NOTE — PROGRESS NOTES
Transferred into room 2018 from PCU. Alert, talking. Wife at bedside. O2 at 15L non rebreather mask. geeta sips thickened water by spoon. HOB at 90 degrees.

## 2022-01-30 NOTE — PROGRESS NOTES
Patient ordered morphine q-1hr prn and ativan q-2hr prn and given consistently by writer for patient comfort. With all interventions, patient is still groaning in bed, reduced neuro check. Dr. Schafer contacted to change pain regimend. Hospice called writer via telephone per family request. [address] Barrios reported order change to hospice RN. After writer discussed patient changes, hospice RN reported to writer that a hospice RN will be stopping in to see patient for suggestions.

## 2022-01-30 NOTE — PROGRESS NOTES
[facility]  Office: [address], DO, Hernando Sreedhar, DO, Roseanne Desouza, DO, John Sangeetha Blood, DO, Jyoti Ortiz MD, Maurilio Palma MD, Gustavo Espinal MD, Macrina Dee MD, Cristina Kaminski MD, Tim Enriquez MD, Cristina Hill MD, Christine Lobato, DO, Jeremiah Black, DO, Robby Garza MD,  Sheila Carrillo, DO, Fiona Pulliam MD, Dianna Ramires MD, Tiffanie Hoffmann MD, Edu Salcedo MD, Rafa Grant MD, Blake Lamar MD, Valentin Meade MD, Darling Faustin, Palomo Syed, CNP, Janet Barragan, CNP, Jose Ambrocio, CNS, Kristian Bai, CNP, Beryle Fix, CNP, Virginie Ramirez, CNP, Sanchez Aguirre, CNP, Cal Ratliff, CNP, FELA PedrazaC, Tomas Villegas, DNP, Wan Draper, DNP, Antonella Cabrera, CNP, Anibal Mendez, CNP, Massiel Wu, CNP, Morgan Barr, CNP, Keith Rivera, CNP, Rosalinda Zeng, [facility]    Progress Note    1/30/2022    8:26 AM    Name:   Crista Harvey  MRN:     [id number]     [place]:      [de-identified]   Room:   2018/2018-02  IP Day:  2  Admit Date:  1/28/2022  4:22 PM    PCP:   Maria A Pizano MD  Code Status:  DNR-CC    Subjective:     C/C: sob    Interval History Status: not changed. Remains confused but more talkative today    Wants to go home    Brief History:     Per my BRYAN:  Ninoska Sidhu is a 59 y.o. Non- / non  male who presents with Altered Mental Status   and is admitted to the hospital for the management of COVID-19.     Since to the emergency room with altered mental status. Is confused and not able to provide any history for me. According to the ER nurse he was brought in by his wife after a fall at home. She states that he has been altered as well. No additional information available. He was found to have mild respiratory acidosis and started on BiPAP. Covid is positive. He also had JO with BUN of 23 and creatinine of 3.09.   Previous kidney function has been normal.\"    Review of Systems:     unobtainable      Medications: Allergies: Allergies   Allergen Reactions    Latex     Bee Venom     Prednisone Other (See Comments)     Mood swings       Current Meds:   Scheduled Meds:    sodium chloride flush  5-40 mL IntraVENous 2 times per day    budesonide  0.5 mg Nebulization BID    busPIRone  15 mg Oral Daily    cetirizine  10 mg Oral Daily    DULoxetine  60 mg Oral Daily    famotidine  20 mg Oral BID    finasteride  5 mg Oral Daily    gabapentin  100 mg Oral TID    metoprolol tartrate  25 mg Oral BID    oxyCODONE  60 mg Oral TID    QUEtiapine  100 mg Oral BID    rivastigmine  1 patch TransDERmal Daily     Continuous Infusions:    sodium chloride       PRN Meds: glycopyrrolate, sodium chloride, acetaminophen **OR** acetaminophen, magnesium hydroxide, ondansetron **OR** ondansetron, sodium chloride flush, guaiFENesin-dextromethorphan, albuterol, aluminum & magnesium hydroxide-simethicone, HYDROcodone-acetaminophen, ibuprofen, LORazepam **OR** LORazepam, morphine **OR** morphine    Data:     Past Medical History:   has a past medical history of Anxiety, Arthritis, Atrial fib/flutter, transient, Bradycardia, COPD (chronic obstructive pulmonary disease) ([facility] Dementia ([facility] Depression, GERD (gastroesophageal reflux disease), Neuropathy, [facility]'s [facility] Pneumonia, and Syncope. Social History:   reports that he has quit smoking. His smoking use included cigarettes. He has never used smokeless tobacco. He reports that he does not drink alcohol and does not use drugs. Family History:   Family History   Family history unknown: Yes       Vitals:  BP 93/65   Pulse 110   Temp 99.7 °F (37.6 °C) (Axillary)   Resp 24   Wt 248 lb 3.2 oz (112.6 kg)   SpO2 (!) 77%   BMI 34.62 kg/m²   Temp (24hrs), Av.6 °F (37 °C), Min:97.9 °F (36.6 °C), Max:99.7 °F (37.6 °C)    No results for input(s): POCGLU in the last 72 hours. I/O (24Hr):   No intake or output data in the 24 hours ending 01/30/22 0826    Labs:  Hematology:  No results for input(s): WBC, RBC, HGB, HCT, MCV, MCH, MCHC, RDW, PLT, MPV, SEDRATE, CRP, INR, DDIMER, RF7EQAXW, LABABSO in the last 72 hours. Invalid input(s): PT  Chemistry:  Recent Labs     01/28/22  0546      K 3.0*   CL 94*   CO2 37*   GLUCOSE 112*   BUN 38*   CREATININE 1.12   MG 2.4   ANIONGAP 10   LABGLOM >60   GFRAA >60   CALCIUM 9.0     No results for input(s): PROT, LABALBU, LABA1C, L8QQIOJ, P9YEFIO, FT4, TSH, AST, ALT, LDH, GGT, ALKPHOS, LABGGT, BILITOT, BILIDIR, AMMONIA, AMYLASE, LIPASE, LACTATE, CHOL, HDL, LDLCHOLESTEROL, CHOLHDLRATIO, TRIG, VLDL, CXG76LV, PHENYTOIN, PHENYF, URICACID, POCGLU in the last 72 hours. ABG:  Lab Results   Component Value Date    POCPH 7.388 01/22/2022    POCPCO2 48.5 01/22/2022    POCPO2 50.0 01/22/2022    POCHCO3 29.2 01/22/2022    NBEA NOT REPORTED 01/22/2022    PBEA 3 01/22/2022    PYU3DBS NOT REPORTED 01/22/2022    UKID0JDE 84 01/22/2022    FIO2 100.0 01/22/2022     Lab Results   Component Value Date/Time    SPECIAL RT HAND 1ML 01/24/2022 02:08 AM    SPECIAL LT AC 8ML 01/24/2022 02:08 AM     Lab Results   Component Value Date/Time    CULTURE NO GROWTH 5 DAYS 01/24/2022 02:08 AM    CULTURE NO GROWTH 5 DAYS 01/24/2022 02:08 AM       Radiology:  XR ABDOMEN (KUB) (SINGLE AP VIEW)    Result Date: 1/16/2022  1. Unremarkable bowel gas pattern. XR ABDOMEN (KUB) (SINGLE AP VIEW)    Result Date: 1/11/2022  Nonobstructive bowel gas pattern. US LIVER    Result Date: 1/14/2022  Significantly limited study due to patient difficulty with cooperation during the imaging examination. Possible hepatic steatosis. Gallbladder, pancreas and common duct not visualized. RECOMMENDATIONS: Unavailable     XR CHEST PORTABLE    Result Date: 1/15/2022  Bronchial wall thickening with reticulonodular opacities in the right lung the left lung base, similar to prior.   This may reflect infectious/inflammatory airways process it is not the typical appearance of atypical viral pneumonitis. XR CHEST PORTABLE    Result Date: 1/14/2022  No significant interval change, when compared to the previous study performed 01/12/2022. XR CHEST PORTABLE    Result Date: 1/12/2022  Mild interval increase in right lower lung field opacities, when compared to the previous study performed 1 day earlier. XR CHEST PORTABLE    Result Date: 1/11/2022  No significant interval change, when compared to the previous study performed 01/08/2022, as described above. US RETROPERITONEAL COMPLETE    Result Date: 1/10/2022  Nonvisualization of the right kidney. Limited exam as described. Otherwise unremarkable exam. RECOMMENDATIONS: Unavailable       Physical Examination:        General appearance:  Awakens and no distress  Mental Status:  Awake and alert  Lungs:  clear to auscultation bilaterally, normal effort  Heart:  regular rate and rhythm, no murmur  Abdomen:  soft, nontender, nondistended, normal bowel sounds, no masses, hepatomegaly, splenomegaly  Extremities:  no edema, redness, tenderness in the calves  Skin:  no gross lesions, rashes, induration    Assessment:        Hospital Problems           Last Modified POA    * (Principal) Acute hypoxemic respiratory failure due to COVID-19 ([address] 1/28/2022 Yes    COPD without exacerbation ([address] 1/28/2022 Yes    A-fib ([address] 1/28/2022 Yes    Pneumonia due to COVID-19 virus 1/28/2022 Yes    Severe malnutrition ([address] 1/28/2022 Yes          Plan:        1. Wean o2 as geeta; currently on simple mask  2. completed decadron per protocol  3. Monitor mental status  4. Dc planning to hospice when bed available  5.  Comfort measures    Leafy Barak Blood, DO  1/30/2022  8:26 AM

## 2022-01-30 NOTE — PLAN OF CARE
Problem: Pain:  Goal: Pain level will decrease  Description: Pain level will decrease  Outcome: Ongoing     Problem: Pain:  Goal: Control of acute pain  Description: Control of acute pain  Outcome: Ongoing     Problem: Falls - Risk of:  Goal: Will remain free from falls  Description: Will remain free from falls  Outcome: Ongoing     Problem: Falls - Risk of:  Goal: Absence of physical injury  Description: Absence of physical injury  Outcome: Ongoing     Problem: Skin Integrity:  Goal: Will show no infection signs and symptoms  Description: Will show no infection signs and symptoms  Outcome: Ongoing     Problem: Skin Integrity:  Goal: Absence of new skin breakdown  Description: Absence of new skin breakdown  Outcome: Ongoing

## 2022-01-30 NOTE — PROGRESS NOTES
Rounding on patient often. Checking for Non Rebreather mask placement on patient . Mask has a tendency to slide down patient's face. Patient is a mouth breather. Patient doesn't feel like he is getting enough oxygen. Writer Repositions patient, secure Non Rebreather mask and offers pain medication. Will continue to monitor often.

## 2022-01-30 NOTE — DISCHARGE INSTR - COC
Continuity of Care Form    Patient Name: Yolis Ridley   :    MRN:  [id number]    Admit date:  2022  Discharge date:  ***    Code Status Order: DNR-CC   Advance Directives:      Admitting Physician:  Efrem Schafer DO  PCP: Hang Avery MD    Discharging Nurse: [facility] Unit/Room#:   Discharging Unit Phone Number: ***    Emergency Contact:   Extended Emergency Contact Information  Primary Emergency Contact: Eveline Johny  Address: [address] [place] [address].           [address]  Home Phone: [phone]  Work Phone: [phone]  Relation: Spouse  Secondary Emergency Contact: [facility] [address] Phone: [phone]  Relation: Brother/Sister    Past Surgical History:  Past Surgical History:   Procedure Laterality Date    ABLATION OF DYSRHYTHMIC FOCUS  2014    BACK SURGERY      BREAST SURGERY      CARDIAC CATHETERIZATION      CARDIAC PACEMAKER PLACEMENT      CHOLECYSTECTOMY      HAND SURGERY Right     thumb    TONSILLECTOMY      TRANSESOPHAGEAL ECHOCARDIOGRAM  2014       Immunization History: There is no immunization history on file for this patient.     Active Problems:  Patient Active Problem List   Diagnosis Code    AMS (altered mental status) R41.82    COPD without exacerbation ([facility]) J44.9    A-fib ([facility]) I48.91    Gastroesophageal reflux disease without esophagitis K21.9    Neuropathy G62.9    JO (acute kidney injury) ([facility] N17.9    Elevated troponin R77.8    Pneumonia due to COVID-19 virus U07.1, J12.82    Encephalopathy due to COVID-19 virus U07.1, G93.49    Acute respiratory failure with hypercapnia ([facility]) J96.02    Severe malnutrition ([facility]) E43    Acute hypoxemic respiratory failure due to COVID-19 ([facility]) U07.1, J96.01       Isolation/Infection:   Isolation            No Isolation          Patient Infection Status       Infection Onset Added Last Indicated Last Indicated By Review Planned Expiration Resolved Resolved By COVID-19 01/08/22 01/08/22 01/08/22 COVID-19, Rapid 01/28/22 02/05/22      S/s 1/8    Resolved    C-diff Rule Out 01/12/22 01/13/22 01/13/22 C DIFF TOXIN/ANTIGEN (Ordered)   01/14/22 Rule-Out Test Resulted    C-diff Rule Out 01/11/22 01/11/22 01/12/22 Gastrointestinal Panel, Molecular (Ordered)   01/12/22 Flo Rg RN    COVID-19 (Rule Out) 01/08/22 01/08/22 01/08/22 COVID-19, Rapid (Ordered)   01/08/22 Rule-Out Test Resulted            Nurse Assessment:  Last Vital Signs: BP (!) 107/53   Pulse 100   Temp 98.8 °F (37.1 °C) (Axillary)   Resp 21   Wt 248 lb 3.2 oz (112.6 kg)   SpO2 (!) 68%   BMI 34.62 kg/m²     Last documented pain score (0-10 scale): Pain Level: 7  Last Weight:   Wt Readings from Last 1 Encounters:   01/29/22 248 lb 3.2 oz (112.6 kg)     Mental Status:  oriented, coherent, thought processes intact, and able to concentrate and follow conversation    IV Access:  [address] [place] [address] IV ACCESS:[id number]}    Nursing Mobility/ADLs:  Walking   Dependent  Transfer  Dependent  Bathing  Dependent  Dressing  Dependent  Toileting  Dependent  Feeding  Dependent  Med Admin  Dependent  Med Delivery   crushed, prefers mixed with Applesauce , and pudding    Wound Care Documentation and Therapy:        Elimination:  Continence: Bowel: No  Bladder: No  Urinary Catheter: None   Colostomy/Ileostomy/Ileal Conduit: No       Date of Last BM: ***  No intake or output data in the 24 hours ending 01/30/22 0545  No intake/output data recorded.     Safety Concerns:     History of Falls (last 30 days), At Risk for Falls, and Aspiration Risk    Impairments/Disabilities:      ***    Nutrition Therapy:  Current Nutrition Therapy:   - Oral Diet:  Dysphagia 2 mechanically altered and Nectar thickened liquids    Routes of Feeding: Oral  Liquids: Nectar Thick Liquids  Daily Fluid Restriction: no  Last Modified Barium Swallow with Video (Video Swallowing Test): not done    Treatments at the Time of Hospital Discharge:   Respiratory Treatments: ***  Oxygen Therapy:  {Therapy; copd oxygen:65917}  Ventilator:    - No ventilator support    Rehab Therapies: Physical Therapy and Occupational Therapy  Weight Bearing Status/Restrictions: No weight bearing restirctions  Other Medical Equipment (for information only, NOT a DME order):  hospital bed  Other Treatments: ***    Patient's personal belongings (please select all that are sent with patient):  None    RN SIGNATURE:  {Esignature:[id number]}    CASE MANAGEMENT/SOCIAL WORK SECTION    Inpatient Status Date: ***    Readmission Risk Assessment Score:  Readmission Risk              Risk of Unplanned Readmission:  15           Discharging to Facility/ Agency   Name:   Address:  Phone:  Fax:    Dialysis Facility (if applicable)   Name:  Address:  Dialysis Schedule:  Phone:  Fax:    / signature: {Esignature:[id number]}    PHYSICIAN SECTION    Prognosis: {Prognosis:3180844589}    Condition at Discharge: Nila Chris Colton Patient Condition:193937388}    Rehab Potential (if transferring to Rehab): {Prognosis:6029514390}    Recommended Labs or Other Treatments After Discharge: ***    Physician Certification: I certify the above information and transfer of Traci Marin  is necessary for the continuing treatment of the diagnosis listed and that he requires {Admit to Appropriate Level of Care:37165} for {GREATER/LESS:139907026} 30 days.      Update Admission H&P: {CHP DME Changes in [facility]:801057043}    PHYSICIAN SIGNATURE:  {Esignature:[id number]}

## 2022-01-31 NOTE — DISCHARGE SUMMARY
[facility]  Office: [address], DO, Mechelle Francis, DO, Lorena Martins, DO, Karina Chávezion Blood, DO, Aranza Maxwell MD, Kaila Boyd MD, Vitaliy Rojas MD, Devaughn Oshea MD, Dave Call MD, Guido Malik MD, Evi Maza MD, Sisi Guillen, DO, Maite Deluca, DO, Burke Garay MD,  Zbigniew Katz, DO, Jv Randolph MD, Amy Lopez MD, Jazmyn Brewster MD, Kyra Mcfarlane MD, Paige Pepe MD, Angelia Mcfarlane, [facility], [facility] Lenny, CNP, Rodrigo Shrestha, CNP, Miate Mallory, CNS, Edmundo Arreguin, CNP, Margy Don, CNP, Matthew Schafer, CNP, Romero Hernandez, CNP, Cruz Urbina, CNP, Nabil Barahona PA-C, Robyn Torres, [facility], Tate Cleary, [facility], Sabas Ward, CNP, Young Montaño, CNP, Jeremie Smyth, CNP, Ciara Acevedo, CNP, Arianna Romero, [facility], [facility]    Discharge Summary     Patient ID: Ariella Dick  :     MRN: [id number]     ACCOUNT:  [de-identified]   Patient's PCP: Liv Chapin MD  Admit Date: 2022   Discharge Date: 22    Length of Stay: 2  Code Status:  Prior  Admitting Physician: Eveline Kocher Blood, DO  Discharge Physician: Eveline Kocher Blood, DO     Active Discharge Diagnoses:     Hospital Problem Lists:  Principal Problem:    [facility]  Active Problems:    COPD without exacerbation ([facility]    [facility]    Pneumonia due to COVID-19 virus    Severe malnutrition ([facility]  Resolved Problems:    * No resolved hospital problems. *      Admission Condition:  terminal     Discharged Condition:     Hospital Stay:     Hospital Course:  Ariella Dick is a 59 y.o. male who was admitted for the management of  Acute hypoxemic respiratory failure [facility] , presented to ER with No chief complaint on file. Admitted with ams and covid pneumonia with hypoxic resp failure.   Placed on bipap (did not tolerate well) and high flow nc for many weeks at very high FiO2. He did not make any progress weaning the FiO2 and wife elected for hospice care. He is being converted to an inpatient hospice bed    He passed away under hospice care on 22 at 740pm      Significant therapeutic interventions: comfort measures    Significant Diagnostic Studies:   Labs / Micro:  CBC:   Lab Results   Component Value Date    WBC 12.0 2022    RBC 4.75 2022    RBC 4.23 2012    HGB 12.7 2022    HCT 41.7 2022    MCV 87.8 2022    MCH 26.7 2022    MCHC 30.5 2022    RDW 14.1 2022     2022     2012     CMP:    Lab Results   Component Value Date    GLUCOSE 112 2022    GLUCOSE 88 2012     2022    K 3.0 2022    CL 94 2022    CO2 37 2022    BUN 38 2022    CREATININE 1.12 2022    ANIONGAP 10 2022    ALKPHOS 71 2022     2022     2022    BILITOT 1.07 2022    LABALBU 3.5 2022    LABALBU 4.0 2012    ALBUMIN NOT REPORTED 2022    LABGLOM >60 2022    GFRAA >60 2022    GFR      2022    GFR NOT REPORTED 2022    PROT 6.5 2022    CALCIUM 9.0 2022        Radiology:  XR CHEST PORTABLE    Result Date: 2022  1. Persistent bilateral lung infiltrates, unchanged, compatible with multifocal pneumonia. There are probable small bilateral pleural effusions. Consultations:    Consults:     Final Specialist Recommendations/Findings:   IP CONSULT TO HOSPICE  IP CONSULT TO HOSPITALIST      The patient was seen and examined on day of discharge and this discharge summary is in conjunction with any daily progress note from day of discharge.     Discharge plan:     Disposition:     Physician Follow Up:   n/a       Requiring Further Evaluation/Follow Up POST HOSPITALIZATION/Incidental Findings: n/a    Diet: n/a    Activity: n/a    Instructions to Patient: n/a    Discharge Medications:   n/a    No discharge procedures on file. Time Spent on discharge is  20 mins in patient examination, evaluation, counseling as well as medication reconciliation, prescriptions for required medications, discharge plan and follow up. Electronically signed by   Elva Schafer DO  1/31/2022  5:48 PM      Thank you Dr. Trey Kaplan MD for the opportunity to be involved in this patient's care.

## 2022-01-31 NOTE — SIGNIFICANT EVENT
I evaluated this patient after respiratory arrest in the emergency department. Patient is DNR CC and stop breathing per report of nursing staff. No resuscitation was administered due to [address]. I examined the patient at 80. No identifiable pulse. Time of death 80.

## 2022-01-31 NOTE — FLOWSHEET NOTE
[facility]   Patient Death Note  DEATH                  Room #    Name: Mario Rosado            Age: 59 y.o. Gender: male          Confucianism: [address] of Mormonism: Unknown  Admit Date & Time: 2022  4:22 PM        Actual date of death: 2022 7:40 PM       Referral:  Unit: Med Surg  Nurse: Mayela Yeung    SITUATION AT DEATH:  Patient is a hospice patient and had been under the care of [address] since 2022. IS THIS A 'S CASE? No    SPIRITUAL/EMOTIONAL INTERVENTION:  Writer receives Perfect Serve at 7:40 PM from [employer]. Patient had just . Writer arrives and provides emotional support and prayer for spouse's wife, Soo Granger. Soo Granger seems to be coping adequately and has a friend and a daughter present to provide support also. Writer assists with completion of the Expiration Summary/Release of Body form. Patient is under the care of [address] and the hospice RN states that she had spoken to the 's office and that the  did not need to be notified. Writer delivers the Expiration summary/Release of Body form to the ED registration desk and contacts the  home. DOCTOR SIGNING DEATH CERTIFICATE:  Name: Dr. Jim Lowery MD  Phone number: [phone]    Copy of COMPLETED Release of Body Form Received? Yes    Patient's belongings: With Family     HOME:  Contacted Time: 9:30 PM  Name: Jazmine Cruz: [facility]  Phone Number: [phone]    NEXT OF KIN:  Name: Manuela Dominguez  Relationship: Spouse  Street Address: [address] [place] [address] Avenue: [address] [place] [address] Avenue: [place]  Zip code: [address]  Phone Number: [phone]    [facility]? No    IF SO, WHAT?   None    Electronically signed by Andrew Dumnot on 2022 at 8:49 PM.  Marcus  [phone]
